# Patient Record
Sex: FEMALE | Race: WHITE | Employment: FULL TIME | URBAN - METROPOLITAN AREA
[De-identification: names, ages, dates, MRNs, and addresses within clinical notes are randomized per-mention and may not be internally consistent; named-entity substitution may affect disease eponyms.]

---

## 2016-12-08 LAB
ANTIBODY SCREEN, EXTERNAL: NORMAL
CHLAMYDIA, EXTERNAL: NORMAL
CYSTIC FIBROSIS, EXTERNAL: NORMAL
HBSAG, EXTERNAL: NORMAL
HCT, EXTERNAL: 38.8
HGB, EXTERNAL: 13.2
HIV, EXTERNAL: NORMAL
N. GONORRHEA, EXTERNAL: NORMAL
PLATELET CNT,   EXTERNAL: 257
RUBELLA, EXTERNAL: NORMAL
T. PALLIDUM, EXTERNAL: NORMAL
TYPE, ABO & RH, EXTERNAL: NORMAL
URINALYSIS, EXTERNAL: NORMAL

## 2017-01-30 LAB — AFP, MATERNAL, EXTERNAL: NORMAL

## 2017-03-02 LAB
NT, EXTERNAL: NORMAL
PAP SMEAR, EXTERNAL: NORMAL

## 2017-03-26 PROBLEM — O09.512 SUPERVISION OF HIGH RISK ELDERLY PRIMIGRAVIDA IN SECOND TRIMESTER: Status: ACTIVE | Noted: 2017-03-26

## 2017-03-30 ENCOUNTER — ROUTINE PRENATAL (OUTPATIENT)
Dept: OBGYN CLINIC | Age: 42
End: 2017-03-30

## 2017-03-30 VITALS — DIASTOLIC BLOOD PRESSURE: 80 MMHG | SYSTOLIC BLOOD PRESSURE: 122 MMHG | WEIGHT: 206 LBS

## 2017-03-30 DIAGNOSIS — Z3A.24 24 WEEKS GESTATION OF PREGNANCY: Primary | ICD-10-CM

## 2017-03-30 RX ORDER — GUAIFENESIN 100 MG/5ML
81 LIQUID (ML) ORAL DAILY
COMMUNITY
End: 2017-07-06

## 2017-03-30 RX ORDER — FOLIC ACID 1 MG/1
TABLET ORAL DAILY
COMMUNITY
End: 2017-07-06

## 2017-04-05 ENCOUNTER — TELEPHONE (OUTPATIENT)
Dept: OBGYN CLINIC | Age: 42
End: 2017-04-05

## 2017-04-05 NOTE — TELEPHONE ENCOUNTER
25w2d called wanting to know if the MD would have the paper work that she left filled out by tomorrow. Please advise.

## 2017-04-18 ENCOUNTER — ROUTINE PRENATAL (OUTPATIENT)
Dept: OBGYN CLINIC | Age: 42
End: 2017-04-18

## 2017-04-18 VITALS — SYSTOLIC BLOOD PRESSURE: 116 MMHG | WEIGHT: 211 LBS | DIASTOLIC BLOOD PRESSURE: 78 MMHG

## 2017-04-18 DIAGNOSIS — Z23 ENCOUNTER FOR IMMUNIZATION: ICD-10-CM

## 2017-04-18 DIAGNOSIS — O30.049 DICHORIONIC DIAMNIOTIC TWIN PREGNANCY, ANTEPARTUM: Primary | ICD-10-CM

## 2017-04-18 NOTE — PATIENT INSTRUCTIONS
Weeks 26 to 30 of Your Pregnancy: Care Instructions  Your Care Instructions    You are now in your last trimester of pregnancy. Your baby is growing rapidly. And you'll probably feel your baby moving around more often. Your doctor may ask you to count your baby's kicks. Your back may ache as your body gets used to your baby's size and length. If you haven't already had the Tdap shot during this pregnancy, talk to your doctor about getting it. It will help protect your  against pertussis infection. During this time, it's important to take care of yourself and pay attention to what your body needs. If you feel sexual, explore ways to be close with your partner that match your comfort and desire. Use the tips provided in this care sheet to find ways to be sexual in your own way. Follow-up care is a key part of your treatment and safety. Be sure to make and go to all appointments, and call your doctor if you are having problems. It's also a good idea to know your test results and keep a list of the medicines you take. How can you care for yourself at home? Take it easy at work  · Take frequent breaks. If possible, stop working when you are tired, and rest during your lunch hour. · Take bathroom breaks every 2 hours. · Change positions often. If you sit for long periods, stand up and walk around. · When you stand for a long time, keep one foot on a low stool with your knee bent. After standing a lot, sit with your feet up. · Avoid fumes, chemicals, and tobacco smoke. Be sexual in your own way  · Having sex during pregnancy is okay, unless your doctor tells you not to. · You may be very interested in sex, or you may have no interest at all. · Your growing belly can make it hard to find a good position during intercourse. [place] and explore. · You may get cramps in your uterus when your partner touches your breasts.   · A back rub may relieve the backache or cramps that sometimes follow orgasm. Learn about  labor  · Watch for signs of  labor. You may be going into labor if:  ¨ You have menstrual-like cramps, with or without nausea. ¨ You have about 4 or more contractions in 20 minutes, or about 8 or more within 1 hour, even after you have had a glass of water and are resting. ¨ You have a low, dull backache that does not go away when you change your position. ¨ You have pain or pressure in your pelvis that comes and goes in a pattern. ¨ You have intestinal cramping or flu-like symptoms, with or without diarrhea. ¨ You notice an increase or change in your vaginal discharge. Discharge may be heavy, mucus-like, watery, or streaked with blood. ¨ Your water breaks. · If you think you have  labor:  ¨ Drink 2 or 3 glasses of water or juice. Not drinking enough fluids can cause contractions. ¨ Stop what you are doing, and empty your bladder. Then lie down on your left side for at least 1 hour. ¨ While lying on your side, find your breast bone. Put your fingers in the soft spot just below it. Move your fingers down toward your belly button to find the top of your uterus. Check to see if it is tight. ¨ Contractions can be weak or strong. Record your contractions for an hour. Time a contraction from the start of one contraction to the start of the next one. ¨ Single or several strong contractions without a pattern are called Patrick-Abrams contractions. They are practice contractions but not the start of labor. They often stop if you change what you are doing. ¨ Call your doctor if you have regular contractions. Where can you learn more? Go to http://laurence-timothy.info/. Enter X945 in the search box to learn more about \"Weeks 26 to 30 of Your Pregnancy: Care Instructions. \"  Current as of: May 30, 2016  Content Version: 11.2  © 1293-2624 [employer].  Care instructions adapted under license by [employer] (which disclaims liability or warranty for this information). If you have questions about a medical condition or this instruction, always ask your healthcare professional. [place] [address] any warranty or liability for your use of this information.

## 2017-04-18 NOTE — PROGRESS NOTES
47 yo G1 at 27w1d with di-di twins (boys) presenting for Sidra Segura 9038 visit. Doing well overall. +FM, no LOF, no VB, no ctx. +GERD, relief with tums. +insomnia. +SI joint pain --> referral to [facility] ([place], [place]) for PT.     Tdap today    28 wk labs next visit    Jesus Varela MD  4/18/2017  8:32 AM

## 2017-04-18 NOTE — MR AVS SNAPSHOT
Visit Information Date & Time Provider Department Dept. Phone Encounter #  
 4/18/2017  8:00 AM Braeden Garcia [phone] [id number] Your Appointments 5/2/2017  3:00 PM  
ESTABLISHED PATIENT with MD Braeden Green  ([facility]) Appt Note: duplicate-fob w/ glucola; pt needed diff date [address] [facility] [address]  
[address]  
  
    
 5/2/2017  3:00 PM  
OB VISIT with MD Braeden Glez  ([facility]) Appt Note: pt needed diff date [address] [place] [facility] [address]  
[address] [place] [address] [address] Upcoming Health Maintenance Date Due  
 PAP AKA CERVICAL CYTOLOGY 5/29/1998 INFLUENZA AGE 9 TO ADULT 8/1/2016 Allergies as of 4/18/2017  Review Complete On: 4/18/2017 By: Elbert Tristan RN Severity Noted Reaction Type Reactions Amoxicillin High 03/26/2017    Anaphylaxis Anesthetic [Benzocaine-aloe Vera] High 03/26/2017    Anaphylaxis ANESTHESIA Current Immunizations  Never Reviewed No immunizations on file. Not reviewed this visit Vitals BP Weight(growth percentile) OB Status Smoking Status 116/78 211 lb (95.7 kg) Pregnant Never Smoker Your Updated Medication List  
  
   
This list is accurate as of: 4/18/17  8:16 AM.  Always use your most recent med list.  
  
  
  
  
 aspirin 81 mg chewable tablet Take 81 mg by mouth daily. folic acid 1 mg tablet Commonly known as:  Google Take  by mouth daily. prenatal multivit-ca-min-fe-fa Tab Take  by mouth. Patient Instructions Weeks 26 to 30 of Your Pregnancy: Care Instructions Your Care Instructions You are now in your last trimester of pregnancy.  Your baby is growing rapidly. And you'll probably feel your baby moving around more often. Your doctor may ask you to count your baby's kicks. Your back may ache as your body gets used to your baby's size and length. If you haven't already had the Tdap shot during this pregnancy, talk to your doctor about getting it. It will help protect your  against pertussis infection. During this time, it's important to take care of yourself and pay attention to what your body needs. If you feel sexual, explore ways to be close with your partner that match your comfort and desire. Use the tips provided in this care sheet to find ways to be sexual in your own way. Follow-up care is a key part of your treatment and safety. Be sure to make and go to all appointments, and call your doctor if you are having problems. It's also a good idea to know your test results and keep a list of the medicines you take. How can you care for yourself at home? Take it easy at work · Take frequent breaks. If possible, stop working when you are tired, and rest during your lunch hour. · Take bathroom breaks every 2 hours. · Change positions often. If you sit for long periods, stand up and walk around. · When you stand for a long time, keep one foot on a low stool with your knee bent. After standing a lot, sit with your feet up. · Avoid fumes, chemicals, and tobacco smoke. Be sexual in your own way · Having sex during pregnancy is okay, unless your doctor tells you not to. · You may be very interested in sex, or you may have no interest at all. · Your growing belly can make it hard to find a good position during intercourse. [place] and explore. · You may get cramps in your uterus when your partner touches your breasts. · A back rub may relieve the backache or cramps that sometimes follow orgasm. Learn about  labor · Watch for signs of  labor. You may be going into labor if: ¨ You have menstrual-like cramps, with or without nausea. ¨ You have about 4 or more contractions in 20 minutes, or about 8 or more within 1 hour, even after you have had a glass of water and are resting. ¨ You have a low, dull backache that does not go away when you change your position. ¨ You have pain or pressure in your pelvis that comes and goes in a pattern. ¨ You have intestinal cramping or flu-like symptoms, with or without diarrhea. ¨ You notice an increase or change in your vaginal discharge. Discharge may be heavy, mucus-like, watery, or streaked with blood. ¨ Your water breaks. · If you think you have  labor: ¨ Drink 2 or 3 glasses of water or juice. Not drinking enough fluids can cause contractions. ¨ Stop what you are doing, and empty your bladder. Then lie down on your left side for at least 1 hour. ¨ While lying on your side, find your breast bone. Put your fingers in the soft spot just below it. Move your fingers down toward your belly button to find the top of your uterus. Check to see if it is tight. ¨ Contractions can be weak or strong. Record your contractions for an hour. Time a contraction from the start of one contraction to the start of the next one. ¨ Single or several strong contractions without a pattern are called Oceana-Abrams contractions. They are practice contractions but not the start of labor. They often stop if you change what you are doing. ¨ Call your doctor if you have regular contractions. Where can you learn more? Go to http://laurence-timothy.info/. Enter R929 in the search box to learn more about \"Weeks 26 to 30 of Your Pregnancy: Care Instructions. \" Current as of: May 30, 2016 Content Version: 11.2 © 4380-9154 [employer]. Care instructions adapted under license by [employer] (which disclaims liability or warranty for this information).  If you have questions about a medical condition or this instruction, always ask your healthcare professional. [place] [address] any warranty or liability for your use of this information. Introducing [facility] & HEALTH SERVICES! [employer] introduces [employer] patient portal. Now you can access parts of your medical record, email your doctor's office, and request medication refills online. 1. In your internet browser, go to [email or web address] 2. Click on the First Time User? Click Here link in the Sign In box. You will see the New Member Sign Up page. 3. Enter your [employer] Access Code exactly as it appears below. You will not need to use this code after youve completed the sign-up process. If you do not sign up before the expiration date, you must request a new code. · [employer] Access Code: [email or web address] Expires: 7/17/2017  8:16 AM 
 
4. Enter the last four digits of your Social Security Number (xxxx) and Date of Birth (mm/dd/yyyy) as indicated and click Submit. You will be taken to the next sign-up page. 5. Create a [employer] ID. This will be your [employer] login ID and cannot be changed, so think of one that is secure and easy to remember. 6. Create a [employer] password. You can change your password at any time. 7. Enter your Password Reset Question and Answer. This can be used at a later time if you forget your password. 8. Enter your e-mail address. You will receive e-mail notification when new information is available in [address] 9. Click Sign Up. You can now view and download portions of your medical record. 10. Click the Download Summary menu link to download a portable copy of your medical information. If you have questions, please visit the Frequently Asked Questions section of the [employer] website. Remember, [employer] is NOT to be used for urgent needs. For medical emergencies, dial 911. Now available from your iPhone and Android! Please provide this summary of care documentation to your next provider. If you have any questions after today's visit, please call [phone].

## 2017-05-02 ENCOUNTER — ROUTINE PRENATAL (OUTPATIENT)
Dept: OBGYN CLINIC | Age: 42
End: 2017-05-02

## 2017-05-02 VITALS — WEIGHT: 213 LBS | SYSTOLIC BLOOD PRESSURE: 122 MMHG | DIASTOLIC BLOOD PRESSURE: 76 MMHG

## 2017-05-02 DIAGNOSIS — O30.043 DICHORIONIC DIAMNIOTIC TWIN PREGNANCY IN THIRD TRIMESTER: Primary | ICD-10-CM

## 2017-05-02 LAB — GTT, 1 HR, GLUCOLA, EXTERNAL: 108

## 2017-05-02 NOTE — PROGRESS NOTES
Glucola today.    Constipation; discussed management; US last Monday- twins doing great; thick cx; next US 5/22; fu here 2wk

## 2017-05-02 NOTE — MR AVS SNAPSHOT
Visit Information Date & Time Provider Department Dept. Phone Encounter #  
 8/2/4070  6:55 PM Jerrica Sheehan  [facility] [address] [facility] [id number] Your Appointments 5/15/2017  8:40 AM  
OB VISIT with Jessica Clemens MD  
[address] ([facility]) Appt Note: duplicate-fob  
 [address] [address]  
[address] 5/15/2017  8:40 AM  
OB VISIT with Jerrica Sheehan MD  
[address] ([facility]) Appt Note: duplicate-fob  
 [address] [address]  
[address] [place] [address]  
  
    
 5/30/2017  7:50 AM  
OB VISIT with Jerrica Sheehan MD  
[address] ([facility]) Appt Note: fob  
 [address]  
[phone] 6/12/2017  7:50 AM  
OB VISIT with Jerrica Sheehan MD  
[address] ([facility]) Appt Note: fob  
 [address]  
[phone] 6/19/2017  8:10 AM  
OB VISIT with Jerrica Sheehan MD  
[address] ([facility]) Appt Note: fob  
 [address]  
[phone] 6/26/2017  7:40 AM  
OB VISIT with Jerrica Sheehan MD  
[address] ([facility]) Appt Note: fob  
 [address]  
[phone] 7/3/2017  7:50 AM  
OB VISIT with Jerrica Sheehan MD  
[address] ([facility]) Appt Note: fob  
 [address]  
[phone] 7/10/2017  8:20 AM  
OB VISIT with Jessica Clemens MD  
[address] ([facility]) Appt Note: alfred/dulce pt  
 [address] [facility] [address] [address] [phone] Upcoming Health Maintenance Date Due  
 PAP AKA CERVICAL CYTOLOGY 5/29/1998 INFLUENZA AGE 9 TO ADULT 8/1/2017 Allergies as of 5/2/2017  Review Complete On: 5/2/2017 By: Gloria Suh Severity Noted Reaction Type Reactions Amoxicillin High 03/26/2017    Anaphylaxis Anesthetic [Benzocaine-aloe Vera] High 03/26/2017    Anaphylaxis ANESTHESIA Current Immunizations  Never Reviewed Name Date Tdap 4/18/2017 Not reviewed this visit You Were Diagnosed With   
  
 Codes Comments Dichorionic diamniotic twin pregnancy in third trimester    -  Primary ICD-10-CM: O30.043 ICD-9-CM: 651.03, V91.03 Vitals BP Weight(growth percentile) OB Status Smoking Status 122/76 213 lb (96.6 kg) Pregnant Never Smoker Your Updated Medication List  
  
   
This list is accurate as of: 5/2/17  1:57 PM.  Always use your most recent med list.  
  
  
  
  
 aspirin 81 mg chewable tablet Take 81 mg by mouth daily. folic acid 1 mg tablet Commonly known as:  Google Take  by mouth daily. prenatal multivit-ca-min-fe-fa Tab Take  by mouth. Patient Instructions Learning About Twin Pregnancy What is different about a twin pregnancy? In many ways, a twin pregnancy is like a single-baby pregnancy. Healthy twins develop like a single baby does until the last trimester, when their growth slows down. Twins are usually born before the usual 40-week due date. For the mother, carrying twins can be more difficult than carrying a single baby. And her risks are higher for pregnancy problems. That's why keeping up with prenatal checks and tests is especially important. How do twins grow? Twins develop from embryos to babies like a single baby does. · By weeks 10 to 14 of your pregnancy, one or two placentas have formed inside your uterus.  It is possible to hear your babies' heartbeats with a special ultrasound device. Your babies' eyes can move. Their arms and legs can bend. · Around weeks 14 to 18, hair is beginning to grow on your babies' heads. · By 18 to 22 weeks, your babies can now suck their thumbs and  firmly with their hands. They can open and close their eyelids. Around this time, you may start to feel your babies move. At first, this might feel like fluttering or \"butterflies. \" 
· Around week 26, you may notice that your babies respond to the sound of your or your partner's voice. You may also notice that they do less turning and twisting and more squirming. · Up to 32 weeks, twins grow rapidly. By this point, they really start to look like babies, with hair and plump skin. · Around 32 to 34 weeks, twins' pace of growth slows down. Their lungs become more ready for breathing. This marks a stage when babies born early are less likely to have breathing problems after birth. What can you expect during a twin pregnancy? With a twin pregnancy, your body makes high levels of pregnancy hormones. So morning sickness may come on earlier and stronger than if you were carrying a single baby. You may also have earlier and more intense symptoms from pregnancy, like swelling, heartburn, leg cramps, bladder discomfort, and sleep problems. Your belly may grow bigger, and you may gain more weight, sooner. Talk to your doctor about how much you might expect to gain. When you're carrying twins, you and your babies may be tested and checked more than you would for a single-baby pregnancy. · At about 10 weeks, an ultrasound can show if the babies are growing in the same amniotic sac. If they each have their own sac and their own placenta, twins have the best chances of both growing well. · Between weeks 18 and 20, an ultrasound may be able to show the sex of your babies. · Later in your pregnancy, you will start to have checkups more often. This will be sooner than for a single-baby pregnancy. Twins tend to be born early, but 37 weeks is a goal when mother and babies are doing well. As you get closer to delivery time, your medical team will help you know what to expect and what to do. As questions come to mind, keep a list so you can remember to ask your doctor. Follow-up care is a key part of your treatment and safety. Be sure to make and go to all appointments, and call your doctor if you are having problems. It's also a good idea to know your test results and keep a list of the medicines you take. Where can you learn more? Go to http://laurence-timothy.info/. Enter E672 in the search box to learn more about \"Learning About Twin Pregnancy. \" Current as of: May 30, 2016 Content Version: 11.2 © 4272-8753 [employer]. Care instructions adapted under license by [employer] (which disclaims liability or warranty for this information). If you have questions about a medical condition or this instruction, always ask your healthcare professional. [place] [address] any warranty or liability for your use of this information. Introducing [facility] & HEALTH SERVICES! [facility] introduces L[employer] patient portal. Now you can access parts of your medical record, email your doctor's office, and request medication refills online. 1. In your internet browser, go to [email or web address] 2. Click on the First Time User? Click Here link in the Sign In box. You will see the New Member Sign Up page. 3. Enter your Litesprite Access Code exactly as it appears below. You will not need to use this code after youve completed the sign-up process. If you do not sign up before the expiration date, you must request a new code. · Lites[employer]ite Access Code: [email or web address] Expires: 7/17/2017  8:16 AM 
 
4. Enter the last four digits of your Social Security Number (xxxx) and Date of Birth (mm/dd/yyyy) as indicated and click Submit.  You will be taken to the next sign-up page. 5. Create a [employer] ID. This will be your [employer] login ID and cannot be changed, so think of one that is secure and easy to remember. 6. Create a [employer] password. You can change your password at any time. 7. Enter your Password Reset Question and Answer. This can be used at a later time if you forget your password. 8. Enter your e-mail address. You will receive e-mail notification when new information is available in [address] 9. Click Sign Up. You can now view and download portions of your medical record. 10. Click the Download Summary menu link to download a portable copy of your medical information. If you have questions, please visit the Frequently Asked Questions section of the [employer] website. Remember, [employer] is NOT to be used for urgent needs. For medical emergencies, dial 911. Now available from your iPhone and Android! Please provide this summary of care documentation to your next provider. If you have any questions after today's visit, please call [phone].

## 2017-05-02 NOTE — PATIENT INSTRUCTIONS
Learning About Twin Pregnancy  What is different about a twin pregnancy? In many ways, a twin pregnancy is like a single-baby pregnancy. Healthy twins develop like a single baby does until the last trimester, when their growth slows down. Twins are usually born before the usual 40-week due date. For the mother, carrying twins can be more difficult than carrying a single baby. And her risks are higher for pregnancy problems. That's why keeping up with prenatal checks and tests is especially important. How do twins grow? Twins develop from embryos to babies like a single baby does. · By weeks 10 to 14 of your pregnancy, one or two placentas have formed inside your uterus. It is possible to hear your babies' heartbeats with a special ultrasound device. Your babies' eyes can move. Their arms and legs can bend. · Around weeks 14 to 18, hair is beginning to grow on your babies' heads. · By 18 to 22 weeks, your babies can now suck their thumbs and  firmly with their hands. They can open and close their eyelids. Around this time, you may start to feel your babies move. At first, this might feel like fluttering or \"butterflies. \"  · Around week 26, you may notice that your babies respond to the sound of your or your partner's voice. You may also notice that they do less turning and twisting and more squirming. · Up to 32 weeks, twins grow rapidly. By this point, they really start to look like babies, with hair and plump skin. · Around 32 to 34 weeks, twins' pace of growth slows down. Their lungs become more ready for breathing. This marks a stage when babies born early are less likely to have breathing problems after birth. What can you expect during a twin pregnancy? With a twin pregnancy, your body makes high levels of pregnancy hormones. So morning sickness may come on earlier and stronger than if you were carrying a single baby.  You may also have earlier and more intense symptoms from pregnancy, like swelling, heartburn, leg cramps, bladder discomfort, and sleep problems. Your belly may grow bigger, and you may gain more weight, sooner. Talk to your doctor about how much you might expect to gain. When you're carrying twins, you and your babies may be tested and checked more than you would for a single-baby pregnancy. · At about 10 weeks, an ultrasound can show if the babies are growing in the same amniotic sac. If they each have their own sac and their own placenta, twins have the best chances of both growing well. · Between weeks 18 and 20, an ultrasound may be able to show the sex of your babies. · Later in your pregnancy, you will start to have checkups more often. This will be sooner than for a single-baby pregnancy. Twins tend to be born early, but 37 weeks is a goal when mother and babies are doing well. As you get closer to delivery time, your medical team will help you know what to expect and what to do. As questions come to mind, keep a list so you can remember to ask your doctor. Follow-up care is a key part of your treatment and safety. Be sure to make and go to all appointments, and call your doctor if you are having problems. It's also a good idea to know your test results and keep a list of the medicines you take. Where can you learn more? Go to http://laurence-timothy.info/. Enter X093 in the search box to learn more about \"Learning About Twin Pregnancy. \"  Current as of: May 30, 2016  Content Version: 11.2  © 1318-2094 [employer]. Care instructions adapted under license by [employer] (which disclaims liability or warranty for this information). If you have questions about a medical condition or this instruction, always ask your healthcare professional. [address] any warranty or liability for your use of this information.

## 2017-05-03 LAB
ERYTHROCYTE [DISTWIDTH] IN BLOOD BY AUTOMATED COUNT: 12.6 % (ref 12.3–15.4)
GLUCOSE 1H P 50 G GLC PO SERPL-MCNC: 108 MG/DL (ref 65–139)
HCT VFR BLD AUTO: 33.8 % (ref 34–46.6)
HGB BLD-MCNC: 10.9 G/DL (ref 11.1–15.9)
MCH RBC QN AUTO: 29.6 PG (ref 26.6–33)
MCHC RBC AUTO-ENTMCNC: 32.2 G/DL (ref 31.5–35.7)
MCV RBC AUTO: 92 FL (ref 79–97)
PLATELET # BLD AUTO: 271 X10E3/UL (ref 150–379)
RBC # BLD AUTO: 3.68 X10E6/UL (ref 3.77–5.28)
WBC # BLD AUTO: 12 X10E3/UL (ref 3.4–10.8)

## 2017-05-10 ENCOUNTER — TELEPHONE (OUTPATIENT)
Dept: OBGYN CLINIC | Age: 42
End: 2017-05-10

## 2017-05-10 NOTE — TELEPHONE ENCOUNTER
30w2d called stated that she is pregnant with twins. Stated Baby B is not moving as much as Baby A. Denied any bleeding or cramping. Spoke to Troy and the call was transferred to her.

## 2017-05-11 ENCOUNTER — ROUTINE PRENATAL (OUTPATIENT)
Dept: OBGYN CLINIC | Age: 42
End: 2017-05-11

## 2017-05-11 VITALS — WEIGHT: 215 LBS | DIASTOLIC BLOOD PRESSURE: 66 MMHG | SYSTOLIC BLOOD PRESSURE: 108 MMHG

## 2017-05-11 DIAGNOSIS — O30.049 DICHORIONIC DIAMNIOTIC TWIN PREGNANCY, ANTEPARTUM: Primary | ICD-10-CM

## 2017-05-11 NOTE — PROGRESS NOTES
US- vtx/breech; nl growth/ MILTON/ BPP x2; overall doing great; had been concerned about decreased FM of twin B but reassured leandro as anterior placenta  Has fu w Dr Suki Bobby next week

## 2017-05-18 ENCOUNTER — TELEPHONE (OUTPATIENT)
Dept: OBGYN CLINIC | Age: 42
End: 2017-05-18

## 2017-05-18 NOTE — TELEPHONE ENCOUNTER
Patient returned call and was transferred to [facility] for assistance per her documentation.

## 2017-05-18 NOTE — TELEPHONE ENCOUNTER
Patient called demanding either Leigh Ann Browne or Alma Cavanaugh to write her a RX for PT and leave it at the  and she will pick it up. Please advise.

## 2017-05-18 NOTE — TELEPHONE ENCOUNTER
Left message with patient to let us know what she needs PT for, not sure if it is back pain, sciatica, etc. I can then leave it for her to

## 2017-05-19 NOTE — TELEPHONE ENCOUNTER
Patient called requesting the order to be faxed so she does not have to get out of bed. The fax# [phone] Attn : Chris Rees Please advise.

## 2017-05-19 NOTE — TELEPHONE ENCOUNTER
Jazmyn Bajwa from [facility] calling stating patient is at the PT facility now and does not have an order for PT and cannot have PT until the order is faxed to [phone].

## 2017-05-26 ENCOUNTER — ROUTINE PRENATAL (OUTPATIENT)
Dept: OBGYN CLINIC | Age: 42
End: 2017-05-26

## 2017-05-26 VITALS — WEIGHT: 217 LBS | SYSTOLIC BLOOD PRESSURE: 122 MMHG | DIASTOLIC BLOOD PRESSURE: 78 MMHG

## 2017-05-26 DIAGNOSIS — O30.043 DICHORIONIC DIAMNIOTIC TWIN PREGNANCY IN THIRD TRIMESTER: Primary | ICD-10-CM

## 2017-05-26 NOTE — PATIENT INSTRUCTIONS
Learning About Twin Pregnancy  What is different about a twin pregnancy? In many ways, a twin pregnancy is like a single-baby pregnancy. Healthy twins develop like a single baby does until the last trimester, when their growth slows down. Twins are usually born before the usual 40-week due date. For the mother, carrying twins can be more difficult than carrying a single baby. And her risks are higher for pregnancy problems. That's why keeping up with prenatal checks and tests is especially important. How do twins grow? Twins develop from embryos to babies like a single baby does. · By weeks 10 to 14 of your pregnancy, one or two placentas have formed inside your uterus. It is possible to hear your babies' heartbeats with a special ultrasound device. Your babies' eyes can move. Their arms and legs can bend. · Around weeks 14 to 18, hair is beginning to grow on your babies' heads. · By 18 to 22 weeks, your babies can now suck their thumbs and  firmly with their hands. They can open and close their eyelids. Around this time, you may start to feel your babies move. At first, this might feel like fluttering or \"butterflies. \"  · Around week 26, you may notice that your babies respond to the sound of your or your partner's voice. You may also notice that they do less turning and twisting and more squirming. · Up to 32 weeks, twins grow rapidly. By this point, they really start to look like babies, with hair and plump skin. · Around 32 to 34 weeks, twins' pace of growth slows down. Their lungs become more ready for breathing. This marks a stage when babies born early are less likely to have breathing problems after birth. What can you expect during a twin pregnancy? With a twin pregnancy, your body makes high levels of pregnancy hormones. So morning sickness may come on earlier and stronger than if you were carrying a single baby.  You may also have earlier and more intense symptoms from pregnancy, like swelling, heartburn, leg cramps, bladder discomfort, and sleep problems. Your belly may grow bigger, and you may gain more weight, sooner. Talk to your doctor about how much you might expect to gain. When you're carrying twins, you and your babies may be tested and checked more than you would for a single-baby pregnancy. · At about 10 weeks, an ultrasound can show if the babies are growing in the same amniotic sac. If they each have their own sac and their own placenta, twins have the best chances of both growing well. · Between weeks 18 and 20, an ultrasound may be able to show the sex of your babies. · Later in your pregnancy, you will start to have checkups more often. This will be sooner than for a single-baby pregnancy. Twins tend to be born early, but 37 weeks is a goal when mother and babies are doing well. As you get closer to delivery time, your medical team will help you know what to expect and what to do. As questions come to mind, keep a list so you can remember to ask your doctor. Follow-up care is a key part of your treatment and safety. Be sure to make and go to all appointments, and call your doctor if you are having problems. It's also a good idea to know your test results and keep a list of the medicines you take. Where can you learn more? Go to http://laurence-timothy.info/. Enter X543 in the search box to learn more about \"Learning About Twin Pregnancy. \"  Current as of: May 30, 2016  Content Version: 11.2  © 0492-9705 [employer]. Care instructions adapted under license by [employer] (which disclaims liability or warranty for this information). If you have questions about a medical condition or this instruction, always ask your healthcare professional. [address] any warranty or liability for your use of this information.

## 2017-05-26 NOTE — PROGRESS NOTES
Patient had US with Lula Carranza Monday Baby A 96% 8/8 BPP; vertex  Baby B 95% 8/8 BPP; transverse  Hanging in there; cx firm/closed  Good friends w the [facility]; just delivered twin girls

## 2017-06-02 ENCOUNTER — ROUTINE PRENATAL (OUTPATIENT)
Dept: OBGYN CLINIC | Age: 42
End: 2017-06-02

## 2017-06-02 VITALS — DIASTOLIC BLOOD PRESSURE: 78 MMHG | WEIGHT: 220 LBS | SYSTOLIC BLOOD PRESSURE: 124 MMHG

## 2017-06-02 DIAGNOSIS — O30.003 TWIN GESTATION IN THIRD TRIMESTER, UNSPECIFIED MULTIPLE GESTATION TYPE: Primary | ICD-10-CM

## 2017-06-02 NOTE — PROGRESS NOTES
Doing well.   US- vtx/breech; reassuring surveillance on both; HAPPY TO HAVE PLTCS; precautions discussed; pt still working and swimming

## 2017-06-02 NOTE — PATIENT INSTRUCTIONS

## 2017-06-12 ENCOUNTER — ROUTINE PRENATAL (OUTPATIENT)
Dept: OBGYN CLINIC | Age: 42
End: 2017-06-12

## 2017-06-12 VITALS — SYSTOLIC BLOOD PRESSURE: 120 MMHG | WEIGHT: 221 LBS | DIASTOLIC BLOOD PRESSURE: 70 MMHG

## 2017-06-12 DIAGNOSIS — Z34.03 ENCOUNTER FOR SUPERVISION OF NORMAL FIRST PREGNANCY IN THIRD TRIMESTER: Primary | ICD-10-CM

## 2017-06-12 NOTE — PROGRESS NOTES
Ultrasound with Dr. Perla Pina last Friday reassuring; still working and doesn't want to stop; getting very uncomfortable

## 2017-06-19 ENCOUNTER — ROUTINE PRENATAL (OUTPATIENT)
Dept: OBGYN CLINIC | Age: 42
End: 2017-06-19

## 2017-06-19 VITALS — DIASTOLIC BLOOD PRESSURE: 82 MMHG | SYSTOLIC BLOOD PRESSURE: 120 MMHG | WEIGHT: 221 LBS

## 2017-06-19 DIAGNOSIS — Z34.03 ENCOUNTER FOR SUPERVISION OF NORMAL FIRST PREGNANCY IN THIRD TRIMESTER: Primary | ICD-10-CM

## 2017-06-19 NOTE — PROGRESS NOTES
ultrasound with Dr. Ramesh Castro last Thursday reassuring; vtx/ transverse; Ramesh Castro said she could have C section scheduled 37-38 week; cx closed  Trying to post PLTCS for 7/3 at noon; GBS

## 2017-06-22 LAB — GRBS, EXTERNAL: NEGATIVE

## 2017-06-23 LAB — B-HEM STREP SPEC QL CULT: NEGATIVE

## 2017-06-27 ENCOUNTER — ROUTINE PRENATAL (OUTPATIENT)
Dept: OBGYN CLINIC | Age: 42
End: 2017-06-27

## 2017-06-27 VITALS — DIASTOLIC BLOOD PRESSURE: 86 MMHG | WEIGHT: 224 LBS | SYSTOLIC BLOOD PRESSURE: 128 MMHG

## 2017-06-27 DIAGNOSIS — Z34.03 ENCOUNTER FOR SUPERVISION OF NORMAL FIRST PREGNANCY IN THIRD TRIMESTER: Primary | ICD-10-CM

## 2017-06-30 ENCOUNTER — HOSPITAL ENCOUNTER (OUTPATIENT)
Dept: OTHER | Age: 42
Discharge: HOME | End: 2017-06-30
Payer: COMMERCIAL

## 2017-06-30 VITALS — HEIGHT: 72 IN

## 2017-06-30 DIAGNOSIS — O09.512 SUPERVISION OF HIGH RISK ELDERLY PRIMIGRAVIDA IN SECOND TRIMESTER: ICD-10-CM

## 2017-06-30 LAB
ERYTHROCYTE [DISTWIDTH] IN BLOOD BY AUTOMATED COUNT: 13.4 % (ref 11.5–14.5)
HCT VFR BLD AUTO: 32.5 % (ref 35–47)
HGB BLD-MCNC: 10.5 G/DL (ref 11.5–16)
MCH RBC QN AUTO: 26.5 PG (ref 26–34)
MCHC RBC AUTO-ENTMCNC: 32.3 G/DL (ref 30–36.5)
MCV RBC AUTO: 82.1 FL (ref 80–99)
PLATELET # BLD AUTO: 198 K/UL (ref 150–400)
RBC # BLD AUTO: 3.96 M/UL (ref 3.8–5.2)
WBC # BLD AUTO: 8.2 K/UL (ref 3.6–11)

## 2017-06-30 PROCEDURE — 85027 COMPLETE CBC AUTOMATED: CPT | Performed by: OBSTETRICS & GYNECOLOGY

## 2017-06-30 PROCEDURE — 86900 BLOOD TYPING SEROLOGIC ABO: CPT | Performed by: OBSTETRICS & GYNECOLOGY

## 2017-06-30 PROCEDURE — 36415 COLL VENOUS BLD VENIPUNCTURE: CPT | Performed by: OBSTETRICS & GYNECOLOGY

## 2017-06-30 NOTE — PROGRESS NOTES
Patient here for Pre-Admission Testing (PAT) for scheduled  section. PAT packet reviewed with the patient. Labs drawn and sent. KONSTANTIN wipes and preventing surgical site infection education given to the patient. Education also provided to be NPO after 0400 and to arrive for scheduled procedure at 1000 on 7/3/17. Patient verbalizes understanding and sent home with PAT packet for further review. Patient instructed to take no medication(s) on the morning of her scheduled procedure.

## 2017-07-02 ENCOUNTER — ANESTHESIA EVENT (OUTPATIENT)
Dept: LABOR AND DELIVERY | Age: 42
End: 2017-07-02
Payer: COMMERCIAL

## 2017-07-02 NOTE — H&P
History & Physical    Name: Antonia Davis MRN: [id number]  SSN: [id number]    YOB: 1975  Age: 38 y.o. Sex: female      Subjective:     Estimated Date of Delivery: 17  OB History    Para Term  AB Living   1        SAB TAB Ectopic Molar Multiple Live Births              # Outcome Date GA Lbr William/2nd Weight Sex Delivery Anes PTL Lv   1 Current                   Ms. Hector Edwards is admitted with pregnancy at 44w3d for PLTCS due to di/di twins and advanced maternal age. Prenatal course was complicated by advanced maternal age and twins. Please see prenatal records for details. Problem list:  Twins di/di--IUI Dr. Nadine Osler insemination on 10/18/16; Shahid Coburn- vtx/transv Dr Judit Elizondo  Flu vaccine received elsewhere  BOYS x 2  SI joint pain   GERD  Insomnia  tdap 17  Physical therapist    Past Medical History:   Diagnosis Date    Anemia     Basal cell carcinoma 3524    Complication of anesthesia     decreased HR    Infertility, female     6 rounds of IUI - not for infertility, but no partner     Past Surgical History:   Procedure Laterality Date    HX KNEE ARTHROSCOPY Right 2000    HX KNEE ARTHROSCOPY Right 2000    HX POLYPECTOMY  2016    uterine    HX SKIN BIOPSY      Basal cell carcinoma removed from face, nose, L and R arm and abdomen    HX WISDOM TEETH EXTRACTION       Social History     Occupational History    Not on file.      Social History Main Topics    Smoking status: Never Smoker    Smokeless tobacco: Never Used    Alcohol use No    Drug use: No    Sexual activity: Yes     Partners: Male     Birth control/ protection: None     Family History   Problem Relation Age of Onset    Cancer Mother      soft tissue sarcoma    Diabetes Mother     Hypertension Mother     Cancer Father      prostate & skin    Bleeding Prob Brother      blood clot after surgery       Allergies   Allergen Reactions    Amoxicillin Other (comments)     Migraines    Anesthetic [Benzocaine-Aloe Vera] Other (comments)     ANESTHESIA results in decreased heart rate (in the 30s)  Has happened twice     Prior to Admission medications    Medication Sig Start Date End Date Taking? Authorizing Provider   folic acid (FOLVITE) 1 mg tablet Take  by mouth daily. Historical Provider   aspirin 81 mg chewable tablet Take 81 mg by mouth daily. Historical Provider   prenatal multivit-ca-min-fe-fa tab Take  by mouth. Historical Provider        Review of Systems: A comprehensive review of systems was negative except for that written in the History of Present Illness. Objective:     Vitals: There were no vitals filed for this visit. Physical Exam:  Patient without distress. Lung: clear to auscultation throughout lung fields, no wheezes, no rales, no rhonchi and normal respiratory effort  Abdomen: soft, nontender  Cervical Exam: 1 cm dilated    50% effaced    -2 station    Lower Extremities:  - Edema 1+  Membranes:  Intact  Fetal Heart Rate: Reactive  Accelerations: yes          Prenatal Labs:   Lab Results   Component Value Date/Time    ABO/Rh(D) O POSITIVE 06/30/2017 09:45 AM    Rubella, External immune 12/08/2016    HBsAg, External neg 12/08/2016    HIV, External neg 12/08/2016    Gonorrhea, External neg 12/08/2016    Chlamydia, External neg 12/08/2016    ABO,Rh O positive 12/08/2016    GrBStrep, External negative 06/22/2017       Impression/Plan:     Active Problems:    * No active hospital problems. *  Twins- Di/di with reassuring fetal surveillance; twin A vertex; B oblique     Plan: Admit for PLTCS. Options for timing and route of delivery reviewed with SEBAS Shore and couple. All are comfortable with plan to proceed with delivery by PLTCS at this time. Group B Strep negative.     Signed By:  Roxanne Hughes MD     July 2, 2017

## 2017-07-03 ENCOUNTER — HOSPITAL ENCOUNTER (INPATIENT)
Age: 42
LOS: 3 days | Discharge: HOME | End: 2017-07-06
Attending: OBSTETRICS & GYNECOLOGY | Admitting: OBSTETRICS & GYNECOLOGY
Payer: COMMERCIAL

## 2017-07-03 ENCOUNTER — ANESTHESIA (OUTPATIENT)
Dept: LABOR AND DELIVERY | Age: 42
End: 2017-07-03
Payer: COMMERCIAL

## 2017-07-03 LAB
ABO + RH BLD: NORMAL
BLOOD GROUP ANTIBODIES SERPL: NORMAL
SPECIMEN EXP DATE BLD: NORMAL

## 2017-07-03 PROCEDURE — 74011250636 HC RX REV CODE- 250/636: Performed by: OBSTETRICS & GYNECOLOGY

## 2017-07-03 PROCEDURE — 77030032490 HC SLV COMPR SCD KNE COVD -B

## 2017-07-03 PROCEDURE — 74011250636 HC RX REV CODE- 250/636

## 2017-07-03 PROCEDURE — 77030002888 HC SUT CHRMC J&J -A

## 2017-07-03 PROCEDURE — 75410000003 HC RECOV DEL/VAG/CSECN EA 0.5 HR: Performed by: OBSTETRICS & GYNECOLOGY

## 2017-07-03 PROCEDURE — 77030007866 HC KT SPN ANES BBMI -B: Performed by: ANESTHESIOLOGY

## 2017-07-03 PROCEDURE — 77030018846 HC SOL IRR STRL H20 ICUM -A

## 2017-07-03 PROCEDURE — 77030014144 HC TY SPN ANES BBMI -B

## 2017-07-03 PROCEDURE — 77030011640 HC PAD GRND REM COVD -A

## 2017-07-03 PROCEDURE — 36415 COLL VENOUS BLD VENIPUNCTURE: CPT | Performed by: OBSTETRICS & GYNECOLOGY

## 2017-07-03 PROCEDURE — 77030018836 HC SOL IRR NACL ICUM -A

## 2017-07-03 PROCEDURE — 77030010507 HC ADH SKN DERMBND J&J -B

## 2017-07-03 PROCEDURE — 65410000002 HC RM PRIVATE OB

## 2017-07-03 PROCEDURE — 76010000397 HC C SECN MULTIPL FIRST 1 HR: Performed by: OBSTETRICS & GYNECOLOGY

## 2017-07-03 PROCEDURE — 74011250636 HC RX REV CODE- 250/636: Performed by: ANESTHESIOLOGY

## 2017-07-03 PROCEDURE — 74011000250 HC RX REV CODE- 250

## 2017-07-03 PROCEDURE — 76060000078 HC EPIDURAL ANESTHESIA: Performed by: OBSTETRICS & GYNECOLOGY

## 2017-07-03 PROCEDURE — 77030002933 HC SUT MCRYL J&J -A

## 2017-07-03 RX ORDER — AMMONIA 15 % (W/V)
1 AMPUL (EA) INHALATION AS NEEDED
Status: DISCONTINUED | OUTPATIENT
Start: 2017-07-03 | End: 2017-07-06 | Stop reason: HOSPADM

## 2017-07-03 RX ORDER — OXYTOCIN/RINGER'S LACTATE 20/1000 ML
PLASTIC BAG, INJECTION (ML) INTRAVENOUS
Status: DISCONTINUED | OUTPATIENT
Start: 2017-07-03 | End: 2017-07-03 | Stop reason: HOSPADM

## 2017-07-03 RX ORDER — HYDROCODONE BITARTRATE AND ACETAMINOPHEN 7.5; 325 MG/1; MG/1
1 TABLET ORAL
Status: DISCONTINUED | OUTPATIENT
Start: 2017-07-03 | End: 2017-07-06 | Stop reason: HOSPADM

## 2017-07-03 RX ORDER — OXYTOCIN/RINGER'S LACTATE 20/1000 ML
125-1000 PLASTIC BAG, INJECTION (ML) INTRAVENOUS
Status: DISCONTINUED | OUTPATIENT
Start: 2017-07-03 | End: 2017-07-03 | Stop reason: HOSPADM

## 2017-07-03 RX ORDER — PHENYLEPHRINE 10 MG/250 ML(40 MCG/ML)IN 0.9 % SOD.CHLORIDE INTRAVENOUS
Status: DISPENSED
Start: 2017-07-03 | End: 2017-07-03

## 2017-07-03 RX ORDER — NALBUPHINE HYDROCHLORIDE 10 MG/ML
2.5 INJECTION, SOLUTION INTRAMUSCULAR; INTRAVENOUS; SUBCUTANEOUS
Status: ACTIVE | OUTPATIENT
Start: 2017-07-03 | End: 2017-07-04

## 2017-07-03 RX ORDER — OXYTOCIN/RINGER'S LACTATE 20/1000 ML
125-1000 PLASTIC BAG, INJECTION (ML) INTRAVENOUS AS NEEDED
Status: DISCONTINUED | OUTPATIENT
Start: 2017-07-03 | End: 2017-07-06 | Stop reason: HOSPADM

## 2017-07-03 RX ORDER — CEFAZOLIN SODIUM IN 0.9 % NACL 2 G/50 ML
INTRAVENOUS SOLUTION, PIGGYBACK (ML) INTRAVENOUS
Status: DISPENSED
Start: 2017-07-03 | End: 2017-07-03

## 2017-07-03 RX ORDER — SODIUM CHLORIDE, SODIUM LACTATE, POTASSIUM CHLORIDE, CALCIUM CHLORIDE 600; 310; 30; 20 MG/100ML; MG/100ML; MG/100ML; MG/100ML
125 INJECTION, SOLUTION INTRAVENOUS CONTINUOUS
Status: DISCONTINUED | OUTPATIENT
Start: 2017-07-03 | End: 2017-07-06 | Stop reason: HOSPADM

## 2017-07-03 RX ORDER — HYDROCODONE BITARTRATE AND ACETAMINOPHEN 5; 325 MG/1; MG/1
1 TABLET ORAL
Status: DISCONTINUED | OUTPATIENT
Start: 2017-07-03 | End: 2017-07-06 | Stop reason: HOSPADM

## 2017-07-03 RX ORDER — SODIUM CHLORIDE, SODIUM LACTATE, POTASSIUM CHLORIDE, CALCIUM CHLORIDE 600; 310; 30; 20 MG/100ML; MG/100ML; MG/100ML; MG/100ML
INJECTION, SOLUTION INTRAVENOUS
Status: DISCONTINUED | OUTPATIENT
Start: 2017-07-03 | End: 2017-07-03 | Stop reason: HOSPADM

## 2017-07-03 RX ORDER — MORPHINE SULFATE 10 MG/ML
10 INJECTION, SOLUTION INTRAMUSCULAR; INTRAVENOUS
Status: ACTIVE | OUTPATIENT
Start: 2017-07-03 | End: 2017-07-04

## 2017-07-03 RX ORDER — SODIUM CHLORIDE 0.9 % (FLUSH) 0.9 %
5-10 SYRINGE (ML) INJECTION EVERY 8 HOURS
Status: DISCONTINUED | OUTPATIENT
Start: 2017-07-03 | End: 2017-07-06 | Stop reason: HOSPADM

## 2017-07-03 RX ORDER — KETOROLAC TROMETHAMINE 30 MG/ML
30 INJECTION, SOLUTION INTRAMUSCULAR; INTRAVENOUS
Status: DISPENSED | OUTPATIENT
Start: 2017-07-03 | End: 2017-07-04

## 2017-07-03 RX ORDER — ONDANSETRON 2 MG/ML
INJECTION INTRAMUSCULAR; INTRAVENOUS AS NEEDED
Status: DISCONTINUED | OUTPATIENT
Start: 2017-07-03 | End: 2017-07-03 | Stop reason: HOSPADM

## 2017-07-03 RX ORDER — BUPIVACAINE HYDROCHLORIDE 7.5 MG/ML
INJECTION, SOLUTION EPIDURAL; RETROBULBAR AS NEEDED
Status: DISCONTINUED | OUTPATIENT
Start: 2017-07-03 | End: 2017-07-03 | Stop reason: HOSPADM

## 2017-07-03 RX ORDER — ACETAMINOPHEN 10 MG/ML
INJECTION, SOLUTION INTRAVENOUS AS NEEDED
Status: DISCONTINUED | OUTPATIENT
Start: 2017-07-03 | End: 2017-07-03 | Stop reason: HOSPADM

## 2017-07-03 RX ORDER — DOCUSATE SODIUM 100 MG/1
100 CAPSULE, LIQUID FILLED ORAL
Status: DISCONTINUED | OUTPATIENT
Start: 2017-07-03 | End: 2017-07-06 | Stop reason: HOSPADM

## 2017-07-03 RX ORDER — CEFAZOLIN SODIUM IN 0.9 % NACL 2 G/50 ML
2 INTRAVENOUS SOLUTION, PIGGYBACK (ML) INTRAVENOUS ONCE
Status: COMPLETED | OUTPATIENT
Start: 2017-07-03 | End: 2017-07-03

## 2017-07-03 RX ORDER — MORPHINE SULFATE 10 MG/ML
6 INJECTION, SOLUTION INTRAMUSCULAR; INTRAVENOUS
Status: ACTIVE | OUTPATIENT
Start: 2017-07-03 | End: 2017-07-04

## 2017-07-03 RX ORDER — HYDROCORTISONE 1 %
CREAM (GRAM) TOPICAL AS NEEDED
Status: DISCONTINUED | OUTPATIENT
Start: 2017-07-03 | End: 2017-07-06 | Stop reason: HOSPADM

## 2017-07-03 RX ORDER — SODIUM CHLORIDE 0.9 % (FLUSH) 0.9 %
5-10 SYRINGE (ML) INJECTION AS NEEDED
Status: DISCONTINUED | OUTPATIENT
Start: 2017-07-03 | End: 2017-07-06 | Stop reason: HOSPADM

## 2017-07-03 RX ORDER — ATROPINE SULFATE 0.4 MG/ML
INJECTION, SOLUTION ENDOTRACHEAL; INTRAMEDULLARY; INTRAMUSCULAR; INTRAVENOUS; SUBCUTANEOUS AS NEEDED
Status: DISCONTINUED | OUTPATIENT
Start: 2017-07-03 | End: 2017-07-03 | Stop reason: HOSPADM

## 2017-07-03 RX ORDER — ONDANSETRON 2 MG/ML
4 INJECTION INTRAMUSCULAR; INTRAVENOUS
Status: ACTIVE | OUTPATIENT
Start: 2017-07-03 | End: 2017-07-04

## 2017-07-03 RX ORDER — ACETAMINOPHEN 325 MG/1
650 TABLET ORAL
Status: DISCONTINUED | OUTPATIENT
Start: 2017-07-03 | End: 2017-07-06 | Stop reason: HOSPADM

## 2017-07-03 RX ORDER — MORPHINE SULFATE 0.5 MG/ML
INJECTION, SOLUTION EPIDURAL; INTRATHECAL; INTRAVENOUS AS NEEDED
Status: DISCONTINUED | OUTPATIENT
Start: 2017-07-03 | End: 2017-07-03 | Stop reason: HOSPADM

## 2017-07-03 RX ORDER — IBUPROFEN 400 MG/1
800 TABLET ORAL EVERY 8 HOURS
Status: DISCONTINUED | OUTPATIENT
Start: 2017-07-03 | End: 2017-07-06 | Stop reason: HOSPADM

## 2017-07-03 RX ORDER — SIMETHICONE 80 MG
80 TABLET,CHEWABLE ORAL
Status: DISCONTINUED | OUTPATIENT
Start: 2017-07-03 | End: 2017-07-06 | Stop reason: HOSPADM

## 2017-07-03 RX ADMIN — Medication 10 ML: at 19:24

## 2017-07-03 RX ADMIN — SODIUM CHLORIDE, SODIUM LACTATE, POTASSIUM CHLORIDE, CALCIUM CHLORIDE: 600; 310; 30; 20 INJECTION, SOLUTION INTRAVENOUS at 11:57

## 2017-07-03 RX ADMIN — SODIUM CHLORIDE, SODIUM LACTATE, POTASSIUM CHLORIDE, AND CALCIUM CHLORIDE 125 ML/HR: 600; 310; 30; 20 INJECTION, SOLUTION INTRAVENOUS at 21:46

## 2017-07-03 RX ADMIN — CEFAZOLIN 2 G: 1 INJECTION, POWDER, FOR SOLUTION INTRAMUSCULAR; INTRAVENOUS; PARENTERAL at 11:50

## 2017-07-03 RX ADMIN — KETOROLAC TROMETHAMINE 30 MG: 30 INJECTION, SOLUTION INTRAMUSCULAR at 19:24

## 2017-07-03 RX ADMIN — MORPHINE SULFATE 250 MCG: 0.5 INJECTION, SOLUTION EPIDURAL; INTRATHECAL; INTRAVENOUS at 12:01

## 2017-07-03 RX ADMIN — ATROPINE SULFATE 0.2 MG: 0.4 INJECTION, SOLUTION ENDOTRACHEAL; INTRAMEDULLARY; INTRAMUSCULAR; INTRAVENOUS; SUBCUTANEOUS at 12:11

## 2017-07-03 RX ADMIN — SODIUM CHLORIDE, SODIUM LACTATE, POTASSIUM CHLORIDE, AND CALCIUM CHLORIDE 1000 ML: 600; 310; 30; 20 INJECTION, SOLUTION INTRAVENOUS at 10:22

## 2017-07-03 RX ADMIN — KETOROLAC TROMETHAMINE 30 MG: 30 INJECTION, SOLUTION INTRAMUSCULAR at 13:32

## 2017-07-03 RX ADMIN — BUPIVACAINE HYDROCHLORIDE 13 MG: 7.5 INJECTION, SOLUTION EPIDURAL; RETROBULBAR at 12:01

## 2017-07-03 RX ADMIN — Medication: at 12:24

## 2017-07-03 RX ADMIN — ONDANSETRON 4 MG: 2 INJECTION INTRAMUSCULAR; INTRAVENOUS at 12:08

## 2017-07-03 RX ADMIN — ACETAMINOPHEN 1000 MG: 10 INJECTION, SOLUTION INTRAVENOUS at 12:33

## 2017-07-03 NOTE — ROUTINE PROCESS
TRANSFER - IN REPORT:    Verbal report received from SHADE Forrester RN(name) on Reena Hobson  being received from L&D(unit) for routine progression of care      Report consisted of patients Situation, Background, Assessment and   Recommendations(SBAR). Information from the following report(s) SBAR was reviewed with the receiving nurse. Opportunity for questions and clarification was provided. Assessment completed upon patients arrival to unit and care assumed. Parents educated on safe sleep environment for . Verbalized understanding. Do parents have a safe sleep environment:YES    Parents request a Baby Box:NO      If Baby Box requested must complete and check all below:       [] Nurse reviewed certifcate from videos. [] Baby Box given to parents. [] Education completed on use of Baby Box. [] Referral Form Completed. [] Release Form Signed.      [] Copy of Release Form put in mother's chart     [] Mom sticker put on clipboard

## 2017-07-03 NOTE — ANESTHESIA POSTPROCEDURE EVALUATION
Post-Anesthesia Evaluation and Assessment    Patient: Bailey Tello MRN: [id number]  SSN: [id number]    YOB: 1975  Age: 38 y.o. Sex: female       Cardiovascular Function/Vital Signs  Visit Vitals    /59    Pulse 90    Temp 36.4 °C (97.6 °F)    Resp 16    Ht 6' (1.829 m)    Wt 101.6 kg (224 lb)    SpO2 96%    Breastfeeding Unknown    BMI 30.38 kg/m2       Patient is status post spinal anesthesia for Procedure(s):   SECTION MULTIPLE. Nausea/Vomiting: None    Postoperative hydration reviewed and adequate. Pain:  Pain Scale 1: Numeric (0 - 10) (17 1400)  Pain Intensity 1: 2 (17 1400)   Managed    Neurological Status:   Neuro (WDL): Within Defined Limits (17 1302)   At baseline    Mental Status and Level of Consciousness: Arousable    Pulmonary Status:   O2 Device: Room air (17 1302)   Adequate oxygenation and airway patent    Complications related to anesthesia: None    Post-anesthesia assessment completed.  No concerns    Signed By: Andrew Milton MD     July 3, 2017

## 2017-07-03 NOTE — IP AVS SNAPSHOT
Current Discharge Medication List  
  
START taking these medications Dose & Instructions Dispensing Information Comments Morning Noon Evening Bedtime  
 ibuprofen 600 mg tablet Commonly known as:  MOTRIN Your last dose was: Your next dose is:    
   
   
 Dose:  600 mg Take 1 Tab by mouth every eight (8) hours as needed for Pain. Quantity:  36 Tab Refills:  2  
     
   
   
   
  
 oxyCODONE-acetaminophen 5-325 mg per tablet Commonly known as:  PERCOCET Your last dose was: Your next dose is:    
   
   
 Dose:  1 Tab Take 1 Tab by mouth every four (4) hours as needed for Pain. Max Daily Amount: 6 Tabs. Quantity:  30 Tab Refills:  0 CONTINUE these medications which have NOT CHANGED Dose & Instructions Dispensing Information Comments Morning Noon Evening Bedtime  
 prenatal multivit-ca-min-fe-fa Tab Your last dose was: Your next dose is: Take  by mouth. Refills:  0 STOP taking these medications   
 aspirin 81 mg chewable tablet  
   
  
 folic acid 1 mg tablet Commonly known as:  Alfa Mariscal your doctor about these medications Dose & Instructions Dispensing Information Comments Morning Noon Evening Bedtime HYDROcodone-acetaminophen 5-325 mg per tablet Commonly known as:  Edie Dubose Your last dose was: Your next dose is:    
   
   
 Dose:  1 Tab Take 1 Tab by mouth every four (4) hours as needed for Pain. Max Daily Amount: 6 Tabs. Quantity:  30 Tab Refills:  0 Where to Get Your Medications Information on where to get these meds will be given to you by the nurse or doctor. ! Ask your nurse or doctor about these medications HYDROcodone-acetaminophen 5-325 mg per tablet  
 ibuprofen 600 mg tablet  
 oxyCODONE-acetaminophen 5-325 mg per tablet

## 2017-07-03 NOTE — PROGRESS NOTES
NUTRITION       Nutrition screening referral was triggered based on results obtained during nursing admission assessment. The patient's chart was reviewed and nutrition assessment is not indicated at this time. Plan to see patient for rescreen as indicated. Thank you.      [address]

## 2017-07-03 NOTE — ANESTHESIA PREPROCEDURE EVALUATION
Anesthetic History   No history of anesthetic complications            Review of Systems / Medical History  Patient summary reviewed, nursing notes reviewed and pertinent labs reviewed    Pulmonary  Within defined limits                 Neuro/Psych   Within defined limits           Cardiovascular  Within defined limits                     GI/Hepatic/Renal  Within defined limits              Endo/Other  Within defined limits           Other Findings              Physical Exam    Airway  Mallampati: I  TM Distance: > 6 cm  Neck ROM: normal range of motion   Mouth opening: Normal     Cardiovascular  Regular rate and rhythm,  S1 and S2 normal,  no murmur, click, rub, or gallop             Dental  No notable dental hx       Pulmonary  Breath sounds clear to auscultation               Abdominal  GI exam deferred       Other Findings            Anesthetic Plan    ASA: 2  Anesthesia type: spinal          Induction: Intravenous  Anesthetic plan and risks discussed with: Patient

## 2017-07-03 NOTE — ANESTHESIA PROCEDURE NOTES
Spinal Block    Start time: 7/3/2017 11:58 AM  End time: 7/3/2017 12:02 PM  Performed by: Scott Huff  Authorized by: Scott Huff     Pre-procedure:   Indications: primary anesthetic  Preanesthetic Checklist: patient identified, risks and benefits discussed, anesthesia consent, site marked, patient being monitored and timeout performed    Timeout Time: 11:58          Spinal Block:   Patient Position:  Seated    Prep: Betadine      Location:  L3-4  Technique:  Single shot        Needle:   Needle Type:  Pencil-tip  Needle Gauge:  25 G  Attempts:  1      Events: CSF confirmed, no blood with aspiration and no paresthesia        Assessment:  Insertion:  Uncomplicated  Patient tolerance:  Patient tolerated the procedure well with no immediate complications

## 2017-07-03 NOTE — PROGRESS NOTES
1000-patient arrived for scheduled c section    1123-Dr Sendy Pearson at the bedside, completed bedside US to verify placement    1524-TRANSFER - OUT REPORT:    Verbal report given to JEFFRY Ellis RN(name) on Go Alcaraz  being transferred to MIU(unit) for routine post - op       Report consisted of patients Situation, Background, Assessment and   Recommendations(SBAR). Information from the following report(s) SBAR, Intake/Output, MAR and Recent Results was reviewed with the receiving nurse. Lines:   Peripheral IV 07/03/17 Left Wrist (Active)   Site Assessment Clean, dry, & intact 7/3/2017 10:31 AM   Phlebitis Assessment 0 7/3/2017 10:31 AM   Infiltration Assessment 0 7/3/2017 10:31 AM   Dressing Status Clean, dry, & intact 7/3/2017 10:31 AM   Dressing Type Tape;Transparent 7/3/2017 10:31 AM   Hub Color/Line Status Pink; Infusing 7/3/2017 10:31 AM   Action Taken Blood drawn 7/3/2017 10:31 AM        Opportunity for questions and clarification was provided.

## 2017-07-03 NOTE — OP NOTES
Primary  Operative Note    Name: [address] [facility],  [address] Record Number: [id number]   YOB: 1975  Today's Date: July 3, 2017      Pre-operative Diagnosis: TWINS di/di at term    Post-operative Diagnosis: same but delivered    Estimated Blood Loss: 700cc    Operation: Low Cervical Transverse Procedure(s):   SECTION MULTIPLE    Surgeon(s):  MD Joann Vance MD    Anesthesia: Spinal    Prophylactic Antibiotics: Ancef  DVT Prophylaxis: Sequential Compression Devices         Fetal Description: multiple gestation 2     Birth Information:   Information for the patient's :  Momo Rolls [[id number]]   Delivery of a 7 lb 3.3 oz (3.27 kg) Male [2] infant on 7/3/2017 at 12:21 PM. Apgars were 8 and 9. Umbilical Cord:     Umbilical Cord Events:     Placenta:  removal with  appearance. Amniotic Fluid Volume: Moderate     Amniotic Fluid Description:  Clear    Information for the patient's :  Momo Rolls [[id number]]   Delivery of a 6 lb 13.4 oz (3.1 kg) Male [2] infant on 7/3/2017 at 12:23 PM. Apgars were 7 and 8. Umbilical Cord:     Umbilical Cord Events:     Placenta:  removal with  appearance. Amniotic Fluid Volume:        Amniotic Fluid Description:                               Complications:  none  INDICATION:  38 y.o.  now at [address] with di/di twins; vtx/ transverse lie  Procedure Detail:      After proper patient identification and consent,  the patient was taken to the operating room, where spinal anesthesia was administered and found to be adequate. Britt catheter had been placed using sterile technique. The patient was prepped and draped in the normal sterile fashion. The abdomen was entered using the Pfannenstiel technique. The peritoneum was entered sharply well superior to the bladder without any apparent injury. The bladder flap was created without difficulty.    A low transverse uterine incision was made with the scalpel and extended with blunt finger dissection. Amniotomy of twin A was performed and the fluid was medium amount clear. The babys head was then delivered without difficulty and atraumatically. The nose and mouth were suctioned. The cord was clamped and cut and the baby was handed off to Nursing staff in attendance. Feet of twin B were able to be grasped and brought down to the incisional opening after which AROM twin B performed with a moderate amount of clear fluid noted. Gentle traction delivered the remainder of the baby with head held in flection. Mouth and nares were suctioned. Delayed cord clamping was performed and infant transferred to nursing staff in attendance. Placenta was then removed from the uterus. The uterus was curettaged with a moist lap pad and cleared of all clots and debris. The uterus was noted to involute well with massage and IV pitocin. The uterine incision was closed with 0 monocryl, double layer  in running locking fashion with good hemostasis assured. The anterior pelvis was irrigated with warm normal saline and excellent  hemostasis was confirmed throughout. The peritoneum  was reapproximated with 2-0 vicryl and after re approximation of peritoneum. The fascia was closed with 0 PDS in a running fashion. Good hemostasis was assured. The skin was closed with a 3-0 vicryl subcuticular closure. Dermabond was applied. The patient tolerated the procedure well. Sponge, lap, and needle counts were correct times three and the patient and baby were taken to recovery/postpartum room in stable condition.     Nelson Fajardo MD  July 3, 2017  4:45 PM

## 2017-07-03 NOTE — L&D DELIVERY NOTE
Delivery Summary    Patient: Shahid Ames MRN: [id number]  SSN: [id number]    YOB: 1975  Age: 38 y.o.   Sex: female        Labor Events:    Labor:    Chawla Mccauley [address] [[id number]]   No     Chawla Mccauley 2 [place] [[id number]]   No     Rupture Date:    FlorindaDelfigo Securitykellen [place] [[id number]]   7/3/2017     [address] [place] [address] [[id number]]         Rupture Time:    Russ [place] [[id number]]   12:20 PM     Chawla Mccauley 2 Essie [[id number]]         Rupture Type:    Chawla Mccauley 1 [place] [[id number]]   AROM     Chawla Mccauley 2 Ess[place] [[id number]]         Amniotic Fluid Volume:      Amniotic Fluid Description:  Amniotic fluid Odor:    Chawla Mccauley 1 Essie [[id number]]   Dewaine Mole, BOY 2 Essie [[id number]]            Chawla Mccauley 1 Essie [[id number]]   None     Chawla Mccauley 2 Essie [[id number]]          Induction:    Chawla Mccauley [address] [facility] [[id number]]   None     Chawla Mccauley 2 [place] [[id number]]             Induction Date:       Russ [place] [[id number]]         [address] [place] [address] [[id number]]          Induction Time:    Chawla Mccauley [address] [facility] [[id number]]         [address] [place] [address] [[id number]]          Indications for Induction:    Chawla Mccauley 1 Ess[place] [[id number]]         [address] [place] [address] [[id number]]          Augmentation:    Chawla Mccauley 1 Essie [[id number]]   None     Chawla Mccauley 2 Essie [[id number]]         Augmentation Date:    Chawla Mccauley 1 Essie [[id number]]         Chawla Mccauley 2 Essie [[id number]]         Augmentation Time:    Chawla Mccauley 1 Essie [[id number]]         [address] [place] [address] [[id number]]         Indications for Augmentation:    Chawla Mccauley [address] [facility] [[id number]]         Russ [place] [[id number]]         Events:       Chawla Mccauley [address] [[id number]]         Chawla Mccauley 2 [place] [[id number]]         Cervical Ripening:    Chawla Mccauley [facility] [[id number]]         Chawla Mccauley 2 Essie [[id number]]            Chawla Mccauley [address] [place] [address] [[id number]]         Chawla Mccauley 2 Essie [[id number]]            [address], BOY 1 Hyacinth Cast [[id number]]         Meldon Rides 2 [place] [[id number]]         Rupture Identifier:    Desiree Forman [[id number]]   Rupture [address] [[id number]]          Labor complications:    Meldon Rides [address] [place] [address] [[id number]]   None     Meldon Rides 2 Hyacinth Cast [[id number]]          Additional complications:    Meldon Rides [address] [place] [address] [[id number]]         Meldon Rides 2 Essie [[id number]]            Delivery Events:  Estimated Blood Loss (ml):    Meldon Rides [address] [place] [address] [[id number]]         Meldon Rides 2 Essie [[id number]]           Information for the patient's :  Desiree Forman [[id number]]     Delivery Summary - Baby    Delivery Date: 7/3/2017  Delivery Time: 12:21 PM  Delivery Type: , Low Transverse    Section Delivery:     Sex:  male     Gestational Age: 37w4d  Delivery Clinician:  Joseph Benites   Living?: Living  Delivery Location: L&D           APGARS  One minute Five minutes Ten minutes   Skin color: 0   1        Heart rate: 2   2        Grimace: 2   2        Muscle tone: 2   2        Breathin   2        Totals: 8   9           Presentation: Vertex    Position:        Resuscitation Method:  Suctioning-bulb; Tactile Stimulation     Meconium Stained: None      Cord Information: 3 Vessels   Complications: None  Cord Blood Sent?:  Yes    Blood Gases Sent?:  No    Placenta:  Date/Time: 7/3 12:25 PM  Removal: Expressed      Appearance: Normal      Measurements:  Birth Weight: 7 lb 3.3 oz (3.27 kg)      Birth Length: 1' 8.5\" (0.521 m)      Head Circumference: 1' 2.17\" (0.36 m)      Chest Circumference:       Abdominal Girth: 1' 0.99\" (0.33 m)    Other Providers:   NADJA LOPEZ;SARAHI MARAVILLA;SARAH PAYNE;;;;;;; Obstetrician;Primary Nurse;Primary [place] Nurse;Nicu Nurse;Neonatologist;Anesthesiologist;Crna;Nurse Practitioner;Midwife;Nursery Nurse       Information for the patient's :  Desiree Forman [[id number]]     Delivery Summary - Baby    Delivery Date: 7/3/2017  Delivery Time: 12:23 PM  Delivery Type: , Low Transverse    Section Delivery:     Sex:  male     Gestational Age: 37w4d  Delivery Clinician:  Cj Villalobos   Living?: Living  Delivery Location: OR           APGARS  One minute Five minutes Ten minutes   Skin color: 0   1        Heart rate: 2   2        Grimace: 2   2        Muscle tone: 1   1        Breathin   2        Totals: 7   8           Presentation: Transverse    Position:        Resuscitation Method:  Suctioning-bulb; Tactile Stimulation     Meconium Stained: None      Cord Information: 3 Vessels   Complications: None  Cord Blood Sent?:  Yes    Blood Gases Sent?:  No    Placenta:  Date/Time:    Removal: Expressed      Appearance: Normal      Measurements:  Birth Weight: 6 lb 13.4 oz (3.1 kg)      Birth Length: 1' 9\" (0.533 m)      Head Circumference: 1' 2.37\" (0.365 m)      Chest Circumference:       Abdominal Girth: 1' 1.19\" (0.335 m)    Other Providers:   NADJA LOPEZ;SARAHI MARAVILLA;REBEKAH VERMA;;;SALVADOR RUSSO;;;; Obstetrician;Primary Nurse;Primary [place] Nurse;Nicu Nurse;Neonatologist;Anesthesiologist;Crna;Nurse Practitioner;Midwife;Nursery Nurse           Group Beta Strep:   Lab Results   Component Value Date/Time    GrBStrep, External negative 2017        Cord Blood Results:  Information for the patient's :  Ancelmo Hardy [[id number]]     Lab Results   Component Value Date/Time    ABORH O POSITIVE 2017 12:31 PM    PCTDIG NEG 2017 12:31 PM    BILI IF DIRECT LEONEL POSITIVE, BILIRUBIN TO FOLLOW 2017 12:31 PM     Information for the patient's :  Ancelmo Hardy [[id number]]     Lab Results   Component Value Date/Time    ABORH A POSITIVE 2017 12:31 PM    PCTDIG NEG 2017 12:31 PM    BILI IF DIRECT LEONEL POSITIVE, BILIRUBIN TO FOLLOW 2017 12:31 PM     Information for the patient's :  Ancelmo Hardy [[id number]]   No results found for: APH, APCO2, APO2, AHCO3, ABEC, ABDC, O2ST, SITE, RSCOM, PHI, Veronica, PO2I, HCO3I, SO2I, IBD  Information for the patient's :  Vee Vega [[id number]]   No results found for: APH, APCO2, APO2, AHCO3, ABEC, ABDC, O2ST, SITE, [place], PHI, Veronica, PO2I, [place], SO2I, IBD    Information for the patient's :  Vee Vega [[id number]]   No results found for: EPHV, PCO2V, PO2V, HCO3V, O2STV, EBDV  Information for the patient's :  Vee Vega [[id number]]   No results found for: EPHV, PCO2V, PO2V, HCO3V, O2STV, EBDV

## 2017-07-03 NOTE — LACTATION NOTE
This note was copied from a baby's chart. Initial Lactation Consultation - Twin boys born by  today at  42 weeks and 6 days. Mom has very small breasts but her nipples are everted. Both babies have been to the breast and they both have latched and nursed well this afternoon. I helped mom with latching twin B this evening. I showed mom how get the baby positioned so that he is laying up close to her. She should support his head and pull him close when he opens wide so that she can get a deep latch. Feeding Plan: Mother will keep baby skin to skin as often as possible, feed on demand, respond to feeding cues, obtain latch, listen for audible swallowing, be aware of signs of oxytocin release/ cramping,thrist,sleepyness while breastfeeding, offer both breasts,and will not limit feedings. If babies begin to tire and not nurse as well mom may begin pumping to increase breast stimulation.

## 2017-07-04 LAB
BASOPHILS # BLD AUTO: 0 K/UL (ref 0–0.1)
BASOPHILS # BLD: 0 % (ref 0–1)
EOSINOPHIL # BLD: 0 K/UL (ref 0–0.4)
EOSINOPHIL NFR BLD: 0 % (ref 0–7)
ERYTHROCYTE [DISTWIDTH] IN BLOOD BY AUTOMATED COUNT: 13.7 % (ref 11.5–14.5)
HCT VFR BLD AUTO: 25.6 % (ref 35–47)
HGB BLD-MCNC: 8.4 G/DL (ref 11.5–16)
LYMPHOCYTES # BLD AUTO: 17 % (ref 12–49)
LYMPHOCYTES # BLD: 2.2 K/UL (ref 0.8–3.5)
MCH RBC QN AUTO: 26.8 PG (ref 26–34)
MCHC RBC AUTO-ENTMCNC: 32.8 G/DL (ref 30–36.5)
MCV RBC AUTO: 81.8 FL (ref 80–99)
MONOCYTES # BLD: 0.8 K/UL (ref 0–1)
MONOCYTES NFR BLD AUTO: 6 % (ref 5–13)
NEUTS SEG # BLD: 9.3 K/UL (ref 1.8–8)
NEUTS SEG NFR BLD AUTO: 77 % (ref 32–75)
PLATELET # BLD AUTO: 192 K/UL (ref 150–400)
RBC # BLD AUTO: 3.13 M/UL (ref 3.8–5.2)
WBC # BLD AUTO: 12.3 K/UL (ref 3.6–11)

## 2017-07-04 PROCEDURE — 85025 COMPLETE CBC W/AUTO DIFF WBC: CPT | Performed by: OBSTETRICS & GYNECOLOGY

## 2017-07-04 PROCEDURE — 65410000002 HC RM PRIVATE OB

## 2017-07-04 PROCEDURE — 36415 COLL VENOUS BLD VENIPUNCTURE: CPT | Performed by: OBSTETRICS & GYNECOLOGY

## 2017-07-04 PROCEDURE — 74011250637 HC RX REV CODE- 250/637: Performed by: OBSTETRICS & GYNECOLOGY

## 2017-07-04 PROCEDURE — 74011250636 HC RX REV CODE- 250/636: Performed by: ANESTHESIOLOGY

## 2017-07-04 RX ADMIN — IBUPROFEN 800 MG: 400 TABLET, FILM COATED ORAL at 22:28

## 2017-07-04 RX ADMIN — Medication 10 ML: at 01:29

## 2017-07-04 RX ADMIN — KETOROLAC TROMETHAMINE 30 MG: 30 INJECTION, SOLUTION INTRAMUSCULAR at 07:37

## 2017-07-04 RX ADMIN — Medication 10 ML: at 07:37

## 2017-07-04 RX ADMIN — KETOROLAC TROMETHAMINE 30 MG: 30 INJECTION, SOLUTION INTRAMUSCULAR at 01:26

## 2017-07-04 RX ADMIN — IBUPROFEN 800 MG: 400 TABLET, FILM COATED ORAL at 14:11

## 2017-07-04 NOTE — PROGRESS NOTES
Anesthesia Post Operative Day 1      The patient is status post C Section with spinal  anesthesia and Duramorph for pain. The patient relates the following scales: pain,none ; itching, none ; nausea, none. All sympyoms were treated with medications per protocol. The patient is up and ambulating and has minimal complaints. Plan: Continue to treat breakthrough symptoms as needed with protocol medictions.

## 2017-07-04 NOTE — ROUTINE PROCESS
Received OB SBAR Report at bedside from Octavia Botello RN    0600 pt due to void complete, tolerated ambulation, fundus firm at U -1

## 2017-07-04 NOTE — ROUTINE PROCESS
Bedside and Verbal shift change report given to Renetta Smith RN (oncoming nurse) by Kimberly Rosa RN (offgoing nurse). Report included the following information SBAR, Kardex, Procedure Summary, MAR and Recent Results.      3193-4990 Hourly rounds complete  8342-3723 Hourly rounds complete

## 2017-07-04 NOTE — PROGRESS NOTES
Post Operative Day #1- C section    Discussed surgery and answered any questions regarding delivery. Patient voiding and adequate pain control. Voiding and passing flatus. VSS/AF    HEENT- normal  Lungs- clear  Cor-RRR  Abd- FF, NT, @ umbilicus  Wound- c/d/i without erythema, induration or drainage  Extrem- negative edema or Blank's    Post op hemoglobin-        Recent Results (from the past 24 hour(s))   CBC WITH AUTOMATED DIFF    Collection Time: 07/04/17  5:57 AM   Result Value Ref Range    WBC 12.3 (H) 3.6 - 11.0 K/uL    RBC 3.13 (L) 3.80 - 5.20 M/uL    HGB 8.4 (L) 11.5 - 16.0 g/dL    HCT 25.6 (L) 35.0 - 47.0 %    MCV 81.8 80.0 - 99.0 FL    MCH 26.8 26.0 - 34.0 PG    MCHC 32.8 30.0 - 36.5 g/dL    RDW 13.7 11.5 - 14.5 %    PLATELET 335 691 - 141 K/uL    NEUTROPHILS 77 (H) 32 - 75 %    LYMPHOCYTES 17 12 - 49 %    MONOCYTES 6 5 - 13 %    EOSINOPHILS 0 0 - 7 %    BASOPHILS 0 0 - 1 %    ABS. NEUTROPHILS 9.3 (H) 1.8 - 8.0 K/UL    ABS. LYMPHOCYTES 2.2 0.8 - 3.5 K/UL    ABS. MONOCYTES 0.8 0.0 - 1.0 K/UL    ABS. EOSINOPHILS 0.0 0.0 - 0.4 K/UL    ABS. BASOPHILS 0.0 0.0 - 0.1 K/UL       Impression- POD #1 S/P C section- doing well    Plan- Routine post op care- advance diet as tolerated, ambulate and aggressive pulmonary toilet. ICS until ambulating for DVT prophyalxis. Blood type and Rh checked.      Earlene Dakin, MD

## 2017-07-05 PROCEDURE — 74011250637 HC RX REV CODE- 250/637: Performed by: OBSTETRICS & GYNECOLOGY

## 2017-07-05 PROCEDURE — 65410000002 HC RM PRIVATE OB

## 2017-07-05 RX ADMIN — IBUPROFEN 800 MG: 400 TABLET, FILM COATED ORAL at 14:57

## 2017-07-05 RX ADMIN — IBUPROFEN 800 MG: 400 TABLET, FILM COATED ORAL at 22:21

## 2017-07-05 RX ADMIN — HYDROCODONE BITARTRATE AND ACETAMINOPHEN 1 TABLET: 7.5; 325 TABLET ORAL at 17:19

## 2017-07-05 RX ADMIN — HYDROCODONE BITARTRATE AND ACETAMINOPHEN 1 TABLET: 7.5; 325 TABLET ORAL at 21:25

## 2017-07-05 RX ADMIN — IBUPROFEN 800 MG: 400 TABLET, FILM COATED ORAL at 06:15

## 2017-07-05 RX ADMIN — HYDROCODONE BITARTRATE AND ACETAMINOPHEN 1 TABLET: 5; 325 TABLET ORAL at 13:05

## 2017-07-05 NOTE — LACTATION NOTE
This note was copied from a baby's chart. Twin infants nursing well in tandem today, mother independently able to latch babies. Infants cluster feeding, pumping regimen not initiated due to frequency of feedings. Infants are satiated after nursing. Colostrum adequate to meet infants needs at this time, mother's feeding goals explored, mother plans to breastfeed but may consider formula feeding supplements if needed and when she returns to work.

## 2017-07-05 NOTE — ROUTINE PROCESS
OB SBAR received from YOVANY Rodarte RN    3440-5767: hourly rounds complete  4881-9398: hourly rounds complete

## 2017-07-05 NOTE — PROGRESS NOTES
Post-Operative  Day 2    Shakeel Srivastava     Assessment: Post-Op day 2, doing well    Plan:   1. Routine post-operative care    Information for the patient's :  Ray Devine [[id number]]   , Low Transverse  Information for the patient's :  Ray Devine [[id number]]   , Low Transverse   Patient doing well without significant complaint. Nausea and vomiting resolved, tolerating liquids, passing flatus, voiding and ambulating without difficulty. Vitals:  Visit Vitals    /68 (BP 1 Location: Left arm, BP Patient Position: At rest)    Pulse 66    Temp 98.5 °F (36.9 °C)    Resp 16    Ht 6' (1.829 m)    Wt 224 lb (101.6 kg)    SpO2 96%    Breastfeeding Unknown    BMI 30.38 kg/m2     Temp (24hrs), Av.3 °F (36.8 °C), Min:98.1 °F (36.7 °C), Max:98.5 °F (36.9 °C)        Exam:        Patient without distress. Abdomen, bowel sounds present, soft, expected tenderness, fundus firm                Wound incision clean, dry and intact               Lower extremities are negative for swelling, cords or tenderness. Labs:   Lab Results   Component Value Date/Time    WBC 12.3 2017 05:57 AM    WBC 8.2 2017 09:45 AM    WBC 12.0 2017 03:38 PM    HGB 8.4 2017 05:57 AM    HGB 10.5 2017 09:45 AM    HGB 10.9 2017 03:38 PM    HCT 25.6 2017 05:57 AM    HCT 32.5 2017 09:45 AM    HCT 33.8 2017 03:38 PM    PLATELET 748 5241 05:57 AM    PLATELET 408  09:45 AM    PLATELET 364  03:38 PM    Hgb, External 13.2 2016    Hct, External 38.8 2016    Platelet cnt., External 257 2016       No results found for this or any previous visit (from the past 24 hour(s)).

## 2017-07-06 VITALS
BODY MASS INDEX: 30.34 KG/M2 | TEMPERATURE: 98.1 F | OXYGEN SATURATION: 96 % | SYSTOLIC BLOOD PRESSURE: 108 MMHG | HEART RATE: 70 BPM | DIASTOLIC BLOOD PRESSURE: 69 MMHG | WEIGHT: 224 LBS | HEIGHT: 72 IN | RESPIRATION RATE: 16 BRPM

## 2017-07-06 PROCEDURE — 74011250637 HC RX REV CODE- 250/637: Performed by: OBSTETRICS & GYNECOLOGY

## 2017-07-06 RX ORDER — HYDROCODONE BITARTRATE AND ACETAMINOPHEN 5; 325 MG/1; MG/1
1 TABLET ORAL
Qty: 30 TAB | Refills: 0 | Status: SHIPPED | OUTPATIENT
Start: 2017-07-06 | End: 2017-08-15

## 2017-07-06 RX ORDER — IBUPROFEN 600 MG/1
600 TABLET ORAL
Qty: 36 TAB | Refills: 2 | Status: SHIPPED | OUTPATIENT
Start: 2017-07-06 | End: 2017-08-15

## 2017-07-06 RX ORDER — OXYCODONE AND ACETAMINOPHEN 5; 325 MG/1; MG/1
1 TABLET ORAL
Qty: 30 TAB | Refills: 0 | Status: SHIPPED | OUTPATIENT
Start: 2017-07-06 | End: 2017-08-15

## 2017-07-06 RX ADMIN — HYDROCODONE BITARTRATE AND ACETAMINOPHEN 1 TABLET: 7.5; 325 TABLET ORAL at 11:04

## 2017-07-06 RX ADMIN — HYDROCODONE BITARTRATE AND ACETAMINOPHEN 1 TABLET: 7.5; 325 TABLET ORAL at 06:18

## 2017-07-06 RX ADMIN — IBUPROFEN 800 MG: 400 TABLET, FILM COATED ORAL at 06:15

## 2017-07-06 NOTE — DISCHARGE SUMMARY
Obstetrical Discharge Summary     Name: Dona Casey MRN: [id number]  SSN: [id number]    YOB: 1975  Age: 38 y.o. Sex: female      Admit Date: 7/3/2017    Discharge Date: 2017     Admitting Physician: Jessica Shaikh MD     Attending Physician:  Jessica Shaikh MD     Admission Diagnoses: TWINS;Twins    Discharge Diagnoses:   Information for the patient's :  Irma Wills [[id number]]   Delivery of a 7 lb 3.3 oz (3.27 kg) male infant via , Low Transverse on 7/3/2017 at 12:21 PM  by . Apgars were 8 and 9. Information for the patient's :  Irma Wills [[id number]]   Delivery of a 7 lb 15 oz (3.6 kg) male infant via , Low Transverse on 7/3/2017 at 12:23 PM  by . Apgars were 7 and 8. Additional Diagnoses:   Hospital Problems  Date Reviewed: 2017          Codes Class Noted POA    Twins ICD-10-CM: Z38.5  ICD-9-CM: V27.9  7/3/2017 Unknown             Lab Results   Component Value Date/Time    Rubella, External immune 2016    GrBStrep, External negative 2017       Hospital Course: Normal hospital course following the delivery. Disposition at Discharge: Home or self care    Discharged Condition: Stable    Patient Instructions:   Current Discharge Medication List      START taking these medications    Details   ibuprofen (MOTRIN) 600 mg tablet Take 1 Tab by mouth every eight (8) hours as needed for Pain. Qty: 36 Tab, Refills: 2      oxyCODONE-acetaminophen (PERCOCET) 5-325 mg per tablet Take 1 Tab by mouth every four (4) hours as needed for Pain. Max Daily Amount: 6 Tabs. Qty: 30 Tab, Refills: 0         CONTINUE these medications which have NOT CHANGED    Details   prenatal multivit-ca-min-fe-fa tab Take  by mouth.          STOP taking these medications       folic acid (FOLVITE) 1 mg tablet Comments:   Reason for Stopping:         aspirin 81 mg chewable tablet Comments:   Reason for Stopping:               Reference my discharge instructions.     Follow-up Appointments   Procedures    FOLLOW UP VISIT Appointment in: 6 Weeks     Standing Status:   Standing     Number of Occurrences:   1     Order Specific Question:   Appointment in     Answer:   6 Weeks        Signed By:  Demian Bragg MD     July 6, 2017

## 2017-07-06 NOTE — ROUTINE PROCESS
0730: OB SBAR report received by Dieter Mukherjee RN. 1215: Discharge instructions reviewed with pt. All questions answered. Prescription given. Pt to go to Dr. Sid Brunner office to  [facility] prescription. Follow up in 6 weeks with Dr. Prince Blake.

## 2017-07-06 NOTE — DISCHARGE INSTRUCTIONS
POSTPARTUM DISCHARGE INSTRUCTIONS       Name:  Michael Asher  YOB: 1975  Admission Diagnosis:  TWINS  Twins     Discharge Diagnosis:    Problem List as of 2017  Date Reviewed: 2017          Codes Class Noted - Resolved    Twins ICD-10-CM: Z38.5  ICD-9-CM: V27.9  7/3/2017 - Present        Supervision of high risk elderly primigravida in second trimester ICD-10-CM: O09.512  ICD-9-CM: V23.81  3/26/2017 - Present    Overview Addendum  60:98 PM by Jonna Pete MD     Twins di/di--IUI Dr. Bryce Ward insemination on 10/18/16; Adam Ferreira- vtx/transv Dr Ganesh Hallman  Flu vaccine received elsewhere  BOYS x 2  SI joint pain   GERD  Insomnia  tdap 17  Physical therapist  Primary C/S 7/3/2017 pt notified                 Attending Physician:  Jonna Pete MD    Delivery Type:   Section: What to Expect at Home    Your Recovery:  A  section, or , is surgery to deliver your baby through a cut, called an incision that the doctor makes in your lower belly and uterus. You may have some pain in your lower belly and need pain medicine for 1 to 2 weeks. You can expect some vaginal bleeding for several weeks. You will probably need about 6 weeks to fully recover. It is important to take it easy while the incision is healing. Avoid heavy lifting, strenuous activities, or exercises that strain the belly muscles while you are recovering. Ask a family member or friend for help with housework, cooking, and shopping. This care sheet gives you a general idea about how long it will take for you to recover. But each person recovers at a different pace. Follow the steps below to get better as quickly as possible. How can you care for yourself at home? Activity  · Rest when you feel tired. Getting enough sleep will help you recover. · Try to walk each day. Start by walking a little more than you did the day before. Bit by bit, increase the amount you walk.  Walking boosts blood flow and helps prevent pneumonia, constipation, and blood clots. · Avoid strenuous activities, such as bicycle riding, jogging, weightlifting, and aerobic exercise, for 6 weeks or until your doctor says it is okay. · Until your doctor says it is okay, do not lift anything heavier than your baby. · Do not do sit-ups or other exercise that strain the belly muscles for 6 weeks or until your doctor says it is okay. · Hold a pillow over your incision when you cough or take deep breaths. This will support your belly and decrease your pain. · You may shower as usual. Pat the incision dry when you are done. · You will have some vaginal bleeding. Wear sanitary pads. Do not douche or use tampons until your doctor says it is okay. · Ask your doctor when you can drive again. · You will probably need to take at least 6 weeks off work. It depends on the type of work you do and how you feel. · Wait until you are healed (about 4 to 6 weeks) before you have sexual intercourse. Your doctor will tell you when it is okay to have sex. · Talk to your doctor about birth control. You can get pregnant even before your period returns. Also, you can get pregnant while you are breast-feeding. Incision care  Your skin is your body's first line of defense against germs, but an incision site leaves an easy way for germs to enter your body. To prevent infection:  · Clean your hands frequently and before and after changing any touching any dressings. · If you have strips of tape on the incision, leave the tape on for a week or until it falls off. · Look at your incision closely every day for any changes. Contact your doctor if you experience any signs of infection, such as fever or increased redness at the surgical site. · Wash the area daily with warm, soapy water, and pat it dry. Don't use hydrogen peroxide or alcohol, which can slow healing. You may cover the area with a gauze bandage if it weeps or rubs against clothing.  Change the bandage every day. · Keep the area clean and dry. Diet  · You can eat your normal diet. If your stomach is upset, try bland, low-fat foods like plain rice, broiled chicken, toast, and yogurt. · Drink plenty of fluids (unless your doctor tells you not to). · You may notice that your bowel movements are not regular right after your surgery. This is common. Try to avoid constipation and straining with bowel movements. You may want to take a fiber supplement every day. If you have not had a bowel movement after a couple of days, ask your doctor about taking a mild laxative. · If you are breast-feeding, do not drink any alcohol. Medicines  · Take pain medicines exactly as directed. · If the doctor gave you a prescription medicine for pain, take it as prescribed. · If you are not taking a prescription pain medicine, ask your doctor if you can take an over-the-counter medicine such as acetaminophen (Tylenol), ibuprofen (Advil, Motrin), or naproxen (Aleve), for cramps. Read and follow all instructions on the label. Do not take aspirin, because it can cause more bleeding. Do not take acetaminophen (Tylenol) and other acetaminophen containing medications (i.e. Percocet) at the same time. · If you think your pain medicine is making you sick to your stomach:  · Take your medicine after meals (unless your doctor has told you not to). · Ask your doctor for a different pain medicine. · If your doctor prescribed antibiotics, take them as directed. Do not stop taking them just because you feel better. You need to take the full course of antibiotics. Mental Health  · Many women get the \"baby blues\" during the first few days after childbirth. You may lose sleep, feel irritable, and cry easily. You may feel happy one minute and sad the next. Hormone changes are one cause of these emotional changes. Also, the demands of a new baby, along with visits from relatives or other family needs, add to a mother's stress. The \"baby blues\" often peak around the fourth day. Then they ease up in less than 2 weeks. · If your moodiness or anxiety lasts for more than 2 weeks, or if you feel like life is not worth living, you may have postpartum depression. This is different for each mother. Some mothers with serious depression may worry intensely about their infant's well-being. Others may feel distant from their child. Some mothers might even feel that they might harm their baby. A mother may have signs of paranoia, wondering if someone is watching her. · With all the changes in your life, you may not know if you are depressed. Pregnancy sometimes causes changes in how you feel that are similar to the symptoms of depression. · Symptoms of depression include:  · Feeling sad or hopeless and losing interest in daily activities. These are the most common symptoms of depression. · Sleeping too much or not enough. · Feeling tired. You may feel as if you have no energy. · Eating too much or too little. · [employer] ([employer]I) offers a Warm line; Chat with the Expert phone sessions; Information and Articles about Pregnancy and Postpartum Mood Disorders; Comprehensive List of Free Support Groups; Knowledgeable local coordinators who will offer support, information, and resources; Guide to Resources on [employer]; Calendar of events in the  mood disorders community; Latest News and Research; and [facility] [address] for [employer]. Remember - You are not alone; You are not to blame; With help, you will be well. 0-403-316-PPD(1700). WWW. POSTPARTUM. NET   · Writing or talking about death, such as writing suicide notes or talking about guns, knives, or pills. Keep the numbers for these national suicide hotlines: 5-221-590-TALK (8-610.951.1104) and 3-941-WULMTOV (4-728.673.1058). If you or someone you know talks about suicide or feeling hopeless, get help right away.     Other instructions  · If you breast-feed your baby, you may be more comfortable while you are healing if you place the baby so that he or she is not resting on your belly. Try tucking your baby under your arm, with his or her body along the side you will be feeding on. Support your baby's upper body with your arm. With that hand you can control your baby's head to bring his or her mouth to your breast. This is sometimes called the football hold. Follow-up care is a key part of your treatment and safety. Be sure to make and go to all appointments, and call your doctor if you are having problems. It's also a good idea to know your test results and keep a list of the medicines you take. When should you call for help? Call 911 anytime you think you may need emergency care. For example, call if:  · You are thinking of hurting yourself, your baby, or anyone else. · You passed out (lost consciousness). · You have symptoms of a blood clot in your lung (called a pulmonary embolism). These may include:  · Sudden chest pain. · Trouble breathing. · Coughing up blood. Call your doctor now or seek immediate medical care if:    · You have severe vaginal bleeding. · You are soaking through a pad each hour for 2 or more hours. · Your vaginal bleeding seems to be getting heavier or is still bright red 4 days after delivery. · You are dizzy or lightheaded, or you feel like you may faint. · You are vomiting or cannot keep fluids down. · You have a fever. · You have new or more belly pain. · You have loose stitches, or your incision comes open. · You have symptoms of infection, such as:  · Increased pain, swelling, warmth, or redness. · Red streaks leading from the incision. · Pus draining from the incision. · A fever  · You pass tissue (not just blood). · Your vaginal discharge smells bad. · Your belly feels tender or full and hard. · Your breasts are continuously painful or red.   · You feel sad, anxious, or hopeless for more than a few days. · You have sudden, severe pain in your belly. · You have symptoms of a blood clot in your leg (called a deep vein thrombosis), such as:  · Pain in your calf, back of the knee, thigh, or groin. · Redness and swelling in your leg or groin. · You have symptoms of preeclampsia, such as:  · Sudden swelling of your face, hands, or feet. · New vision problems (such as dimness or blurring). · A severe headache. · Your blood pressure is higher than it should be or rises suddenly. · You have new nausea or vomiting. Watch closely for changes in your health, and be sure to contact your doctor if you have any problems. Additional Information:  Not applicable    These are general instructions for a healthy lifestyle:    No smoking/ No tobacco products/ Avoid exposure to second hand smoke    Surgeon General's Warning:  Quitting smoking now greatly reduces serious risk to your health. Obesity, smoking, and sedentary lifestyle greatly increases your risk for illness    A healthy diet, regular physical exercise & weight monitoring are important for maintaining a healthy lifestyle    Recognize signs and symptoms of STROKE:    F-face looks uneven    A-arms unable to move or move unevenly    S-speech slurred or non-existent    T-time-call 911 as soon as signs and symptoms begin - DO NOT go       back to bed or wait to see if you get better - TIME IS BRAIN. I have had the opportunity to make my options or choices for discharge. I have received and understand these instructions.

## 2017-07-06 NOTE — PROGRESS NOTES
Post-Operative  Day 3    Bailey Tello       Assessment: Post-Op day 3, doing well    Plan:   1. Discharge home today  2. Follow up in office in 6 weeks with Mari Devine MD  3. Post partum activity/wound care advised, diet as tolerated  4. Discharge Medications: ibuprofen, percocet and medications prior to admission      Information for the patient's :  Turner Holliday [[id number]]   , Low Transverse  Information for the patient's :  Turner Holliday [[id number]]   , Low Transverse   Patient doing well without significant complaint. Tolerating diet, passing flatus, voiding and ambulating without difficulty    Vitals:  Visit Vitals    /64 (BP 1 Location: Left arm, BP Patient Position: At rest)    Pulse 72    Temp 98.1 °F (36.7 °C)    Resp 16    Ht 6' (1.829 m)    Wt 224 lb (101.6 kg)    SpO2 96%    Breastfeeding Unknown    BMI 30.38 kg/m2     Temp (24hrs), Av.2 °F (36.8 °C), Min:98 °F (36.7 °C), Max:98.3 °F (36.8 °C)        Exam:        Patient without distress. Abdomen, bowel sounds present, soft, expected tenderness, fundus firm                Wound incision clean, dry and intact               Lower extremities are negative for swelling, cords or tenderness. Labs:   Lab Results   Component Value Date/Time    WBC 12.3 2017 05:57 AM    WBC 8.2 2017 09:45 AM    WBC 12.0 2017 03:38 PM    HGB 8.4 2017 05:57 AM    HGB 10.5 2017 09:45 AM    HGB 10.9 2017 03:38 PM    HCT 25.6 2017 05:57 AM    HCT 32.5 2017 09:45 AM    HCT 33.8 2017 03:38 PM    PLATELET 269  05:57 AM    PLATELET 516  09:45 AM    PLATELET 070  03:38 PM    Hgb, External 13.2 2016    Hct, External 38.8 2016    Platelet cnt., External 257 2016       No results found for this or any previous visit (from the past 24 hour(s)).

## 2017-07-06 NOTE — LACTATION NOTE
This note was copied from a baby's chart. Twin infants continue to make progress with breastfeeding, but mother does not have enough colostrum at this point to satiate babies at each feeding. Wt loss has been 9 and 10%, prompting formula supplementation order from pediatrician. Babies are much more satisfied and sleep well after. Mother will continue breastfeeding then offering formula to meet babies needs until her milk is in, and adequate to meet their needs. She has declined use of pump and alternative feeding methods, preferring the ease of bottle feeding. Babies have not rejected the breast at this time. Mother is aware that bottle/nipple confusion is a risk, and that pumping may expedite milk transition. She has an appointment with lactation consultant at her pediatrician's office tomorrow. Mother states that she has no further questions for Lactation Consultant before discharge. Mother has [facility] phone number and agrees to call with questions or seek help from her provider if needed.

## 2017-08-15 ENCOUNTER — OFFICE VISIT (OUTPATIENT)
Dept: OBGYN CLINIC | Age: 42
End: 2017-08-15

## 2017-08-15 VITALS — WEIGHT: 192 LBS | DIASTOLIC BLOOD PRESSURE: 76 MMHG | SYSTOLIC BLOOD PRESSURE: 100 MMHG | BODY MASS INDEX: 26.04 KG/M2

## 2017-08-15 NOTE — PROGRESS NOTES
Postpartum evaluation    Chu Fletcher is a 38 y.o. female who presents for a postpartum exam.     She is now six weeks post primary  section. ; term twin boys; thriving    Her baby is doing well. She has had no menses since delivery. She has had the following significant problems since her delivery: none    The patient is bottle feeding without difficulty. The patient would like to use ocp for birth control. She is currently taking: no medications. She is due for her next AE in 6 months.      Visit Vitals    /76    Wt 192 lb (87.1 kg)    BMI 26.04 kg/m2       PHYSICAL EXAMINATION    Constitutional  · Appearance: well-nourished, well developed, alert, in no acute distress    HENT  · Head and Face: appears normal    ·      Gastrointestinal  · Abdominal Examination: abdomen non-tender to palpation, normal bowel sounds, no masses present; well healed LT scar  · Liver and spleen: no hepatomegaly present, spleen not palpable  · Hernias: no hernias identified    Genitourinary  · External Genitalia: normal appearance for age, no discharge present, no tenderness present, no inflammatory lesions present, no masses present, no atrophy present  · Vagina: normal vaginal vault without central or paravaginal defects, no discharge present, no inflammatory lesions present, no masses present  · Bladder: non-tender to palpation  · Urethra: appears normal  · Cervix: normal   · Uterus: normal size, shape and consistency  · Adnexa: no adnexal tenderness present, no adnexal masses present  · Perineum: perineum within normal limits, no evidence of trauma, no rashes or skin lesions present  · Anus: anus within normal limits, no hemorrhoids present  · Inguinal Lymph Nodes: no lymphadenopathy present    Skin  · General Inspection: no rash, no lesions identified    Neurologic/Psychiatric  · Mental Status:  · Orientation: grossly oriented to person, place and time  · Mood and Affect: mood normal, affect appropriate    Assessment:  Normal postpartum check    Plan:  RTO for AE.   Considering ocp for menstrual control

## 2017-08-30 ENCOUNTER — TELEPHONE (OUTPATIENT)
Dept: OBGYN CLINIC | Age: 42
End: 2017-08-30

## 2017-08-30 NOTE — TELEPHONE ENCOUNTER
Patient calling stating that she delivered her twins by  on 7/3/7 and a little bit ago, the patient was using the bathroom and upon getting up from the toilet, she felt a popping/snapping sensation around the left side of her  incision and noticed that the corner has opened up and is slightly bleeding. Patient denies any oozing/drainage of color of than the spotting of blood. Spoke with Dr. Kimi Dyer nurse, gave her patient number and the nurse will contact patient back.

## 2017-08-30 NOTE — TELEPHONE ENCOUNTER
Patient advised per Dr. Fidelia ewing to clean and keep area dry. Cover with bandaid. Patient will call with any changes.

## 2017-11-08 ENCOUNTER — TELEPHONE (OUTPATIENT)
Dept: OBGYN CLINIC | Age: 42
End: 2017-11-08

## 2017-11-08 RX ORDER — FLUCONAZOLE 150 MG/1
150 TABLET ORAL DAILY
Qty: 1 TAB | Refills: 2 | Status: SHIPPED | OUTPATIENT
Start: 2017-11-08 | End: 2017-11-09

## 2017-11-08 NOTE — TELEPHONE ENCOUNTER
Patient called and left  on [facility] Triage line stating that she has a yeast infection and would like a rx for diflucan. Patient asked to speak with [facility].     Please contact patient back at [phone]

## 2018-04-06 ENCOUNTER — TELEPHONE (OUTPATIENT)
Dept: OBGYN CLINIC | Age: 43
End: 2018-04-06

## 2018-04-06 NOTE — TELEPHONE ENCOUNTER
Patient stating that she had her twins via  on 2017 and reports incisional pain that feels like its internal or under the skin of the incision. She states it hurts to sit down, run or walk. Patient has been rubbing scar ointment on it but believes this is not helping.,  Patient wants to come in to be seen by Dr. Liza Schroeder only on Monday. Patient aware that Dr. Liza Schroeder is out of the office today and asked to have a return call about an appointment for Monday. Please advise.

## 2018-04-09 NOTE — TELEPHONE ENCOUNTER
Norm Naqvi LPN        Caller: Unspecified (3 days ago,  2:17 PM)                     Patient can be scheduled for an appointment Thursday morning at 10:30am. Thanks

## 2018-04-09 NOTE — TELEPHONE ENCOUNTER
Patient aware of Dr. Mil Washington recommendation of coming on Thursday at 10:30am - patient accepted this appt.

## 2018-04-12 ENCOUNTER — OFFICE VISIT (OUTPATIENT)
Dept: OBGYN CLINIC | Age: 43
End: 2018-04-12

## 2018-04-12 VITALS — BODY MASS INDEX: 24.41 KG/M2 | WEIGHT: 180 LBS

## 2018-04-12 DIAGNOSIS — L76.82 PAIN AT SURGICAL INCISION: Primary | ICD-10-CM

## 2018-04-12 NOTE — PROGRESS NOTES
Tata Handy is a 38 y.o. female who complains of  Pain at the incision site from her  done 9m ago. Twins doing great; still nursing. Patient states not leakage or drainage from site. Pain now resolved. Extremely busy and back to running; doing [employer]. Normal urinary and bowel habits. Her relevant past medical history:   Past Medical History:   Diagnosis Date    Anemia     Basal cell carcinoma 5228    Complication of anesthesia     decreased HR    Infertility, female     6 rounds of IUI - not for infertility, but no partner        Past Surgical History:   Procedure Laterality Date    HX KNEE ARTHROSCOPY Right 2000    HX KNEE ARTHROSCOPY Right 2000    HX POLYPECTOMY  2016    uterine    HX SKIN BIOPSY      Basal cell carcinoma removed from face, nose, L and R arm and abdomen    HX WISDOM TEETH EXTRACTION       Social History     Occupational History    Not on file. Social History Main Topics    Smoking status: Never Smoker    Smokeless tobacco: Never Used    Alcohol use No    Drug use: No    Sexual activity: Not Currently     Partners: Male     Birth control/ protection: None     Family History   Problem Relation Age of Onset    Cancer Mother      soft tissue sarcoma    Diabetes Mother     Hypertension Mother     Cancer Father      prostate & skin    Bleeding Prob Brother      blood clot after surgery       Allergies   Allergen Reactions    Amoxicillin Other (comments)     Migraines    Anesthetic [Benzocaine-Aloe Vera] Other (comments)     ANESTHESIA results in decreased heart rate (in the 30s)  Has happened twice     Prior to Admission medications    Medication Sig Start Date End Date Taking? Authorizing Provider   prenatal multivit-ca-min-fe-fa tab Take  by mouth.     Historical Provider        Review of Systems - History obtained from the patient  Constitutional: negative for weight loss, fever, night sweats  HEENT: negative for hearing loss, earache, congestion, snoring, sorethroat  CV: negative for chest pain, palpitations, edema  Resp: negative for cough, shortness of breath, wheezing  Breast: negative for breast lumps, nipple discharge, galactorrhea  GI: negative for change in bowel habits, abdominal pain, black or bloody stools  : negative for frequency, dysuria, hematuria  MSK: negative for back pain, joint pain, muscle pain  Skin: negative for itching, rash, hives  Neuro: negative for dizziness, headache, confusion, weakness  Psych: negative for anxiety, depression, change in mood  Heme/lymph: negative for bleeding, bruising, pallor      Objective:  Visit Vitals    Wt 180 lb (81.6 kg)    LMP 03/17/2018 (Approximate)    Breastfeeding Unknown    BMI 24.41 kg/m2          PHYSICAL EXAMINATION    Constitutional  · Appearance: well-nourished, well developed, alert, in no acute distress    HENT  · Head and Face: appears normal    Gastrointestinal  · Abdominal Examination: abdomen non-tender to palpation, normal bowel sounds, no masses present; incision well healed; no evidence of hernia  · Liver and spleen: no hepatomegaly present, spleen not palpable  · Hernias: no hernias identified    Genitourinary  · External Genitalia: normal appearance for age, no discharge present, no tenderness present, no inflammatory lesions present, no masses present, no atrophy present  · Vagina: normal vaginal vault without central or paravaginal defects, no discharge present, no inflammatory lesions present, no masses present  · Bladder: non-tender to palpation  · Urethra: appears normal  · Cervix: normal   · Uterus: normal size, shape and consistency  · Adnexa: no adnexal tenderness present, no adnexal masses present  · Perineum: perineum within normal limits, no evidence of trauma, no rashes or skin lesions present  · Anus: anus within normal limits, no hemorrhoids present  · Inguinal Lymph Nodes: no lymphadenopathy present    Skin  · General Inspection: no rash, no lesions identified    Neurologic/Psychiatric  · Mental Status:  · Orientation: grossly oriented to person, place and time  · Mood and Affect: mood normal, affect appropriate    Assessment:    physiologic discomfort without clear etiology now resolved    Plan:    reassurance offered  Will update office if pain represents

## 2018-05-21 ENCOUNTER — OFFICE VISIT (OUTPATIENT)
Dept: OBGYN CLINIC | Age: 43
End: 2018-05-21

## 2018-05-21 VITALS — BODY MASS INDEX: 24.25 KG/M2 | WEIGHT: 178.8 LBS | DIASTOLIC BLOOD PRESSURE: 88 MMHG | SYSTOLIC BLOOD PRESSURE: 118 MMHG

## 2018-05-21 DIAGNOSIS — Z01.419 WELL WOMAN EXAM WITH ROUTINE GYNECOLOGICAL EXAM: Primary | ICD-10-CM

## 2018-05-21 NOTE — PROGRESS NOTES
Annual exam ages 40-58    Travis Herrera is a ,  38 y.o. female [facility] No LMP recorded. .    She presents for her annual checkup. She is having no significant problems. Twin boys now 10m; cruising; game on         Menstrual status:    Her periods are moderate in flow. She is using three to five pads or tampons per day, patient has been having irregular cycles. .    She denies dysmenorrhea. She reports no premenstrual symptoms. Contraception:    The current method of family planning is none. Sexual history:    She  reports that she does not currently engage in sexual activity but has had male partners.; She reports using the following method of birth control/protection: None. Medical conditions:    Since her last annual GYN exam about one year ago, she has not the following changes in her health history: none. Pap and Mammogram History:    Her most recent Pap smear was normal, obtained 1 year(s) ago. The patient has never had a mammogram.    Breast Cancer History/Substance Abuse: negative    Osteoporosis History:    Family history does not include a first or second degree relative with osteopenia or osteoporosis. Past Medical History:   Diagnosis Date    Anemia     Basal cell carcinoma 9593    Complication of anesthesia     decreased HR    Infertility, female     6 rounds of IUI - not for infertility, but no partner     Past Surgical History:   Procedure Laterality Date    HX KNEE ARTHROSCOPY Right 2000    HX KNEE ARTHROSCOPY Right 2000    HX POLYPECTOMY  2016    uterine    HX SKIN BIOPSY      Basal cell carcinoma removed from face, nose, L and R arm and abdomen    HX WISDOM TEETH EXTRACTION         Current Outpatient Prescriptions   Medication Sig Dispense Refill    prenatal multivit-ca-min-fe-fa tab Take  by mouth. Allergies: Amoxicillin and Anesthetic [benzocaine-aloe vera]     Tobacco History:  reports that she has never smoked.  She has never used smokeless tobacco.  Alcohol Abuse:  reports that she does not drink alcohol. Drug Abuse:  reports that she does not use illicit drugs.     Family Medical/Cancer History:   Family History   Problem Relation Age of Onset    Cancer Mother      soft tissue sarcoma    Diabetes Mother     Hypertension Mother     Cancer Father      prostate & skin    Bleeding Prob Brother      blood clot after surgery        Review of Systems - History obtained from the patient  Constitutional: negative for weight loss, fever, night sweats  HEENT: negative for hearing loss, earache, congestion, snoring, sorethroat  CV: negative for chest pain, palpitations, edema  Resp: negative for cough, shortness of breath, wheezing  GI: negative for change in bowel habits, abdominal pain, black or bloody stools  : negative for frequency, dysuria, hematuria, vaginal discharge  MSK: negative for back pain, joint pain, muscle pain  Breast: negative for breast lumps, nipple discharge, galactorrhea  Skin :negative for itching, rash, hives  Neuro: negative for dizziness, headache, confusion, weakness  Psych: negative for anxiety, depression, change in mood  Heme/lymph: negative for bleeding, bruising, pallor    Physical Exam    Visit Vitals    /88    Wt 178 lb 12.8 oz (81.1 kg)    BMI 24.25 kg/m2       Constitutional  · Appearance: well-nourished, well developed, alert, in no acute distress    HENT  · Head and Face: appears normal    Chest  · Respiratory Effort: breathing unlabored      Breasts  · Inspection of Breasts: breasts symmetrical, no skin changes, no discharge present, nipple appearance normal, no skin retraction present  · Palpation of Breasts and Axillae: no masses present on palpation, no breast tenderness  · Axillary Lymph Nodes: no lymphadenopathy present    Gastrointestinal  · Abdominal Examination: abdomen non-tender to palpation, normal bowel sounds, no masses present  · Liver and spleen: no hepatomegaly present, spleen not palpable  · Hernias: no hernias identified    Skin  · General Inspection: no rash, no lesions identified    Neurologic/Psychiatric  · Mental Status:  · Orientation: grossly oriented to person, place and time  · Mood and Affect: mood normal, affect appropriate    Genitourinary  · External Genitalia: normal appearance for age, no discharge present, no tenderness present, no inflammatory lesions present, no masses present, no atrophy present  · Vagina: normal vaginal vault without central or paravaginal defects, no discharge present, no inflammatory lesions present, no masses present  · Bladder: non-tender to palpation  · Urethra: appears normal  · Cervix: normal   · Uterus: normal size, shape and consistency  · Adnexa: no adnexal tenderness present, no adnexal masses present  · Perineum: perineum within normal limits, no evidence of trauma, no rashes or skin lesions present  · Anus: anus within normal limits, no hemorrhoids present  · Inguinal Lymph Nodes: no lymphadenopathy present    Assessment:  Routine gynecologic examination  Her current medical status is satisfactory with no evidence of significant gynecologic issues.     Plan:  Counseled re: diet, exercise, healthy lifestyle  Return for yearly wellness visits  Rec annual mammogram

## 2018-05-23 LAB
CYTOLOGIST CVX/VAG CYTO: NORMAL
CYTOLOGY CVX/VAG DOC THIN PREP: NORMAL
DX ICD CODE: NORMAL
LABCORP, 190119: NORMAL
Lab: NORMAL
OTHER STN SPEC: NORMAL
PATH REPORT.FINAL DX SPEC: NORMAL
STAT OF ADQ CVX/VAG CYTO-IMP: NORMAL

## 2019-05-24 ENCOUNTER — OFFICE VISIT (OUTPATIENT)
Dept: OBGYN CLINIC | Age: 44
End: 2019-05-24

## 2019-05-24 ENCOUNTER — HOSPITAL ENCOUNTER (OUTPATIENT)
Dept: MAMMOGRAPHY | Age: 44
Discharge: HOME | End: 2019-05-24
Attending: OBSTETRICS & GYNECOLOGY
Payer: COMMERCIAL

## 2019-05-24 VITALS
DIASTOLIC BLOOD PRESSURE: 76 MMHG | SYSTOLIC BLOOD PRESSURE: 112 MMHG | HEIGHT: 72 IN | WEIGHT: 185 LBS | BODY MASS INDEX: 25.06 KG/M2

## 2019-05-24 DIAGNOSIS — Z12.39 BREAST SCREENING, UNSPECIFIED: ICD-10-CM

## 2019-05-24 DIAGNOSIS — Z01.419 WELL WOMAN EXAM WITH ROUTINE GYNECOLOGICAL EXAM: Primary | ICD-10-CM

## 2019-05-24 PROBLEM — O09.512 SUPERVISION OF HIGH RISK ELDERLY PRIMIGRAVIDA IN SECOND TRIMESTER: Status: RESOLVED | Noted: 2017-03-26 | Resolved: 2019-05-24

## 2019-05-24 PROCEDURE — 77063 BREAST TOMOSYNTHESIS BI: CPT

## 2019-05-24 NOTE — PROGRESS NOTES
Annual exam ages 40-58    [facility] is a ,  41 y.o. female [facility] Patient's last menstrual period was 2019. .    She presents for her annual checkup. She is having no significant problems. Twin boys now 2         Menstrual status:    Her periods are moderate in flow. She is using three to five pads or tampons per day, usually regular and last 26-30 days. She denies dysmenorrhea. She reports no premenstrual symptoms. Contraception:    The current method of family planning is none. Sexual history:    She  reports that she does not currently engage in sexual activity but has had partners who are Male. She reports using the following method of birth control/protection: None. Medical conditions:    Since her last annual GYN exam about one year ago, she has not the following changes in her health history: none. Pap and Mammogram History:    Her most recent Pap smear was normal, obtained 1 year(s) ago. The patient has never had a mammogram.    Breast Cancer History/Substance Abuse: negative    Osteoporosis History:    Family history does not include a first or second degree relative with osteopenia or osteoporosis. Past Medical History:   Diagnosis Date    Anemia     Basal cell carcinoma 9148    Complication of anesthesia     decreased HR    Infertility, female     6 rounds of IUI - not for infertility, but no partner     Past Surgical History:   Procedure Laterality Date    HX KNEE ARTHROSCOPY Right 2000    HX KNEE ARTHROSCOPY Right 2000    HX POLYPECTOMY  2016    uterine    HX SKIN BIOPSY      Basal cell carcinoma removed from face, nose, L and R arm and abdomen    HX WISDOM TEETH EXTRACTION         Current Outpatient Medications   Medication Sig Dispense Refill    prenatal multivit-ca-min-fe-fa tab Take  by mouth. Allergies: Amoxicillin and Anesthetic [benzocaine-aloe vera]     Tobacco History:  reports that she has never smoked.  She has never used smokeless tobacco.  Alcohol Abuse:  reports that she does not drink alcohol. Drug Abuse:  reports that she does not use drugs.     Family Medical/Cancer History:   Family History   Problem Relation Age of Onset    Cancer Mother         soft tissue sarcoma    Diabetes Mother     Hypertension Mother     Cancer Father         prostate & skin    Bleeding Prob Brother         blood clot after surgery        Review of Systems - History obtained from the patient  Constitutional: negative for weight loss, fever, night sweats  HEENT: negative for hearing loss, earache, congestion, snoring, sorethroat  CV: negative for chest pain, palpitations, edema  Resp: negative for cough, shortness of breath, wheezing  GI: negative for change in bowel habits, abdominal pain, black or bloody stools  : negative for frequency, dysuria, hematuria, vaginal discharge  MSK: negative for back pain, joint pain, muscle pain  Breast: negative for breast lumps, nipple discharge, galactorrhea  Skin :negative for itching, rash, hives  Neuro: negative for dizziness, headache, confusion, weakness  Psych: negative for anxiety, depression, change in mood  Heme/lymph: negative for bleeding, bruising, pallor    Physical Exam    Visit Vitals  /76 (BP 1 Location: Left arm, BP Patient Position: Sitting)   Ht 6' (1.829 m)   Wt 185 lb (83.9 kg)   LMP 05/19/2019   BMI 25.09 kg/m²       Constitutional  · Appearance: well-nourished, well developed, alert, in no acute distress    HENT  · Head and Face: appears normal    Chest  · Respiratory Effort: breathing unlabored      Breasts  · Inspection of Breasts: breasts symmetrical, no skin changes, no discharge present, nipple appearance normal, no skin retraction present  · Palpation of Breasts and Axillae: no masses present on palpation, no breast tenderness  · Axillary Lymph Nodes: no lymphadenopathy present    Gastrointestinal  · Abdominal Examination: abdomen non-tender to palpation, normal bowel sounds, no masses present  · Liver and spleen: no hepatomegaly present, spleen not palpable  · Hernias: no hernias identified    Skin  · General Inspection: no rash, no lesions identified    Neurologic/Psychiatric  · Mental Status:  · Orientation: grossly oriented to person, place and time  · Mood and Affect: mood normal, affect appropriate    Genitourinary  · External Genitalia: normal appearance for age, no discharge present, no tenderness present, no inflammatory lesions present, no masses present, no atrophy present  · Vagina: normal vaginal vault without central or paravaginal defects, no discharge present, no inflammatory lesions present, no masses present  · Bladder: non-tender to palpation  · Urethra: appears normal  · Cervix: normal   · Uterus: normal size, shape and consistency  · Adnexa: no adnexal tenderness present, no adnexal masses present  · Perineum: perineum within normal limits, no evidence of trauma, no rashes or skin lesions present  · Anus: anus within normal limits, no hemorrhoids present  · Inguinal Lymph Nodes: no lymphadenopathy present    Assessment:  Routine gynecologic examination  Her current medical status is satisfactory with no evidence of significant gynecologic issues.     Plan:  Pap done today  Counseled re: diet, exercise, healthy lifestyle  Return for yearly wellness visits  Rec annual mammogram

## 2019-05-24 NOTE — PATIENT INSTRUCTIONS

## 2019-05-30 LAB
CYTOLOGIST CVX/VAG CYTO: NORMAL
CYTOLOGY CVX/VAG DOC CYTO: NORMAL
CYTOLOGY CVX/VAG DOC THIN PREP: NORMAL
DX ICD CODE: NORMAL
LABCORP, 190119: NORMAL
Lab: NORMAL
OTHER STN SPEC: NORMAL
STAT OF ADQ CVX/VAG CYTO-IMP: NORMAL

## 2019-11-01 ENCOUNTER — TELEPHONE (OUTPATIENT)
Dept: OBGYN CLINIC | Age: 44
End: 2019-11-01

## 2019-11-01 RX ORDER — FLUCONAZOLE 150 MG/1
TABLET ORAL
Qty: 2 TAB | Refills: 0 | Status: SHIPPED | OUTPATIENT
Start: 2019-11-01 | End: 2020-11-30

## 2019-11-01 NOTE — TELEPHONE ENCOUNTER
This nurse attempted to reach the patient and left a detailed message for the patient regarding MD recommendations and prescription sent as per verbal MD order to patient confirmed pharmacy. Patient advised to call back with any further questions.

## 2019-11-01 NOTE — TELEPHONE ENCOUNTER
Call received at 144PM    45year old patient last seen in the office on 5/24/19. Patient calling to say that she completed a course of antibiotics for a sinus infection. Patient states for the past two days having vaginal itching, irritation and small amount of discharge and is wondering about a prescription for diflucan.     Pharmacy confirmed  Please advise

## 2020-03-03 ENCOUNTER — NURSE TRIAGE (OUTPATIENT)
Dept: OTHER | Facility: CLINIC | Age: 45
End: 2020-03-03

## 2020-03-03 ENCOUNTER — APPOINTMENT (OUTPATIENT)
Dept: PHYSICAL THERAPY | Age: 45
End: 2020-03-03

## 2020-03-03 ENCOUNTER — HOSPITAL ENCOUNTER (OUTPATIENT)
Dept: PHYSICAL THERAPY | Age: 45
Discharge: HOME | End: 2020-03-03
Payer: COMMERCIAL

## 2020-03-03 PROCEDURE — 97161 PT EVAL LOW COMPLEX 20 MIN: CPT | Performed by: PHYSICAL THERAPIST

## 2020-03-03 PROCEDURE — 97110 THERAPEUTIC EXERCISES: CPT | Performed by: PHYSICAL THERAPIST

## 2020-03-03 NOTE — TELEPHONE ENCOUNTER
Reason for Disposition   Information only question and nurse able to answer    Protocols used: INFORMATION ONLY CALL - NO TRIAGE-ADULT-OH    Patient called in via employee benefit line to request number for [facility] [place] [facility]. Phone number provided and transferred. Please do not respond to the triage nurse through this encounter. Any subsequent communication should be directly with the patient.

## 2020-03-03 NOTE — PROGRESS NOTES
[facility] Physical Therapy  [address], [address]  Phone: [phone]  Fax: [phone]    Plan of Care/Statement of Necessity for Physical Therapy Services  2-15    Patient name: Catalina Luu  : 1975  Provider#: [id number]  Referral source: Ephraim Henry (Jody),*      Medical/Treatment Diagnosis: Other specified dorsopathies, cervical region [M53.82]     Prior Hospitalization: see medical history     Comorbidities: : OA R knee, lateral meniscectomy R knee, skin CA   Prior Level of Function: History of intermittent back pain over the past 20 years which lasts about 4 days and resolves, Neck pain for the past 2.5 years   Medications: Verified on Patient Summary List    Start of Care: 3/3/20      Onset Date: 2.5 years ago        The Plan of Care and following information is based on the information from the initial evaluation. Assessment/ key information: Patient presents with low back and neck pain with postural deficits and limited ROM limiting ability to perform functional activities such as push ups and running and lifting her sons. She would benefit from skilled PT to improve posture ROM and strength to decrease pain so she can return to prior level of function. Problem List: pain affecting function, decrease ROM, decrease strength and decrease activity tolerance   Treatment Plan may include any combination of the following: Therapeutic exercise, Therapeutic activities, Manual therapy, Patient education and Other: dry needling  Patient / Family readiness to learn indicated by: asking questions, trying to perform skills and interest  Persons(s) to be included in education: patient (P)  Barriers to Learning/Limitations: None  Patient Goal (s): I'd like to be able to go back to my workout routine. I'd like to be able to do push ups without pain.   Patient Self Reported Health Status: good  Rehabilitation Potential: good    Short Term Goals:  To be accomplished in 2 weeks: 1. Patient will be I in HEP to promote self management of symptoms. 2. Patient will report pain level at worst as less than or equal to 8/10 so they can be performed without pain. Long Term Goals: To be accomplished in 4-6 weeks:    1. Patient will report pain level at worst as less than or equal to 3/10 so they can be performed without pain. 2. Patient will be able to perform 10 push ups without pain. 3. Patient will be able to run > or = 5 miles without pain. 4. Patient will be able to lift her sons without neck or back pain. Frequency / Duration: Patient to be seen 2 times per week for 4 weeks. Patient/ Caregiver education and instruction: exercises    [x]  Plan of care has been reviewed with PAULINA Ellsworth, PT 3/3/2020 5:30 PM    ________________________________________________________________________    I certify that the above Therapy Services are being furnished while the patient is under my care. I agree with the treatment plan and certify that this therapy is necessary.     [de-identified] Signature:____________________  Date:____________Time: _________

## 2020-03-03 NOTE — PROGRESS NOTES
PT INITIAL EVALUATION NOTE - [facility] 2-15    Patient Name: Reema Sanches  Date:3/3/2020  : 1975  [x]  Patient  Verified  Payor: [address] / Plan: [facility] [employer] [address] / Product Type: Commerical /    In time:355PM  Out time:515PM  Total Treatment Time (min): 80  Total Timed Codes (min): 30   Visit #: 1     Treatment Area: Other specified dorsopathies, cervical region [M53.82]    SUBJECTIVE  Pain Level (0-10 scale): Neck: current 0 worst 8 best 0  Low back: current 2 worst 10 best 0    Any medication changes, allergies to medications, adverse drug reactions, diagnosis change, or new procedure performed?: [] No    [x] Yes (see summary sheet for update)  Subjective:    Patient referred to PT with a chief complaint of neck and R periscapular pain which began about 2.5 years ago when she had her twins. She was lifting them both up and felt a pull in her R UT. She has had pain ever since which has been worsening since 2019. The periscapular pain can wake her from sleeping at night. Able to adjust with pillow. She has seen Sam Leal for her neck in the past and was referred to PT for dry needling last year. She fell running in December and dislocated L shoulder  and L knee which prevented her from going to PT for her neck. She reports that the  before last she hurt her back which is improving which occurred with sit to stand transfers.   Pain Location: R side of neck base of skull into R periscapular region, R side of low back near PSIS   Pain Description: pulling in neck, periscapular region achy   Paresthesias: Inconsistent:  Numb to elbow worse with trigger point in periscapular region improved with massage   Aggravating Factors: push ups, lifting twins, running and sit to stand tranfers aggravate LBP   Relieving Factors: heating pad, hot shower, massage, Advil   Current Functional Limitations: Pain with push ups, pain with lifting 35 year old twins   PLOF: History of intermittent back pain over the past 20 years which lasts about 4 days and resolves, Neck pain for the past 2.5 years   PMHx: OA R knee, lateral meniscectomy R knee, skin CA   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 I'd like to be able to do push ups without pain.    Barriers: none   Motivation: good     OBJECTIVE/EXAMINATION  Observation: Squat test: increased lumbar flexion     Lumbar AROM: flexion fingertips 2 inches from floor, extension decreased 80% painful, R SB painful, L SB WFL     ROM:   Cervical AROM:   Flexion: 50 degrees  Extension: 50 degrees periscapular region  R side bendin degrees painful R persicapular region  L side bendin degrees painful R side of neck in area of UT  R rotation:  55 degrees  L rotation:  60 degrees       Shoulder AROM: WNL    Hip PROM: R hip IR 15 degrees all other B hip PROM WFL    Strength:   R Shoulder flexion: 4/5  R Elbow flexion: 5/5  R Elbow extension: 5/5  R shoulder ER 4+/5     L Shoulder flexion: 4/5  L Elbow flexion: 5/5  L Elbow extension: 5/5  R shoulder ER 4+/5     Palpation: R PSIS tender, myofascial trigger points and tenderness R UT, R infraspinatus     Joint mobility: Hypomobile PA glides L1-5          10 min Modality:[]  Ice     [x]  Heat R shoulder  []  Ice massage   Rationale: decrease pain and increase ROM to improve the patients ability to do push ups     [x] Skin assessment post-treatment:  [x]intact []redness- no adverse reaction    []redness - adverse reaction:     30 min Therapeutic Exercise:  [x] See flow sheet : Taught patient supine chin tuck, bridges, single leg bridges, quadruped alt LE, wall slide with pillow case ER   Rationale: increase ROM and increase strength to improve the patients ability to do push ups            With   [x] TE   [] TA   [] neuro   [] other: Patient Education: [x] Review HEP    [] Progressed/Changed HEP based on:   [] positioning   [] body mechanics   [] transfers   [] heat/ice application    [x] other: work place ergonomics for laptop documentation, chin tuck during push ups, bridges and single leg bridges prior to run for neuro reeducation    Other Objective/Functional Measures:NT    Pain Level (0-10 scale) post treatment: 0    ASSESSMENT/Changes in Function:     [x]  See Plan of [address] Mendes, PT 3/3/2020  3:53 PM

## 2020-03-10 ENCOUNTER — HOSPITAL ENCOUNTER (OUTPATIENT)
Dept: PHYSICAL THERAPY | Age: 45
Discharge: HOME | End: 2020-03-10
Payer: COMMERCIAL

## 2020-03-10 PROCEDURE — 97110 THERAPEUTIC EXERCISES: CPT | Performed by: PHYSICAL THERAPIST

## 2020-03-10 PROCEDURE — 97140 MANUAL THERAPY 1/> REGIONS: CPT | Performed by: PHYSICAL THERAPIST

## 2020-03-10 NOTE — PROGRESS NOTES
[address] [facility] [address] [place] [address]  Phone: [phone]  Fax: [phone]    Request for use of Dry Needling/Intramuscular Manual Therapy  Patient Name: Lord Lacey : 1975   Treatment/Medical Diagnosis: Other specified dorsopathies, cervical region [M53.82]   Referral Source: Ephraim Barrera (Jody) P,*     Date of Initial Visit: 3/3/20 Attended Visits: 2 Missed Visits: 0     Based on findings from the physical therapy examination and evaluation, the evaluating therapist believes the patient, Lord Lacey would benefit from including Dry Needling as part of the plan of care. Dry needling is an effective treatment technique utilized in conjunction with other physical therapy interventions to inactivate myofascial trigger points and the pain and dysfunction they cause. It involves the use of a very fine solid filament sterile needle which is inserted into the skin and directly into a myofascial trigger point. Repeated strokes or movements of the needle without completely withdrawing it help to inactive the trigger point, all of which may take approximately 30 - 60 seconds at each site. Benefits include inactivation of trigger points, decreased pain, increased muscle length, improved movement patterns, improved range of motion, and restoration of function. Potential risks include the following: post-needling soreness, infection, bruising/bleeding, penetration of a nerve, and pneumothorax. All treating Physical Therapists have been thoroughly educated in ways to avoid the adverse reactions. Dry Needling is an advanced procedure that requires additional training including greater than 54 hours of intensive course work.     Most treatment programs will consist of one session of dry needling each week and a possible second treatment to consist of muscle re-education, flexibility, strengthening and other manual techniques to facilitate the benefits from the dry needling therapy. If you agree with this recommendation, please sign the attached prescription form and fax it to us at [phone]. If you have questions or concerns regarding dry needling or any other treatment we may be providing, please contact us at [phone] or email at [email or web address]. Thank you for allowing us to assist in the care of your patient. Therapist Signature: Antoinette Portillo PT , DPT, CMPT Date: 3/10/2020     Time: 6:35 PM   NOTE TO PHYSICIAN:  PLEASE COMPLETE  BELOW AND FAX TO   [facility] Physical Therapy Tristian Ibarra: [phone]  ? I have read the above request and AGREE to the recommendation of including dry needling as part of the plan of care. ? I have read the above request and DO NOT AGREE to including dry needling as part of the plan of care.    ? I have read the above report and request that my patient continue therapy with the following changes/special instructions: _________________________________________________     Physician Signature:        Date:  Time:

## 2020-03-10 NOTE — PROGRESS NOTES
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 Every morning she wakes up and feels like she can't turn her head. It is improving since she stopped doing her chin tucks. OBJECTIVE    15 min Therapeutic Exercise:  [x] See flow sheet :   Rationale: increase ROM to improve the patients ability to perform ADLs without pain       25 min Manual Therapy: PA glides T1-7, IASTM B UT, levator scapulae, rhomboids    Rationale: decrease pain to improve the patients ability to perform ADLs without pain             With   [] TE   [] TA   [] neuro   [] other: Patient Education: [x] Review HEP    [] Progressed/Changed HEP based on:   [] positioning   [] body mechanics   [] transfers   [] heat/ice application    [] other:      Other Objective/Functional Measures: NT     Pain Level (0-10 scale) post treatment: 1-2    ASSESSMENT/Changes in Function:   Patient tolerated treatment today.    Patient will continue to benefit from skilled PT services to modify and progress therapeutic interventions, address functional mobility deficits, address ROM deficits, address strength deficits, analyze and address soft tissue restrictions, analyze and cue movement patterns and analyze and modify body mechanics/ergonomics to attain remaining goals. Progress towards goals / Updated goals:  Short Term Goals: To be accomplished in 2 weeks:               1. Patient will be I in HEP to promote self management of symptoms. NOT MET              2. Patient will report pain level at worst as less than or equal to 8/10 so they can be performed without pain. NOT MET  Long Term Goals: To be accomplished in 4-6 weeks:               1.  Patient will report pain level at worst as less than or equal to 3/10 so they can be performed without pain. 2. Patient will be able to perform 10 push ups without pain. 3. Patient will be able to run > or = 5 miles without pain. 4. Patient will be able to lift her sons without neck or back pain.     PLAN  [x]  Upgrade activities as tolerated     [x]  Continue plan of care  []  Update interventions per flow sheet       []  Discharge due to:_  []  Other:_      Tina Garsia, PT 3/10/2020

## 2020-03-19 ENCOUNTER — APPOINTMENT (OUTPATIENT)
Dept: PHYSICAL THERAPY | Age: 45
End: 2020-03-19
Payer: COMMERCIAL

## 2020-03-26 ENCOUNTER — APPOINTMENT (OUTPATIENT)
Dept: PHYSICAL THERAPY | Age: 45
End: 2020-03-26
Payer: COMMERCIAL

## 2020-03-31 ENCOUNTER — APPOINTMENT (OUTPATIENT)
Dept: PHYSICAL THERAPY | Age: 45
End: 2020-03-31
Payer: COMMERCIAL

## 2020-06-05 ENCOUNTER — OFFICE VISIT (OUTPATIENT)
Dept: OBGYN CLINIC | Age: 45
End: 2020-06-05

## 2020-06-05 VITALS
SYSTOLIC BLOOD PRESSURE: 122 MMHG | DIASTOLIC BLOOD PRESSURE: 78 MMHG | HEIGHT: 72 IN | WEIGHT: 194.5 LBS | BODY MASS INDEX: 26.34 KG/M2

## 2020-06-05 DIAGNOSIS — Z01.419 WELL WOMAN EXAM WITH ROUTINE GYNECOLOGICAL EXAM: Primary | ICD-10-CM

## 2020-06-05 NOTE — PATIENT INSTRUCTIONS
Pelvic Exam: Care Instructions Your Care Instructions When your doctor examines all of your pelvic organs, it's called a pelvic exam. Two good reasons to have this kind of exam are to check for sexually transmitted infections (STIs) and to get a Pap test. A Pap test is also called a Pap smear. It checks for early changes that can lead to cancer of the cervix. Sometimes a pelvic exam is part of a regular checkup. Your doctor may ask you to avoid vaginal sex, tampons, vaginal medicines, vaginal sprays or powders, and douching for 1 to 2 days before the test. 
Other times, women have this kind of exam at any time of the month. This is because they have pelvic pain, bleeding, or discharge. Or they may have another pelvic problem. Before your exam, it's important to share some information with your doctor. For example, if you are a survivor of rape or sexual abuse, you can talk about any concerns you may have. Your doctor will also want to know if you are pregnant or use birth control. And he or she will want to hear about any problems, surgeries, or procedures you have had in your pelvic area. You will also need to tell your doctor when your last period was. Follow-up care is a key part of your treatment and safety. Be sure to make and go to all appointments, and call your doctor if you are having problems. It's also a good idea to know your test results and keep a list of the medicines you take. How is a pelvic exam done? · During a pelvic exam, you will: ? Take off your clothes below the waist. You will get a paper or cloth cover to put over the lower half of your body. If this is regular checkup, you may undress completely and put on a gown. ? Lie on your back on an exam table. Your feet will be raised above you. Stirrups will support your feet. · The doctor will: ? Ask you to relax your knees. Your knees need to lean out, toward the walls. ? Check the opening of your vagina for sores or swelling. ? Gently put a tool called a speculum into your vagina. It opens the vagina a little bit. You will feel some pressure. But if you are relaxed, it will not hurt. It lets your doctor see inside the vagina. ? Use a small brush, spatula, or swab to get a sample of cells, if you are having a Pap test or culture. The doctor then removes the speculum. ? Put on gloves and put one or two fingers of one hand into your vagina. The other hand goes on your lower belly. This lets your doctor feel your pelvic organs. You will probably feel some pressure. Try to stay relaxed. ? Put one gloved finger into your rectum and one into your vagina, if needed. This can also help check your pelvic organs. This exam takes about 10 minutes. At the end, you will get a washcloth or tissue to clean your vaginal area. You can then get dressed. Why is a pelvic exam done? A pelvic exam may be done: · As part of a woman's regular physical checkup. The exam may include a Pap test. 
· To check for vaginal infection. · To check for sexually transmitted infections, such as chlamydia or herpes. · To help find the cause of abnormal uterine bleeding. · To look for problems like uterine fibroids, ovarian cysts, or uterine prolapse. · To find the cause of pelvic or belly pain. · Before inserting an intrauterine device (IUD) for birth control. · To collect evidence if you've been sexually assaulted. What are the risks of a pelvic exam? 
There is a small chance that the doctor will find something on a pelvic exam that would not have caused a problem. This is called overdiagnosis. It could lead to tests or treatment you don't need. When should you call for help? Watch closely for changes in your health, and be sure to contact your doctor if you have any problems. Where can you learn more? Go to http://laurence-timothy.info/ Enter T808 in the search box to learn more about \"Pelvic Exam: Care Instructions. \" Current as of: November 8, 2019               Content Version: 12.5 © 0170-3644 [employer]. Care instructions adapted under license by [employer] (which disclaims liability or warranty for this information). If you have questions about a medical condition or this instruction, always ask your healthcare professional. [place] [address] any warranty or liability for your use of this information.

## 2020-06-05 NOTE — PROGRESS NOTES
Annual exam ages 40-58    Susy Huggins is a ,  39 y.o. female [facility] Patient's last menstrual period was 2020. Bonidaliadeja Ronna She presents for her annual checkup. She is having no significant problems. Kids are great; Landrum Tubac and Gar[place] twins now 3       Menstrual status:    Her periods are heavy in flow. She is using five to ten pads or tampons per day, usually regular and last 26-30 days. She denies dysmenorrhea. She reports no premenstrual symptoms. Contraception:    The current method of family planning is abstinence. Currently not sexually active    Sexual history:    She  reports previously being sexually active and has had partner(s) who are Male. She reports using the following method of birth control/protection: None. Medical conditions:    Since her last annual GYN exam about one year ago, she has not the following changes in her health history: none. Pap and Mammogram History:    Her most recent Pap smear was normal, obtained 1 year(s) ago. The patient had a recent mammogram 2019 which was negative for malignancy. Breast Cancer History/Substance Abuse: negative    Osteoporosis History:    Family history does not include a first or second degree relative with osteopenia or osteoporosis.       Past Medical History:   Diagnosis Date    Anemia     Basal cell carcinoma 1243    Complication of anesthesia     decreased HR    Infertility, female     6 rounds of IUI - not for infertility, but no partner     Past Surgical History:   Procedure Laterality Date    HX KNEE ARTHROSCOPY Right 2000    HX KNEE ARTHROSCOPY Right 2000    HX MALIGNANT SKIN LESION EXCISION  2019    HX MALIGNANT SKIN LESION EXCISION  10/2019    HX POLYPECTOMY  2016    uterine    HX SKIN BIOPSY      Basal cell carcinoma removed from face, nose, L and R arm and abdomen    HX WISDOM TEETH EXTRACTION         Current Outpatient Medications   Medication Sig Dispense Refill    fluconazole (DIFLUCAN) 150 mg tablet Take one tab po and then 2nd tab if symptoms not improved. FDA advises cautious prescribing of oral fluconazole in pregnancy. 2 Tab 0     Allergies: Amoxicillin and Anesthetic [benzocaine-aloe vera]     Tobacco History:  reports that she has never smoked. She has never used smokeless tobacco.  Alcohol Abuse:  reports no history of alcohol use. Drug Abuse:  reports no history of drug use.     Family Medical/Cancer History:   Family History   Problem Relation Age of Onset    Cancer Mother         soft tissue sarcoma    Diabetes Mother     Hypertension Mother     Cancer Father         prostate & skin    Bleeding Prob Brother         blood clot after surgery    Breast Cancer Paternal Grandmother         postmen    Breast Cancer Maternal Aunt         postmen        Review of Systems - History obtained from the patient  Constitutional: negative for weight loss, fever, night sweats  HEENT: negative for hearing loss, earache, congestion, snoring, sorethroat  CV: negative for chest pain, palpitations, edema  Resp: negative for cough, shortness of breath, wheezing  GI: negative for change in bowel habits, abdominal pain, black or bloody stools  : negative for frequency, dysuria, hematuria, vaginal discharge  MSK: negative for back pain, joint pain, muscle pain  Breast: negative for breast lumps, nipple discharge, galactorrhea  Skin :negative for itching, rash, hives  Neuro: negative for dizziness, headache, confusion, weakness  Psych: negative for anxiety, depression, change in mood  Heme/lymph: negative for bleeding, bruising, pallor    Physical Exam    Visit Vitals  /78 (BP 1 Location: Left arm, BP Patient Position: Sitting)   Ht 6' (1.829 m)   Wt 194 lb 8 oz (88.2 kg)   LMP 05/29/2020   BMI 26.38 kg/m²       Constitutional  · Appearance: well-nourished, well developed, alert, in no acute distress    HENT  · Head and Face: appears normal    Chest  · Respiratory Effort: breathing unlabored      Breasts  · Inspection of Breasts: breasts symmetrical, no skin changes, no discharge present, nipple appearance normal, no skin retraction present  · Palpation of Breasts and Axillae: no masses present on palpation, no breast tenderness  · Axillary Lymph Nodes: no lymphadenopathy present    Gastrointestinal  · Abdominal Examination: abdomen non-tender to palpation, normal bowel sounds, no masses present  · Liver and spleen: no hepatomegaly present, spleen not palpable  · Hernias: no hernias identified    Skin  · General Inspection: no rash, no lesions identified    Neurologic/Psychiatric  · Mental Status:  · Orientation: grossly oriented to person, place and time  · Mood and Affect: mood normal, affect appropriate    Genitourinary  · External Genitalia: normal appearance for age, no discharge present, no tenderness present, no inflammatory lesions present, no masses present, no atrophy present  · Vagina: normal vaginal vault without central or paravaginal defects, no discharge present, no inflammatory lesions present, no masses present  · Bladder: non-tender to palpation  · Urethra: appears normal  · Cervix: normal   · Uterus: normal size, shape and consistency  · Adnexa: no adnexal tenderness present, no adnexal masses present  · Perineum: perineum within normal limits, no evidence of trauma, no rashes or skin lesions present  · Anus: anus within normal limits, no hemorrhoids present  · Inguinal Lymph Nodes: no lymphadenopathy present    Assessment:  Routine gynecologic examination  Her current medical status is satisfactory with no evidence of significant gynecologic issues. Plan:  Pap  today  Patient declines presence of chaperone during today's visit.    Counseled re: diet, exercise, healthy lifestyle  Return for yearly wellness visits  Rec annual mammogram

## 2020-06-24 LAB
CYTOLOGIST CVX/VAG CYTO: ABNORMAL
CYTOLOGY CVX/VAG DOC CYTO: ABNORMAL
CYTOLOGY CVX/VAG DOC THIN PREP: ABNORMAL
DX ICD CODE: ABNORMAL
DX ICD CODE: ABNORMAL
HPV I/H RISK 1 DNA CVX QL PROBE+SIG AMP: NEGATIVE
LABCORP, 190119: ABNORMAL
Lab: ABNORMAL
OTHER STN SPEC: ABNORMAL
PATHOLOGIST CVX/VAG CYTO: ABNORMAL
STAT OF ADQ CVX/VAG CYTO-IMP: ABNORMAL

## 2020-06-25 NOTE — PROGRESS NOTES
I've reviewed your results and all looks fine. Additional testing was done on your pap smear showing you have NO evidence of the HPV virus which can cause cervical precancer so that pap can be read as normal and routine repeat testing done next year.

## 2020-06-26 ENCOUNTER — TELEPHONE (OUTPATIENT)
Dept: OBGYN CLINIC | Age: 45
End: 2020-06-26

## 2020-06-26 NOTE — TELEPHONE ENCOUNTER
Message left at 9:55am from PCP office regarding records for the patient for her recent pap smear and mammogram    This nurse called back and advised that they have the records already.

## 2020-08-14 NOTE — ANCILLARY DISCHARGE INSTRUCTIONS
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  
Medications: Verified on Patient Summary List 
 
Start of Care: 3/3/20      Onset Date:2.5 years ago Visits from Start of Care: 2     Missed Visits: 0 Reporting Period : 3/3/20 to 3/10/20 Short Term Goals: To be accomplished in 2 weeks:  
            7. Patient will be I in HEP to promote self management of symptoms. NOT MET 
            1. Patient will report pain level at worst as less than or equal to 8/10 so they can be performed without pain. NOT MET Long Term Goals: To be accomplished in 4-6 weeks:  
            4.  Patient will report pain level at worst as less than or equal to 3/10 so they can be performed without pain.  
            2. Patient will be able to perform 10 push ups without pain.  
            3. Patient will be able to run > or = 5 miles without pain.  
            4. Patient will be able to lift her sons without neck or back pain. 
  
 
 
ASSESSMENT/SUMMARY OF CARE: Patient was seen x 2 visits. Treatment discontinued secondary to COVID 19 pandemic restrictions. RECOMMENDATIONS: 
[x]Discontinue therapy: []Patient has reached or is progressing toward set goals [x] COVID 19  
    []Due to lack of appreciable progress towards set goals Karen Dahl, PT 2020

## 2020-10-01 ENCOUNTER — TRANSCRIBE ORDER (OUTPATIENT)
Dept: MAMMOGRAPHY | Age: 45
End: 2020-10-01

## 2020-10-01 ENCOUNTER — HOSPITAL ENCOUNTER (OUTPATIENT)
Dept: MAMMOGRAPHY | Age: 45
Discharge: HOME | End: 2020-10-01
Attending: OBSTETRICS & GYNECOLOGY
Payer: COMMERCIAL

## 2020-10-01 DIAGNOSIS — Z12.31 VISIT FOR SCREENING MAMMOGRAM: ICD-10-CM

## 2020-10-01 DIAGNOSIS — Z12.31 VISIT FOR SCREENING MAMMOGRAM: Primary | ICD-10-CM

## 2020-10-01 PROCEDURE — 77063 BREAST TOMOSYNTHESIS BI: CPT

## 2020-11-04 ENCOUNTER — TRANSCRIBE ORDER (OUTPATIENT)
Dept: SCHEDULING | Age: 45
End: 2020-11-04

## 2020-11-04 DIAGNOSIS — I51.4 MYOCARDITIS (HCC): Primary | ICD-10-CM

## 2020-11-30 ENCOUNTER — OFFICE VISIT (OUTPATIENT)
Dept: NEUROLOGY | Age: 45
End: 2020-11-30
Payer: COMMERCIAL

## 2020-11-30 VITALS
HEART RATE: 68 BPM | RESPIRATION RATE: 18 BRPM | SYSTOLIC BLOOD PRESSURE: 100 MMHG | OXYGEN SATURATION: 98 % | DIASTOLIC BLOOD PRESSURE: 74 MMHG | WEIGHT: 195 LBS | BODY MASS INDEX: 26.45 KG/M2

## 2020-11-30 DIAGNOSIS — G44.219 EPISODIC TENSION-TYPE HEADACHE, NOT INTRACTABLE: Primary | ICD-10-CM

## 2020-11-30 DIAGNOSIS — Z86.16 HISTORY OF 2019 NOVEL CORONAVIRUS DISEASE (COVID-19): ICD-10-CM

## 2020-11-30 DIAGNOSIS — G44.89 OTHER HEADACHE SYNDROME: ICD-10-CM

## 2020-11-30 PROCEDURE — 99244 OFF/OP CNSLTJ NEW/EST MOD 40: CPT | Performed by: PSYCHIATRY & NEUROLOGY

## 2020-11-30 RX ORDER — OMEPRAZOLE 40 MG/1
40 CAPSULE, DELAYED RELEASE ORAL DAILY
COMMUNITY
End: 2021-01-14

## 2020-11-30 RX ORDER — LANOLIN ALCOHOL/MO/W.PET/CERES
400 CREAM (GRAM) TOPICAL DAILY
Qty: 90 TAB | Refills: 0 | Status: SHIPPED | OUTPATIENT
Start: 2020-11-30 | End: 2021-01-14

## 2020-11-30 RX ORDER — ASCORBIC ACID 500 MG
1000 TABLET ORAL
COMMUNITY
End: 2021-08-04

## 2020-11-30 NOTE — PATIENT INSTRUCTIONS
PRESCRIPTION REFILL POLICY [facility] Statement to Patients April 1, 2014 In an effort to ensure the large volume of patient prescription refills is processed in the most efficient and expeditious manner, we are asking our patients to assist us by calling your Pharmacy for all prescription refills, this will include also your  Mail Order Pharmacy. The pharmacy will contact our office electronically to continue the refill process. Please do not wait until the last minute to call your pharmacy. We need at least 48 hours (2days) to fill prescriptions. We also encourage you to call your pharmacy before going to  your prescription to make sure it is ready. With regard to controlled substance prescription refill requests (narcotic refills) that need to be picked up at our office, we ask your cooperation by providing us with at least 72 hours (3days) notice that you will need a refill. We will not refill narcotic prescription refill requests after 4:00pm on any weekday, Monday through Thursday, or after 2:00pm on Fridays, or on the weekends. We encourage everyone to explore another way of getting your prescription refill request processed using Tellme, our patient web portal through our electronic medical record system. Tellme is an efficient and effective way to communicate your medication request directly to the office and  downloadable as an rossi on your smart phone . Tellme also features a review functionality that allows you to view your medication list as well as leave messages for your physician. Are you ready to get connected? If so please review the attatched instructions or speak to any of our staff to get you set up right away! Thank you so much for your cooperation. Should you have any questions please contact our Practice Administrator. The Physicians and Staff,  [facility]

## 2020-11-30 NOTE — LETTER
11/30/20 Patient: Kaylynn Orantes YOB: 1975 Date of Visit: 11/30/2020 Conor Henao MD 
[address] [place] [address] 7 [id number] VIA Facsimile: [phone] Dear Conor Henao MD, Thank you for referring Ms. Kaylynn Orantes to [address] [place] [address] for evaluation. My notes for this consultation are attached. If you have questions, please do not hesitate to call me. I look forward to following your patient along with you.  
 
 
Sincerely, 
 
Barbie Valdes, DO

## 2020-11-30 NOTE — PROGRESS NOTES
NEUROLOGY  NEW PATIENT EVALUATION/CONSULTATION       PATIENT NAME: Melissa Perez    MRN: [id number]    REASON FOR CONSULTATION: Headaches    11/30/20      Previous records (physician notes, laboratory reports, and radiology reports) and imaging studies were reviewed and summarized. My recommendations will be communicated back to the patient's physician(s) via electronic medical record and/or by [address] [facility] [facility] mail. HISTORY OF PRESENT ILLNESS:  Melissa Perez is a 39 y.o.  female presenting for evaluation of headaches. Patient reports onset of headache 2-3/2020. Headaches were associated with nausea lasting up to 24h occurring every 2 weeks on average. She was diagnosed with COVID July 2020 with worsening of headaches. Location: Bi-temporal, R alton-orbital  Character: Pulsation  Intensity: On average 3-7/10  Frequency: 1-2x weekly  # HA free days per month: 20  Duration: 24h  Aura: None  Associated Sx with HA: no nausea/vomiting, no phonophotophobia. Neurological ROS: Denies focal weakness, numbness or vision loss associated with headaches  Systemic ROS:   No h/o snoring  Caffeine use: None  H/O Head trauma: None  Depressive or anxiety Sx: None    Any change in pattern of HA? See above    Triggers: None. No worsening reported with cough/sneeze, bending over  Alleviating factors: N/A  FHx HA/migraine: None    Treatment so far:  Advil-effective     Investigations so far: None      PAST MEDICAL HISTORY:  Past Medical History:   Diagnosis Date    Anemia     Basal cell carcinoma 8813    Complication of anesthesia     decreased HR    Infertility, female     6 rounds of IUI - not for infertility, but no partner       PAST SURGICAL HISTORY:  Past Surgical History:   Procedure Laterality Date    HX KNEE ARTHROSCOPY Right 07/2000    HX KNEE ARTHROSCOPY Right 07/2000    HX MALIGNANT SKIN LESION EXCISION  12/2019    HX MALIGNANT SKIN LESION EXCISION  10/2019    HX POLYPECTOMY  01/2016    uterine    HX SKIN BIOPSY  2012    Basal cell carcinoma removed from face, nose, L and R arm and abdomen    HX WISDOM TEETH EXTRACTION         FAMILY HISTORY:   Family History   Problem Relation Age of Onset    Cancer Mother         soft tissue sarcoma    Diabetes Mother     Hypertension Mother     Cancer Father         prostate & skin    Bleeding Prob Brother         blood clot after surgery    Breast Cancer Paternal Grandmother         postmen    Breast Cancer Maternal Aunt         postmen         SOCIAL HISTORY:  Social History     Socioeconomic History    Marital status: SINGLE     Spouse name: Not on file    Number of children: Not on file    Years of education: Not on file    Highest education level: Not on file   Tobacco Use    Smoking status: Never Smoker    Smokeless tobacco: Never Used   Substance and Sexual Activity    Alcohol use: No    Drug use: No    Sexual activity: Not Currently     Partners: Male     Birth control/protection: None         MEDICATIONS:   Current Outpatient Medications   Medication Sig Dispense Refill    omeprazole (PRILOSEC) 40 mg capsule Take 40 mg by mouth daily.  ascorbic acid, vitamin C, (Vitamin C) 500 mg tablet Take 1,000 mg by mouth.  zinc 50 mg tab tablet Take  by mouth daily. ALLERGIES:  Allergies   Allergen Reactions    Amoxicillin Other (comments)     Migraines    Anesthetic [Benzocaine-Aloe Vera] Other (comments)     ANESTHESIA results in decreased heart rate (in the 30s)  Has happened twice         REVIEW OF SYSTEMS:  10 point ROS reviewed with patient. Please see scanned document under media. PHYSICAL EXAM:  Vital Signs:   Visit Vitals  /74   Pulse 68   Resp 18   Wt 195 lb (88.5 kg)   SpO2 98%   BMI 26.45 kg/m²        General Medical Exam:  General:  Well appearing, comfortable, in no apparent distress. Eyes/ENT: see cranial nerve examination. Neck: No masses appreciated. Full range of motion without tenderness.   Respiratory:  Clear to auscultation, good air entry bilaterally. Cardiac:  Regular rate and rhythm, no murmur. GI:  Soft, non-tender, non-distended abdomen. Bowel sounds normal. No masses, organomegaly. Extremities:  No deformities, edema, or skin discoloration. Skin:  No rashes or lesions. Neurological:  · Mental Status:  Alert and oriented to person, place, and time with fluent speech. · Cranial Nerves:   CNII/III/IV/VI: Optic disc w/clear margins OD, slightly obscured margins OS, visual fields full to confrontation, EOMI, PERRL, no ptosis or nystagmus. CN V: Facial sensation intact bilaterally, masseter 5/5   CN VII: Facial muscles symmetric and strong   CN VIII: Hears finger rub well bilaterally, intact vestibular function   CN IX/X: Normal palatal movement   CN XI: Full strength shoulder shrug bilaterally   CN XII: Tongue protrusion full and midline without fasciculation or atrophy  · Motor: Normal tone and muscle bulk with no pronator drift. No atrophy or fasciculations present on examination. Individual muscle group testing:  Shoulder abduction:   Left:5/5   Right : 5/5    Shoulder adduction:   Left:5/5   Right : 5/5    Elbow flexion:      Left:5/5   Right : 5/5  Elbow extension:    Left:5/5   Right : 5/5   Wrist flexion:    Left:5/5   Right : 5/5  Wrist extension:    Left:5/5   Right : 5/5  Arm pronation:   Left:5/5   Right : 5/5  Arm supination:   Left:5/5   Right : 5/5    Finger flexion:    Left:5/5   Right : 5/5    Finger extension:   Left:5/5   Right : 5/5   Finger abduction:  Left:5/5   Right : 5/5   Finger adduction:   Left:5/5   Right : 5/5  Hip flexion:     Left:5/5   Right : 5/5         Hip extension:   Left:5/5   Right : 5/5    Knee flexion:    Left:5/5   Right : 5/5    Knee extension:   Left:5/5   Right : 5/5    Dorsiflexion:     Left:5/5   Right : 5/5  Plantar flexion:    Left:5/5   Right : 5/5      · MSRs: No crossed adductors or clonus.          RIGHT  LEFT   Brachioradialis 2+ 2+   Biceps 2+ 2+ Triceps 2+ 2+   Knee 2+ 2+   Achilles 2+ 2+        Plantar response Downward Downward          · Sensation: Normal and symmetric perception of pinprick, temperature, light touch, proprioception, and vibration; (-) Romberg. · Coordination: No dysmetria. Normal rapid alternating movements; finger-to-nose and heel-to- shin testing are within normal limits. · Gait: Normal native gait    PERTINENT DATA:  OUTSIDE RECORDS:  The patient provided outside medical records which were reviewed during the course of the visit. The relevant detail are summarized above. ASSESSMENT:      ICD-10-CM ICD-9-CM    1. Episodic tension-type headache, not intractable  G44.219 339.11 MRI BRAIN W WO CONT      MRI BRAIN MRV WO CONT   2. Other headache syndrome  G44.89 339.89 MRI BRAIN W WO CONT      MRI BRAIN MRV WO CONT   3. History of 2019 novel coronavirus disease (COVID-19)  Z86.19 V12.09 MRI BRAIN W WO CONT      MRI BRAIN MRV WO CONT   39year old pleasant female presenting with episodic headaches since 2/2020 with interval worsening frequency post COVID 7/2020. Neurological examination is non-focal today. Given change in headache character and potential for hypercoagulability associated with COVID, will proceed with neuroimaging as outlined to exclude any contributing structural pathology or venous sinus thrombosis. Will initiate magnesium supplementation for headache prophylaxis. Headache education  Discussed pathophysiology of headache. Discussed use of headache diary. Discussed treatment options, both abortive and preventive medications. Instructed patient about medications and potential side effects. Discussed natural supplements including Mg    PLAN:  · MRI Brain/MRV  · Start Mg oxide 400mg daily for headache prophylaxis       Follow-up and Dispositions    · Return in about 6 weeks (around 1/11/2021). Gogo Braga DO  Staff Neurologist  [employer], [address] [place] [address] Board of Psychiatry & Neurology CC Referring provider:    Aleta Sagastume MD

## 2020-12-09 ENCOUNTER — HOSPITAL ENCOUNTER (OUTPATIENT)
Dept: MRI IMAGING | Age: 45
Discharge: HOME | End: 2020-12-09
Attending: PSYCHIATRY & NEUROLOGY
Payer: COMMERCIAL

## 2020-12-09 DIAGNOSIS — G44.219 EPISODIC TENSION-TYPE HEADACHE, NOT INTRACTABLE: ICD-10-CM

## 2020-12-09 DIAGNOSIS — G44.89 OTHER HEADACHE SYNDROME: ICD-10-CM

## 2020-12-09 DIAGNOSIS — Z86.16 HISTORY OF 2019 NOVEL CORONAVIRUS DISEASE (COVID-19): ICD-10-CM

## 2020-12-09 PROCEDURE — 70544 MR ANGIOGRAPHY HEAD W/O DYE: CPT

## 2020-12-09 PROCEDURE — 74011250636 HC RX REV CODE- 250/636: Performed by: PSYCHIATRY & NEUROLOGY

## 2020-12-09 PROCEDURE — A9575 INJ GADOTERATE MEGLUMI 0.1ML: HCPCS | Performed by: PSYCHIATRY & NEUROLOGY

## 2020-12-09 PROCEDURE — 70551 MRI BRAIN STEM W/O DYE: CPT

## 2020-12-09 PROCEDURE — 70553 MRI BRAIN STEM W/O & W/DYE: CPT

## 2020-12-09 RX ORDER — GADOTERATE MEGLUMINE 376.9 MG/ML
15 INJECTION INTRAVENOUS
Status: COMPLETED | OUTPATIENT
Start: 2020-12-09 | End: 2020-12-09

## 2020-12-09 RX ADMIN — GADOTERATE MEGLUMINE 15 ML: 376.9 INJECTION INTRAVENOUS at 17:41

## 2020-12-16 ENCOUNTER — HOSPITAL ENCOUNTER (OUTPATIENT)
Dept: MRI IMAGING | Age: 45
Discharge: HOME | End: 2020-12-16
Attending: INTERNAL MEDICINE
Payer: COMMERCIAL

## 2020-12-16 VITALS — WEIGHT: 185 LBS | BODY MASS INDEX: 25.09 KG/M2

## 2020-12-16 DIAGNOSIS — I51.4 MYOCARDITIS (HCC): ICD-10-CM

## 2020-12-16 PROCEDURE — 77030021566

## 2020-12-16 PROCEDURE — 74011636320 HC RX REV CODE- 636/320: Performed by: INTERNAL MEDICINE

## 2020-12-16 PROCEDURE — A9576 INJ PROHANCE MULTIPACK: HCPCS | Performed by: INTERNAL MEDICINE

## 2020-12-16 PROCEDURE — 75561 CARDIAC MRI FOR MORPH W/DYE: CPT

## 2020-12-16 RX ADMIN — GADOTERIDOL 25 ML: 279.3 INJECTION, SOLUTION INTRAVENOUS at 14:30

## 2020-12-18 PROCEDURE — 77030021566

## 2021-01-14 ENCOUNTER — OFFICE VISIT (OUTPATIENT)
Dept: NEUROLOGY | Age: 46
End: 2021-01-14
Payer: COMMERCIAL

## 2021-01-14 VITALS
SYSTOLIC BLOOD PRESSURE: 112 MMHG | DIASTOLIC BLOOD PRESSURE: 78 MMHG | OXYGEN SATURATION: 99 % | RESPIRATION RATE: 18 BRPM | HEART RATE: 71 BPM

## 2021-01-14 DIAGNOSIS — G44.219 EPISODIC TENSION-TYPE HEADACHE, NOT INTRACTABLE: Primary | ICD-10-CM

## 2021-01-14 DIAGNOSIS — G44.89 OTHER HEADACHE SYNDROME: ICD-10-CM

## 2021-01-14 DIAGNOSIS — Z86.16 HISTORY OF 2019 NOVEL CORONAVIRUS DISEASE (COVID-19): ICD-10-CM

## 2021-01-14 PROCEDURE — 99213 OFFICE O/P EST LOW 20 MIN: CPT | Performed by: PSYCHIATRY & NEUROLOGY

## 2021-01-14 NOTE — LETTER
1/14/2021 Patient: Hui Diaz YOB: 1975 Date of Visit: 1/14/2021 Ambika Rebollar MD 
[address] [place] [phone] Via Fax: [phone] Dear Ambika Rebollar MD, Thank you for referring Ms. Hui Diaz to [address] [place] [address] for evaluation. My notes for this consultation are attached. If you have questions, please do not hesitate to call me. I look forward to following your patient along with you.  
 
 
Sincerely, 
 
Meg Waters, DO

## 2021-01-14 NOTE — PROGRESS NOTES
Neurology Clinic Follow up Note    Patient ID:  Sendy Lal  [id number]  50 y.o.  1975      Ms. Neelam Holguin is here for follow up today of  Chief Complaint   Patient presents with    Headache          Last Appointment With Me:  11/30/2020     \"45 y.o.  female presenting for evaluation of headaches. Patient reports onset of headache 2-3/2020. Headaches were associated with nausea lasting up to 24h occurring every 2 weeks on average. She was diagnosed with COVID July 2020 with worsening of headaches. Location: Bi-temporal, R alton-orbital  Character: Pulsation  Intensity: On average 3-7/10  Frequency: 1-2x weekly  # HA free days per month: 20  Duration: 24h  Aura: None  Associated Sx with HA: no nausea/vomiting, no phonophotophobia. Neurological ROS: Denies focal weakness, numbness or vision loss associated with headaches  Systemic ROS:   No h/o snoring  Caffeine use: None  H/O Head trauma: None  Depressive or anxiety Sx: None    Any change in pattern of HA? See above    Triggers: None. No worsening reported with cough/sneeze, bending over  Alleviating factors: N/A  FHx HA/migraine: None\"    Interval History:   Headaches were doing very well over the past month until this past week. She noted a headache over the R hemisphere with throbbing, no associated nausea/vomiting or photophobia lasting 72h. She also received her second COVID vaccine during this time period. She denies current headache. She discontinued Mg supplementation after 2 weeks due to side effects/diarrhea. Of note, she reports a recent panic attack last week. PCP is starting a preventative for anxiety to address this. PMHx/ PSHx/ FHx/ SHx:  Reviewed and unchanged previous visit.    Past Medical History:   Diagnosis Date    Anemia     Basal cell carcinoma 4181    Complication of anesthesia     decreased HR    Infertility, female     6 rounds of IUI - not for infertility, but no partner         ROS:  Comprehensive review of systems negative except for as noted above. Objective:       Meds:  Current Outpatient Medications   Medication Sig Dispense Refill    ascorbic acid, vitamin C, (Vitamin C) 500 mg tablet Take 1,000 mg by mouth.  zinc 50 mg tab tablet Take  by mouth daily.  ibuprofen (AdviL) 100 mg tablet Take 100 mg by mouth every six (6) hours as needed for Pain. Exam:  Visit Vitals  /78   Pulse 71   Resp 18   SpO2 99%     NEUROLOGICAL EXAM:  General: Awake, alert, speech fluent  CN: PERRL, EOMI without nystagmus, VFF to confrontation, facial sensation and strength are normal and symmetric, hearing is intact to finger rub bilaterally, palate and tongue movements are intact and symmetric. Motor: Normal tone, bulk and strength bilaterally. Reflexes: 2/4 and symmetric, plantar stimulation is flexor. Coordination: FNF, MANDIE, HTS intact. Sensation: LT intact throughout. Gait: Normal-based and steady. LABS  Results for orders placed or performed in visit on 06/05/20   PAP, IG, RFX HPV ASCUS ([id number])   Result Value Ref Range    Diagnosis Comment (A)     Specimen adequacy Comment     Clinician provided ICD10 Comment     Performed by: Comment     Electronically signed by: Comment     . Joshua Argueta Pathologist provided ICD10 Comment     Note: Comment     Test methodology Comment     . Comment    HPV HIGH RISK, THIN PREP   Result Value Ref Range    HPV DNA Probe, High Risk Negative Negative       IMAGING:  MRI Results (maximum last 3): Results from [place] encounter on 12/09/20   MRI BRAIN W WO CONT    Narrative EXAM:  MRI BRAIN W WO CONT, MRI BRAIN MRV WO CONT    INDICATION:    worsening headaches, change in headache character post COVID    COMPARISON:  None. CONTRAST: 15 ml Dotarem. TECHNIQUE:    Multiplanar multisequence acquisition without and with contrast of the brain. Time-of-flight noncontrast MRV of the brain was performed.  Multiplanar and MIP  reconstructions were obtained. FINDINGS:  MRI brain:  The ventricles are normal in size and position. Minimal (less than 5) tiny  nonspecific T2/FLAIR white matter hyperintensities, of doubtful clinical  significance. There is no acute infarct, hemorrhage, extra-axial fluid  collection, or mass effect. There is no cerebellar tonsillar herniation. Expected arterial flow-voids are present. No evidence of abnormal enhancement. The paranasal sinuses are clear. Trace bilateral mastoid effusions. The orbital  contents are within normal limits. No significant osseous or scalp lesions are  identified. MRV head: The dural venous sinuses and deep cerebral veins are patent without evidence of  thrombosis. Right dominant transverse sinus, with no significant stenosis of the  transverse sinuses. Impression IMPRESSION:   MRI brain:  1. No acute or significant intracranial abnormality. MRV head:  1. No evidence of venous sinus thrombosis. MRV BRAIN WO CONT    Narrative EXAM:  MRI BRAIN W WO CONT, MRI BRAIN MRV WO CONT    INDICATION:    worsening headaches, change in headache character post COVID    COMPARISON:  None. CONTRAST: 15 ml Dotarem. TECHNIQUE:    Multiplanar multisequence acquisition without and with contrast of the brain. Time-of-flight noncontrast MRV of the brain was performed. Multiplanar and MIP  reconstructions were obtained. FINDINGS:  MRI brain:  The ventricles are normal in size and position. Minimal (less than 5) tiny  nonspecific T2/FLAIR white matter hyperintensities, of doubtful clinical  significance. There is no acute infarct, hemorrhage, extra-axial fluid  collection, or mass effect. There is no cerebellar tonsillar herniation. Expected arterial flow-voids are present. No evidence of abnormal enhancement. The paranasal sinuses are clear. Trace bilateral mastoid effusions. The orbital  contents are within normal limits. No significant osseous or scalp lesions are  identified.     MRV head:  The dural venous sinuses and deep cerebral veins are patent without evidence of  thrombosis. Right dominant transverse sinus, with no significant stenosis of the  transverse sinuses. Impression IMPRESSION:   MRI brain:  1. No acute or significant intracranial abnormality. MRV head:  1. No evidence of venous sinus thrombosis. Assessment/Plan:     Encounter Diagnoses     ICD-10-CM ICD-9-CM   1. Episodic tension-type headache, not intractable  G44.219 339.11   2. Other headache syndrome  G44.89 339.89   3. History of 2019 novel coronavirus disease (COVID-19)  Z86.16      39year old pleasant female here for f/u of episodic non-migrainous headaches since 2/2020 with interval worsening frequency post COVID 7/2020. Neurological examination is non-focal.  Headache frequency has overall improved since her initial visit. MRI Brain/MRV were independently reviewed today showing no evidence of acute structural pathology, mass or venous sinus thrombosis. Minimal T2 hyperintensities noted are non-specific and possible related to headache given lack of vascular risk factors. She appears to be doing well currently apart from some recent anxiety/panic disorder being managed by her PCP. Advise PRN follow up for any new/worsening neurologic symptoms. Follow-up and Dispositions    · Return if symptoms worsen or fail to improve. I have discussed the diagnosis with the patient today and the intended plan as seen in the above orders with both the patient as well as referring provider and/or PCP via electronic correspondence. The patient has received an after-visit summary and questions were answered concerning future plans.     Signed:  Erica Koch DO  1/14/2021  9:50 AM

## 2021-01-14 NOTE — PATIENT INSTRUCTIONS
PRESCRIPTION REFILL POLICY [address] [place] [address] Statement to Patients April 1, 2014 In an effort to ensure the large volume of patient prescription refills is processed in the most efficient and expeditious manner, we are asking our patients to assist us by calling your Pharmacy for all prescription refills, this will include also your  Mail Order Pharmacy. The pharmacy will contact our office electronically to continue the refill process. Please do not wait until the last minute to call your pharmacy. We need at least 48 hours (2days) to fill prescriptions. We also encourage you to call your pharmacy before going to  your prescription to make sure it is ready. With regard to controlled substance prescription refill requests (narcotic refills) that need to be picked up at our office, we ask your cooperation by providing us with at least 72 hours (3days) notice that you will need a refill. We will not refill narcotic prescription refill requests after 4:00pm on any weekday, Monday through Thursday, or after 2:00pm on Fridays, or on the weekends. We encourage everyone to explore another way of getting your prescription refill request processed using [employer], our patient web portal through our electronic medical record system. [employer] is an efficient and effective way to communicate your medication request directly to the office and  downloadable as an rossi on your smart phone . [employer] also features a review functionality that allows you to view your medication list as well as leave messages for your physician. Are you ready to get connected? If so please review the attatched instructions or speak to any of our staff to get you set up right away! Thank you so much for your cooperation. Should you have any questions please contact our Practice Administrator. The Physicians and Staff,  [address] [place] [address]

## 2021-03-12 ENCOUNTER — TELEPHONE (OUTPATIENT)
Dept: SURGERY | Age: 46
End: 2021-03-12

## 2021-03-29 ENCOUNTER — OFFICE VISIT (OUTPATIENT)
Dept: SURGERY | Age: 46
End: 2021-03-29
Payer: COMMERCIAL

## 2021-03-29 VITALS
HEIGHT: 72 IN | SYSTOLIC BLOOD PRESSURE: 117 MMHG | HEART RATE: 78 BPM | WEIGHT: 191.8 LBS | DIASTOLIC BLOOD PRESSURE: 76 MMHG | OXYGEN SATURATION: 98 % | TEMPERATURE: 99.2 F | BODY MASS INDEX: 25.98 KG/M2 | RESPIRATION RATE: 18 BRPM

## 2021-03-29 DIAGNOSIS — K43.9 VENTRAL HERNIA WITHOUT OBSTRUCTION OR GANGRENE: Primary | ICD-10-CM

## 2021-03-29 PROCEDURE — 99202 OFFICE O/P NEW SF 15 MIN: CPT | Performed by: SURGERY

## 2021-03-29 RX ORDER — SERTRALINE HYDROCHLORIDE 50 MG/1
TABLET, FILM COATED ORAL DAILY
COMMUNITY

## 2021-03-29 NOTE — LETTER
3/30/2021 Patient: Kenzie Pino YOB: 1975 Date of Visit: 3/29/2021 Justo Kimball MD 
[address] [facility] [address] [place] [address] Via Fax: [phone] Kerline Patterson MD 
[address] [facility] [address] [facility] [address] [address] Via Fax: [phone] Dear MD Zoltan Vela MD, Thank you for referring Ms. Kenzie Pino to [facility] for evaluation. My notes for this consultation are attached. If you have questions, please do not hesitate to call me. I look forward to following your patient along with you. Sincerely, Emma Ellsworth MD

## 2021-03-29 NOTE — PROGRESS NOTES
1. Have you been to the ER, urgent care clinic since your last visit? Hospitalized since your last visit? No    2. Have you seen or consulted any other health care providers outside of the [address] [place] [address] since your last visit? Include any pap smears or colon screening.  No

## 2021-03-30 NOTE — PROGRESS NOTES
[facility] at [facility] Surgery History and Physical    History of Present Illness:      Kiersten Rod is a 39 y.o. female who has a new ventral hernia. The hernia has been present a year or so but has been getting bigger. SHe does have some mild pain from hernia. The pain is a 1-2 of 10. She does not have any other GI related symptoms. Past Medical History:   Diagnosis Date    Anemia     Arthritis     Rt  Knee    Basal cell carcinoma 6515    Complication of anesthesia     decreased HR    Headache     had covid July 2020    Infertility, female     6 rounds of IUI - not for infertility, but no partner       Past Surgical History:   Procedure Laterality Date    HX KNEE ARTHROSCOPY Right 07/2000    HX KNEE ARTHROSCOPY Right 07/2000    HX MALIGNANT SKIN LESION EXCISION  12/2019    HX MALIGNANT SKIN LESION EXCISION  10/2019    HX POLYPECTOMY  01/2016    uterine    HX SKIN BIOPSY  2012    Basal cell carcinoma removed from face, nose, L and R arm and abdomen    HX WISDOM TEETH EXTRACTION           Current Outpatient Medications:     sertraline (ZOLOFT) 50 mg tablet, Take  by mouth daily. , Disp: , Rfl:     ascorbic acid, vitamin C, (Vitamin C) 500 mg tablet, Take 1,000 mg by mouth., Disp: , Rfl:     zinc 50 mg tab tablet, Take  by mouth daily. , Disp: , Rfl:     ibuprofen (AdviL) 100 mg tablet, Take 100 mg by mouth every six (6) hours as needed for Pain., Disp: , Rfl:     Allergies   Allergen Reactions    Amoxicillin Other (comments)     Migraines    Anesthetic [Benzocaine-Aloe Vera] Other (comments)     ANESTHESIA results in decreased heart rate (in the 30s)  Has happened twice       Social History     Socioeconomic History    Marital status: SINGLE     Spouse name: Not on file    Number of children: Not on file    Years of education: Not on file    Highest education level: Not on file   Occupational History    Not on file   Social Needs    Financial resource strain: Not on file    Food insecurity     Worry: Not on file     Inability: Not on file    Transportation needs     Medical: Not on file     Non-medical: Not on file   Tobacco Use    Smoking status: Never Smoker    Smokeless tobacco: Never Used   Substance and Sexual Activity    Alcohol use: No    Drug use: No    Sexual activity: Not Currently     Partners: Male     Birth control/protection: None   Lifestyle    Physical activity     Days per week: Not on file     Minutes per session: Not on file    Stress: Not on file   Relationships    Social connections     Talks on phone: Not on file     Gets together: Not on file     Attends Church service: Not on file     Active member of club or organization: Not on file     Attends meetings of clubs or organizations: Not on file     Relationship status: Not on file    Intimate partner violence     Fear of current or ex partner: Not on file     Emotionally abused: Not on file     Physically abused: Not on file     Forced sexual activity: Not on file   Other Topics Concern    Not on file   Social History Narrative    Not on file       Family History   Problem Relation Age of Onset    Cancer Mother         soft tissue sarcoma    Diabetes Mother     Hypertension Mother     Cancer Father         prostate & skin    Bleeding Prob Brother         blood clot after surgery    Breast Cancer Paternal Grandmother         postmen    Breast Cancer Maternal Aunt         postmen       ROS   Constitutional: negative  Ears, Nose, Mouth, Throat, and Face: negative  Respiratory: negative  Cardiovascular: negative  Gastrointestinal: positive for abdominal pain and ventral hernia  Genitourinary:negative  Integument/Breast: negative  Hematologic/Lymphatic: negative  Behavioral/Psychiatric: negative  Allergic/Immunologic: negative      Physical Exam:     Visit Vitals  /76 (BP 1 Location: Left arm, BP Patient Position: Sitting, BP Cuff Size: Small adult)   Pulse 78   Temp 99.2 °F (37.3 °C) (Oral)   Resp 18   Ht 6' (1.829 m)   Wt 191 lb 12.8 oz (87 kg)   SpO2 98%   BMI 26.01 kg/m²       General - alert and oriented, no apparent distress  HEENT - no jaundice, no hearing imparement  Pulm - CTAB, no C/W/R  CV - RRR, no M/R/G  Abd - soft, ND, BS Present, small ventral just above the umbilicus present, soft and partially reducible, mild TTP, cannot feel the defect but likely about 1cm  Ext - pulses intact in UE and LE bilaterally, no edema  Skin - supple, no rashes  Psychiatric - normal affect, good mood    Labs  No results found for: NA, K, CL, CO2, AGAP, GLU, BUN, CREA, BUCR, GFRAA, GFRNA, CA, TBIL, TBILI, AP, TP, ALB, GLOB, AGRAT, ALT, AST  Lab Results   Component Value Date/Time    WBC 12.3 (H) 07/04/2017 05:57 AM    HGB 8.4 (L) 07/04/2017 05:57 AM    HCT 25.6 (L) 07/04/2017 05:57 AM    PLATELET 515 93/68/5659 05:57 AM    MCV 81.8 07/04/2017 05:57 AM    Hgb, External 13.2 12/08/2016    Hct, External 38.8 12/08/2016    Platelet cnt., External 257 12/08/2016         Imaging  none  I have reviewed and agree with all of the pertinent images    Assessment:     He Garvey is a 39 y.o. female with ventral hernia     Recommendations:     1. She does have a small ventral hernia about 5cm above the umbilicus but distinctly separate. She will need open ventral hernia repair in the OR. The defect is likely about 1cm in size and unlikely to need mesh. I have discussed the above procedure with the patient in detail. We reviewed the benefits and possible complications of the surgery which include bleeding, infection, damage to adjacent organs, venous thromboembolism, need for repeat surgery, death and other unforseen complications. The patient agreed to proceed with the surgery. Melissa Cutler MD    Greater than half of the time: 20 minutes was used in counciling the patient about diagnosis and treatment plan    Ms. Alana Bradshaw has a reminder for a \"due or due soon\" health maintenance.  I have asked that she contact her primary care provider for follow-up on this health maintenance.

## 2021-04-28 ENCOUNTER — TRANSCRIBE ORDER (OUTPATIENT)
Dept: SCHEDULING | Age: 46
End: 2021-04-28

## 2021-04-28 DIAGNOSIS — R11.0 NAUSEA: Primary | ICD-10-CM

## 2021-04-28 DIAGNOSIS — R10.9 ABDOMINAL PAIN: ICD-10-CM

## 2021-05-02 ENCOUNTER — TRANSCRIBE ORDER (OUTPATIENT)
Dept: REGISTRATION | Age: 46
End: 2021-05-02

## 2021-05-02 ENCOUNTER — HOSPITAL ENCOUNTER (OUTPATIENT)
Dept: PREADMISSION TESTING | Age: 46
Discharge: HOME | End: 2021-05-02
Payer: COMMERCIAL

## 2021-05-02 DIAGNOSIS — U07.1 COVID-19: ICD-10-CM

## 2021-05-02 DIAGNOSIS — U07.1 COVID-19: Primary | ICD-10-CM

## 2021-05-02 PROCEDURE — U0003 INFECTIOUS AGENT DETECTION BY NUCLEIC ACID (DNA OR RNA); SEVERE ACUTE RESPIRATORY SYNDROME CORONAVIRUS 2 (SARS-COV-2) (CORONAVIRUS DISEASE [COVID-19]), AMPLIFIED PROBE TECHNIQUE, MAKING USE OF HIGH THROUGHPUT TECHNOLOGIES AS DESCRIBED BY CMS-2020-01-R: HCPCS

## 2021-05-03 LAB — SARS-COV-2, COV2NT: NOT DETECTED

## 2021-05-04 ENCOUNTER — NURSE TRIAGE (OUTPATIENT)
Dept: OTHER | Facility: CLINIC | Age: 46
End: 2021-05-04

## 2021-05-04 NOTE — TELEPHONE ENCOUNTER
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 ONSET: \"When did the pain start? \"       Today 13:35    2. LOCATION: \"Where is the pain located? \"       Left ankle     3. PAIN: \"How bad is the pain? \"    (Scale 1-10; or mild, moderate, severe)   - MILD (1-3): doesn't interfere with normal activities    - MODERATE (4-7): interferes with normal activities (e.g., work or school) or awakens from sleep, limping    - SEVERE (8-10): excruciating pain, unable to do any normal activities, unable to walk       4/10    4. WORK OR EXERCISE: \"Has there been any recent work or exercise that involved this part of the body? \"       Denies     5. CAUSE: \"What do you think is causing the ankle pain? \"      Was at a patients home, the transition from the ramp to the house was not in the right position. Employee stepped on edge and rolled her ankle     6. OTHER SYMPTOMS: \"Do you have any other symptoms? \" (e.g., calf pain, rash, fever, swelling)      Pain under the heel of left foot     7. PREGNANCY: \"Is there any chance you are pregnant? \" \"When was your last menstrual period? \"      Denies    Protocols used: ANKLE PAIN-ADULT-[place]

## 2021-05-06 ENCOUNTER — ANESTHESIA (OUTPATIENT)
Dept: ENDOSCOPY | Age: 46
End: 2021-05-06
Payer: COMMERCIAL

## 2021-05-06 ENCOUNTER — ANESTHESIA EVENT (OUTPATIENT)
Dept: ENDOSCOPY | Age: 46
End: 2021-05-06
Payer: COMMERCIAL

## 2021-05-06 ENCOUNTER — HOSPITAL ENCOUNTER (OUTPATIENT)
Age: 46
Setting detail: OUTPATIENT SURGERY
Discharge: HOME | End: 2021-05-06
Attending: INTERNAL MEDICINE | Admitting: INTERNAL MEDICINE
Payer: COMMERCIAL

## 2021-05-06 VITALS
TEMPERATURE: 98.1 F | HEIGHT: 72 IN | HEART RATE: 56 BPM | BODY MASS INDEX: 25.98 KG/M2 | SYSTOLIC BLOOD PRESSURE: 108 MMHG | OXYGEN SATURATION: 98 % | DIASTOLIC BLOOD PRESSURE: 72 MMHG | RESPIRATION RATE: 16 BRPM | WEIGHT: 191.8 LBS

## 2021-05-06 LAB — HCG UR QL: NEGATIVE

## 2021-05-06 PROCEDURE — 74011250636 HC RX REV CODE- 250/636: Performed by: NURSE ANESTHETIST, CERTIFIED REGISTERED

## 2021-05-06 PROCEDURE — 81025 URINE PREGNANCY TEST: CPT

## 2021-05-06 PROCEDURE — 74011000250 HC RX REV CODE- 250: Performed by: NURSE ANESTHETIST, CERTIFIED REGISTERED

## 2021-05-06 PROCEDURE — 76040000019: Performed by: INTERNAL MEDICINE

## 2021-05-06 PROCEDURE — 88305 TISSUE EXAM BY PATHOLOGIST: CPT

## 2021-05-06 PROCEDURE — 76060000031 HC ANESTHESIA FIRST 0.5 HR: Performed by: INTERNAL MEDICINE

## 2021-05-06 PROCEDURE — 77030021593 HC FCPS BIOP ENDOSC BSC -A: Performed by: INTERNAL MEDICINE

## 2021-05-06 PROCEDURE — 2709999900 HC NON-CHARGEABLE SUPPLY: Performed by: INTERNAL MEDICINE

## 2021-05-06 PROCEDURE — 88342 IMHCHEM/IMCYTCHM 1ST ANTB: CPT

## 2021-05-06 RX ORDER — MIDAZOLAM HYDROCHLORIDE 1 MG/ML
.25-5 INJECTION, SOLUTION INTRAMUSCULAR; INTRAVENOUS
Status: DISCONTINUED | OUTPATIENT
Start: 2021-05-06 | End: 2021-05-06 | Stop reason: HOSPADM

## 2021-05-06 RX ORDER — EPINEPHRINE 0.1 MG/ML
1 INJECTION INTRACARDIAC; INTRAVENOUS
Status: DISCONTINUED | OUTPATIENT
Start: 2021-05-06 | End: 2021-05-06 | Stop reason: HOSPADM

## 2021-05-06 RX ORDER — NALOXONE HYDROCHLORIDE 0.4 MG/ML
0.4 INJECTION, SOLUTION INTRAMUSCULAR; INTRAVENOUS; SUBCUTANEOUS
Status: DISCONTINUED | OUTPATIENT
Start: 2021-05-06 | End: 2021-05-06 | Stop reason: HOSPADM

## 2021-05-06 RX ORDER — DICYCLOMINE HYDROCHLORIDE 10 MG/1
10 CAPSULE ORAL
Qty: 30 CAP | Refills: 3 | Status: SHIPPED | OUTPATIENT
Start: 2021-05-06 | End: 2021-06-05

## 2021-05-06 RX ORDER — SODIUM CHLORIDE 0.9 % (FLUSH) 0.9 %
5-40 SYRINGE (ML) INJECTION AS NEEDED
Status: DISCONTINUED | OUTPATIENT
Start: 2021-05-06 | End: 2021-05-06 | Stop reason: HOSPADM

## 2021-05-06 RX ORDER — ATROPINE SULFATE 0.1 MG/ML
0.5 INJECTION INTRAVENOUS
Status: DISCONTINUED | OUTPATIENT
Start: 2021-05-06 | End: 2021-05-06 | Stop reason: HOSPADM

## 2021-05-06 RX ORDER — SODIUM CHLORIDE 9 MG/ML
INJECTION, SOLUTION INTRAVENOUS
Status: DISCONTINUED | OUTPATIENT
Start: 2021-05-06 | End: 2021-05-06 | Stop reason: HOSPADM

## 2021-05-06 RX ORDER — PANTOPRAZOLE SODIUM 40 MG/1
40 TABLET, DELAYED RELEASE ORAL
Qty: 90 TAB | Refills: 3 | Status: SHIPPED | OUTPATIENT
Start: 2021-05-06 | End: 2021-08-04

## 2021-05-06 RX ORDER — FLUMAZENIL 0.1 MG/ML
0.2 INJECTION INTRAVENOUS
Status: DISCONTINUED | OUTPATIENT
Start: 2021-05-06 | End: 2021-05-06 | Stop reason: HOSPADM

## 2021-05-06 RX ORDER — LIDOCAINE HYDROCHLORIDE 20 MG/ML
INJECTION, SOLUTION EPIDURAL; INFILTRATION; INTRACAUDAL; PERINEURAL AS NEEDED
Status: DISCONTINUED | OUTPATIENT
Start: 2021-05-06 | End: 2021-05-06 | Stop reason: HOSPADM

## 2021-05-06 RX ORDER — SODIUM CHLORIDE 0.9 % (FLUSH) 0.9 %
5-40 SYRINGE (ML) INJECTION EVERY 8 HOURS
Status: DISCONTINUED | OUTPATIENT
Start: 2021-05-06 | End: 2021-05-06 | Stop reason: HOSPADM

## 2021-05-06 RX ORDER — DEXTROMETHORPHAN/PSEUDOEPHED 2.5-7.5/.8
1.2 DROPS ORAL
Status: DISCONTINUED | OUTPATIENT
Start: 2021-05-06 | End: 2021-05-06 | Stop reason: HOSPADM

## 2021-05-06 RX ORDER — SODIUM CHLORIDE 9 MG/ML
50 INJECTION, SOLUTION INTRAVENOUS CONTINUOUS
Status: DISCONTINUED | OUTPATIENT
Start: 2021-05-06 | End: 2021-05-06 | Stop reason: HOSPADM

## 2021-05-06 RX ORDER — FENTANYL CITRATE 50 UG/ML
25-200 INJECTION, SOLUTION INTRAMUSCULAR; INTRAVENOUS
Status: DISCONTINUED | OUTPATIENT
Start: 2021-05-06 | End: 2021-05-06 | Stop reason: HOSPADM

## 2021-05-06 RX ORDER — PROPOFOL 10 MG/ML
INJECTION, EMULSION INTRAVENOUS AS NEEDED
Status: DISCONTINUED | OUTPATIENT
Start: 2021-05-06 | End: 2021-05-06 | Stop reason: HOSPADM

## 2021-05-06 RX ADMIN — PROPOFOL 100 MG: 10 INJECTION, EMULSION INTRAVENOUS at 16:17

## 2021-05-06 RX ADMIN — PROPOFOL 40 MG: 10 INJECTION, EMULSION INTRAVENOUS at 16:19

## 2021-05-06 RX ADMIN — PROPOFOL 40 MG: 10 INJECTION, EMULSION INTRAVENOUS at 16:18

## 2021-05-06 RX ADMIN — PROPOFOL 40 MG: 10 INJECTION, EMULSION INTRAVENOUS at 16:20

## 2021-05-06 RX ADMIN — LIDOCAINE HYDROCHLORIDE 100 MG: 20 INJECTION, SOLUTION EPIDURAL; INFILTRATION; INTRACAUDAL; PERINEURAL at 16:17

## 2021-05-06 RX ADMIN — SODIUM CHLORIDE: 900 INJECTION, SOLUTION INTRAVENOUS at 16:07

## 2021-05-06 NOTE — ANESTHESIA PREPROCEDURE EVALUATION
Relevant Problems   No relevant active problems       Anesthetic History   No history of anesthetic complications            Review of Systems / Medical History  Patient summary reviewed, nursing notes reviewed and pertinent labs reviewed    Pulmonary  Within defined limits                 Neuro/Psych   Within defined limits           Cardiovascular  Within defined limits                Exercise tolerance: >4 METS     GI/Hepatic/Renal  Within defined limits             Comments: nausea Endo/Other  Within defined limits      Arthritis     Other Findings              Physical Exam    Airway  Mallampati: II  TM Distance: > 6 cm  Neck ROM: normal range of motion   Mouth opening: Normal     Cardiovascular  Regular rate and rhythm,  S1 and S2 normal,  no murmur, click, rub, or gallop             Dental  No notable dental hx       Pulmonary  Breath sounds clear to auscultation               Abdominal  GI exam deferred       Other Findings            Anesthetic Plan    ASA: 2  Anesthesia type: MAC          Induction: Intravenous  Anesthetic plan and risks discussed with: Patient

## 2021-05-06 NOTE — ANESTHESIA POSTPROCEDURE EVALUATION
Post-Anesthesia Evaluation and Assessment    Patient: Bill Mayo MRN: [id number]  SSN: [id number]    YOB: 1975  Age: 39 y.o. Sex: female      I have evaluated the patient and they are stable and ready for discharge from the PACU. Cardiovascular Function/Vital Signs  Visit Vitals  /72   Pulse (!) 56   Temp 36.7 °C (98.1 °F)   Resp 16   Ht 6' (1.829 m)   Wt 87 kg (191 lb 12.8 oz)   SpO2 98%   Breastfeeding No   BMI 26.01 kg/m²       Patient is status post MAC anesthesia for Procedure(s):  ESOPHAGOGASTRODUODENOSCOPY (EGD)  ESOPHAGOGASTRODUODENAL (EGD) BIOPSY. Nausea/Vomiting: None    Postoperative hydration reviewed and adequate. Pain:  Pain Scale 1: Numeric (0 - 10) (05/06/21 1633)  Pain Intensity 1: 0 (05/06/21 1633)   Managed    Neurological Status: At baseline    Mental Status, Level of Consciousness: Alert and  oriented to person, place, and time    Pulmonary Status:   O2 Device: None (Room air) (05/06/21 1633)   Adequate oxygenation and airway patent    Complications related to anesthesia: None    Post-anesthesia assessment completed. No concerns    Signed By: Steve Salazar MD     May 6, 2021              Procedure(s):  ESOPHAGOGASTRODUODENOSCOPY (EGD)  ESOPHAGOGASTRODUODENAL (EGD) BIOPSY. MAC    <BSHSIANPOST>    INITIAL Post-op Vital signs:   Vitals Value Taken Time   /72 05/06/21 1641   Temp     Pulse 55 05/06/21 1644   Resp 13 05/06/21 1644   SpO2 100 % 05/06/21 1644   Vitals shown include unvalidated device data.

## 2021-05-06 NOTE — H&P
[address]  [facility], [address] [place] [address]      History and Physical       NAME:  Mary Calderon   :   1975   MRN:   [id number]             History of Present Illness:  Patient is a 39 y.o. who is seen for epigastric pain. PMH:  Past Medical History:   Diagnosis Date    Anemia     Arthritis     Rt  Knee    Basal cell carcinoma 6944    Complication of anesthesia     decreased HR    Headache     had covid 2020    Infertility, female     6 rounds of IUI - not for infertility, but no partner    Myocarditis ([facility] [address]        PSH:  Past Surgical History:   Procedure Laterality Date    HX KNEE ARTHROSCOPY Right 2000    HX KNEE ARTHROSCOPY Right 2000    HX MALIGNANT SKIN LESION EXCISION  2019    HX MALIGNANT SKIN LESION EXCISION  10/2019    HX POLYPECTOMY  2016    uterine    HX SKIN BIOPSY      Basal cell carcinoma removed from face, nose, L and R arm and abdomen    HX WISDOM TEETH EXTRACTION         Allergies: Allergies   Allergen Reactions    Amoxicillin Other (comments)     Migraines    Anesthetic [Benzocaine-Aloe Vera] Other (comments)     ANESTHESIA results in decreased heart rate (in the 30s)  Has happened twice       Home Medications:  Prior to Admission Medications   Prescriptions Last Dose Informant Patient Reported? Taking?   ascorbic acid, vitamin C, (Vitamin C) 500 mg tablet Not Taking at Unknown time  Yes No   Sig: Take 1,000 mg by mouth. ibuprofen (AdviL) 100 mg tablet Not Taking at Unknown time  Yes No   Sig: Take 100 mg by mouth every six (6) hours as needed for Pain. sertraline (ZOLOFT) 50 mg tablet Not Taking at Unknown time  Yes No   Sig: Take  by mouth daily. zinc 50 mg tab tablet Not Taking at Unknown time  Yes No   Sig: Take  by mouth daily.       Facility-Administered Medications: None       Hospital Medications:  Current Facility-Administered Medications   Medication Dose Route Frequency    0.9% sodium chloride infusion 50 mL/hr IntraVENous CONTINUOUS    sodium chloride (NS) flush 5-40 mL  5-40 mL IntraVENous Q8H    sodium chloride (NS) flush 5-40 mL  5-40 mL IntraVENous PRN    midazolam (VERSED) injection 0.25-5 mg  0.25-5 mg IntraVENous Multiple    fentaNYL citrate (PF) injection  mcg   mcg IntraVENous Multiple    naloxone (NARCAN) injection 0.4 mg  0.4 mg IntraVENous Multiple    flumazeniL (ROMAZICON) 0.1 mg/mL injection 0.2 mg  0.2 mg IntraVENous Multiple    simethicone (MYLICON) 57KV/8.1DP oral drops 80 mg  1.2 mL Oral Multiple    atropine injection 0.5 mg  0.5 mg IntraVENous ONCE PRN    EPINEPHrine (ADRENALIN) 0.1 mg/mL syringe 1 mg  1 mg Endoscopically ONCE PRN       Social History:  Social History     Tobacco Use    Smoking status: Never Smoker    Smokeless tobacco: Never Used   Substance Use Topics    Alcohol use: No       Family History:  Family History   Problem Relation Age of Onset    Cancer Mother         soft tissue sarcoma    Diabetes Mother     Hypertension Mother     Cancer Father         prostate & skin    Bleeding Prob Brother         blood clot after surgery    Breast Cancer Paternal Grandmother         postmen    Breast Cancer Maternal Aunt         postmen         The patient was counseled at length about the risks of jennifer Covid-19 in the alton-operative and post-operative states including the recovery window of their procedure. The patient was made aware that jennifer Covid-19 after a surgical procedure may worsen their prognosis for recovering from the virus and lend to a higher morbidity and or mortality risk. The patient was given the options of postponing their procedure. All of the risks, benefits, and alternatives were discussed. The patient does  wish to proceed with the procedure.     Review of Systems:      Constitutional: negative fever, negative chills, negative weight loss  Eyes:   negative visual changes  ENT:   negative sore throat, tongue or lip swelling  Respiratory:  negative cough, negative dyspnea  Cards:  negative for chest pain, palpitations, lower extremity edema  GI:   See HPI  :  negative for frequency, dysuria  Integument:  negative for rash and pruritus  Heme:  negative for easy bruising and gum/nose bleeding  Musculoskel: negative for myalgias,  back pain and muscle weakness  Neuro: negative for headaches, dizziness, vertigo  Psych:  negative for feelings of anxiety, depression       Objective:   No data found. No intake/output data recorded. No intake/output data recorded. EXAM:     NEURO-a&o   HEENT-wnl   LUNGS-clear    COR-regular rate and rhythym     ABD-soft , no tenderness, no rebound, good bs     EXT-no edema     Data Review     No results for input(s): WBC, HGB, HCT, PLT, HGBEXT, HCTEXT, PLTEXT in the last 72 hours. No results for input(s): NA, K, CL, CO2, BUN, CREA, GLU, PHOS, CA in the last 72 hours. No results for input(s): AP, TBIL, TP, ALB, GLOB, GGT, AML, LPSE in the last 72 hours. No lab exists for component: SGOT, GPT, AMYP, HLPSE  No results for input(s): INR, PTP, APTT, INREXT in the last 72 hours.        Assessment:     · Epigastric pain     Patient Active Problem List   Diagnosis Code    Twins [id number]       Plan:   ·   · Endoscopic procedure with sedation     Signed By: Cayla Suarez MD     5/6/2021  3:58 PM

## 2021-05-06 NOTE — DISCHARGE INSTRUCTIONS
Patient Education   Patient Education   Patient Education        Gastritis: Care Instructions  Your Care Instructions     Gastritis is a sore and upset stomach. It happens when something irritates the stomach lining. Many things can cause it. These include an infection such as the flu or something you ate or drank. Medicines or a sore on the lining of the stomach (ulcer) also can cause it. Your belly may bloat and ache. You may belch, vomit, and feel sick to your stomach. You should be able to relieve the problem by taking medicine. And it may help to change your diet. If gastritis lasts, your doctor may prescribe medicine. Follow-up care is a key part of your treatment and safety. Be sure to make and go to all appointments, and call your doctor if you are having problems. It's also a good idea to know your test results and keep a list of the medicines you take. How can you care for yourself at home? · If your doctor prescribed antibiotics, take them as directed. Do not stop taking them just because you feel better. You need to take the full course of antibiotics. · Be safe with medicines. If your doctor prescribed medicine to decrease stomach acid, take it as directed. Call your doctor if you think you are having a problem with your medicine. · Do not take any other medicine, including over-the-counter pain relievers, without talking to your doctor first.  · If your doctor recommends over-the-counter medicine to reduce stomach acid, such as Pepcid AC (famotidine), Prilosec (omeprazole), or Tagamet HB (cimetidine) follow the directions on the label. · Drink plenty of fluids to prevent dehydration. Choose water and other caffeine-free clear liquids. If you have kidney, heart, or liver disease and have to limit fluids, talk with your doctor before you increase the amount of fluids you drink. · Limit how much alcohol you drink. · Avoid coffee, tea, cola drinks, chocolate, and other foods with caffeine.  They increase stomach acid. When should you call for help? Call 911 anytime you think you may need emergency care. For example, call if:    · You vomit blood or what looks like coffee grounds.     · You pass maroon or very bloody stools. Call your doctor now or seek immediate medical care if:    · You start breathing fast and have not produced urine in the last 8 hours.     · You cannot keep fluids down. Watch closely for changes in your health, and be sure to contact your doctor if:    · You do not get better as expected. Where can you learn more? Go to http://www.walton.com/  Enter Z536 in the search box to learn more about \"Gastritis: Care Instructions. \"  Current as of: April 15, 2020               Content Version: 12.8  © 2006-2021 [email or web address]. Care instructions adapted under license by [employer] (which disclaims liability or warranty for this information). If you have questions about a medical condition or this instruction, always ask your healthcare professional. [place] [address] any warranty or liability for your use of this information. Esophagitis: Care Instructions  Your Care Instructions     Esophagitis (say \"ih-sof-uh-JY-tus\") is irritation of the esophagus, the tube that carries food from your throat to your stomach. Acid reflux is the most common cause of this condition. When you have reflux, stomach acid and juices flow upward. This can cause pain or a burning feeling in your chest. You may have a sore throat. It may be hard to swallow. Other causes of this condition include some medicines and supplements. Allergies or an infection can also cause it. Your doctor will ask about your symptoms and past health. He or she might do tests to find the cause of your symptoms. Treatment depends on what is causing the problem. Treatment might include changing your diet or taking medicine to relieve your symptoms.  It might also include changing a medicine that is causing your symptoms. If you have reflux, medicine that reduces the stomach acid helps your body heal. It might take 1 to 3 weeks to heal.  Follow-up care is a key part of your treatment and safety. Be sure to make and go to all appointments, and call your doctor if you are having problems. It's also a good idea to know your test results and keep a list of the medicines you take. How can you care for yourself at home? · If you have acid reflux, your doctor may recommend that you:  ? Eat several small meals instead of two or three large meals. After you eat, wait 2 to 3 hours before you lie down. ? Avoid chocolate, mint, alcohol, and spicy foods. ? Don't smoke or use smokeless tobacco. Smoking can make this condition worse. If you need help quitting, talk to your doctor about stop-smoking programs and medicines. These can increase your chances of quitting for good. ? Raise the head of your bed 6 to 8 inches if you have symptoms at night. ? Lose weight if you are overweight. ? Take an over-the-counter antacid, such as Maalox, Mylanta, or Tums. Be careful when you take over-the-counter antacid medicines. Many of these medicines have aspirin in them. Read the label to make sure that you are not taking more than the recommended dose. Too much aspirin can be harmful. ? Take stronger acid reducers. Examples are famotidine (such as Pepcid) and omeprazole (such as Prilosec). · If your condition is caused by infection, allergy, or other problems, use the medicine or treatments that your doctor recommends. · Be safe with medicines. Take your medicines exactly as prescribed. Call your doctor if you think you are having a problem with your medicine. When should you call for help? Call your doctor now or seek immediate medical care if:    · You have new or worse belly pain.     · You are vomiting.    Watch closely for changes in your health, and be sure to contact your doctor if:    · You have new or worse symptoms of reflux.     · You have trouble or pain swallowing.     · You are losing weight.     · You do not get better as expected. Where can you learn more? Go to http://www.gray.com/  Enter N977 in the search box to learn more about \"Esophagitis: Care Instructions. \"  Current as of: April 15, 2020               Content Version: 12.8  © 2006-2021 [employer]. Care instructions adapted under license by [employer] (which disclaims liability or warranty for this information). If you have questions about a medical condition or this instruction, always ask your healthcare professional. [place] [address] any warranty or liability for your use of this information. Hiatal Hernia: Care Instructions  Your Care Instructions  A hiatal hernia occurs when part of the stomach bulges into the chest cavity. A hiatal hernia may allow stomach acid and juices to back up into the esophagus (acid reflux). This can cause a feeling of burning, warmth, heat, or pain behind the breastbone. This feeling may often occur after you eat, soon after you lie down, or when you bend forward, and it may come and go. You also may have a sour taste in your mouth. These symptoms are commonly known as heartburn or reflux. But not all hiatal hernias cause symptoms. Follow-up care is a key part of your treatment and safety. Be sure to make and go to all appointments, and call your doctor if you are having problems. It's also a good idea to know your test results and keep a list of the medicines you take. How can you care for yourself at home? · Take your medicines exactly as prescribed. Call your doctor if you think you are having a problem with your medicine. · Do not take aspirin or other nonsteroidal anti-inflammatory drugs (NSAIDs), such as ibuprofen (Advil, Motrin) or naproxen (Aleve), unless your doctor says it is okay.  Ask your doctor what you can take for pain. · Your doctor may recommend over-the-counter medicine. For mild or occasional indigestion, antacids such as Tums, Gaviscon, Maalox, or Mylanta may help. Your doctor also may recommend over-the-counter acid reducers, such as famotidine (Pepcid AC), cimetidine (Tagamet HB), or omeprazole (Prilosec). Read and follow all instructions on the label. If you use these medicines often, talk with your doctor. · Change your eating habits. ? It's best to eat several small meals instead of two or three large meals. ? After you eat, wait 2 to 3 hours before you lie down. Late-night snacks aren't a good idea. ? Chocolate, mint, and alcohol can make heartburn worse. They relax the valve between the esophagus and the stomach. ? Spicy foods, foods that have a lot of acid (like tomatoes and oranges), and coffee can make heartburn symptoms worse in some people. If your symptoms are worse after you eat a certain food, you may want to stop eating that food to see if your symptoms get better. · Do not smoke or chew tobacco.  · If you get heartburn at night, raise the head of your bed 6 to 8 inches by putting the frame on blocks or placing a foam wedge under the head of your mattress. (Adding extra pillows does not work.)  · Do not wear tight clothing around your middle. · Lose weight if you need to. Losing just 5 to 10 pounds can help. When should you call for help? Call your doctor now or seek immediate medical care if:    · You have new or worse belly pain.     · You are vomiting. Watch closely for changes in your health, and be sure to contact your doctor if:    · You have new or worse symptoms of indigestion.     · You have trouble or pain swallowing.     · You are losing weight.     · You do not get better as expected. Where can you learn more?   Go to http://www.gray.com/  Enter T074 in the search box to learn more about \"Hiatal Hernia: Care Instructions. \"  Current as of: April 15, 2020               Content Version: 12.8  © 6097-8547 [employer]. Care instructions adapted under license by [employer] (which disclaims liability or warranty for this information). If you have questions about a medical condition or this instruction, always ask your healthcare professional. [place] [address] any warranty or liability for your use of this information. [address]  [address] [facility], [address] [place] [address]    EGD DISCHARGE INSTRUCTIONS    Rosalee Matos  [id number]  1975    Discomfort:  Sore throat- throat lozenges or warm salt water gargle  redness at IV site- apply warm compress to area; if redness or soreness persist- contact your physician  Gaseous discomfort- walking, belching will help relieve any discomfort  You may not operate a vehicle for 12 hours  You may not engage in an occupation involving machinery or appliances for rest of today  You may not drink alcoholic beverages for at least 12 hours  Avoid making any critical decisions for at least 24 hour  DIET  You may resume your regular diet - however -  remember your colon is empty and a heavy meal will produce gas. Avoid these foods:  vegetables, fried / greasy foods, carbonated drinks    ACTIVITY  You may resume your normal daily activities   Spend the remainder of the day resting -  avoid any strenuous activity. CALL M.D.   ANY SIGN OF   Increasing pain, nausea, vomiting  Abdominal distension (swelling)  New increased bleeding (oral or rectal)  Fever (chills)  Pain in chest area  Bloody discharge from nose or mouth  Shortness of breath    Follow-up Instructions:   Call Dr. Charles Jack for any questions or problems and follow up with him in 6 weeks   Telephone # [phone]  Avoid NSAIDS  Take protonix for 6 months  Take bentyl as needed for abdominal pain    ENDOSCOPY FINDINGS:   Your endoscopy showed small non bleeding ulcers in your stomach, small hiatal hernia and esophagitis ( inflammation from acid reflux). Signed By: Shiva Apodaca MD     5/6/2021  4:40 PM         Learning About Coronavirus (COVID-19)  Coronavirus (COVID-19): Overview  What is coronavirus (DOWFQ-30)? The coronavirus disease (COVID-19) is caused by a virus. It is an illness that was first found in [place], [place], in December 2019. It has since spread worldwide. The virus can cause fever, cough, and trouble breathing. In severe cases, it can cause pneumonia and make it hard to breathe without help. It can cause death. Coronaviruses are a large group of viruses. They cause the common cold. They also cause more serious illnesses like Middle East respiratory syndrome (MERS) and severe acute respiratory syndrome (SARS). COVID-19 is caused by a novel coronavirus. That means it's a new type that has not been seen in people before. This virus spreads person-to-person through droplets from coughing and sneezing. It can also spread when you are close to someone who is infected. And it can spread when you touch something that has the virus on it, such as a doorknob or a tabletop. What can you do to protect yourself from coronavirus (COVID-19)? The best way to protect yourself from getting sick is to:  · Avoid areas where there is an outbreak. · Avoid contact with people who may be infected. · Wash your hands often with soap or alcohol-based hand sanitizers. · Avoid crowds and try to stay at least 6 feet away from other people. · Wash your hands often, especially after you cough or sneeze. Use soap and water, and scrub for at least 20 seconds. If soap and water aren't available, use an alcohol-based hand . · Avoid touching your mouth, nose, and eyes. What can you do to avoid spreading the virus to others? To help avoid spreading the virus to others:  · Cover your mouth with a tissue when you cough or sneeze.  Then throw the tissue in the trash.  · Use a disinfectant to clean things that you touch often. · Stay home if you are sick or have been exposed to the virus. Don't go to school, work, or public areas. And don't use public transportation. · If you are sick:  ? Leave your home only if you need to get medical care. But call the doctor's office first so they know you're coming. And wear a face mask, if you have one.  ? If you have a face mask, wear it whenever you're around other people. It can help stop the spread of the virus when you cough or sneeze. ? Clean and disinfect your home every day. Use household  and disinfectant wipes or sprays. Take special care to clean things that you grab with your hands. These include doorknobs, remote controls, phones, and handles on your refrigerator and microwave. And don't forget countertops, tabletops, bathrooms, and computer keyboards. When to call for help  Call 911 anytime you think you may need emergency care. For example, call if:  · You have severe trouble breathing. (You can't talk at all.)  · You have constant chest pain or pressure. · You are severely dizzy or lightheaded. · You are confused or can't think clearly. · Your face and lips have a blue color. · You pass out (lose consciousness) or are very hard to wake up. Call your doctor now if you develop symptoms such as:  · Shortness of breath. · Fever. · Cough. If you need to get care, call ahead to the doctor's office for instructions before you go. Make sure you wear a face mask, if you have one, to prevent exposing other people to the virus. Where can you get the latest information? The following health organizations are tracking and studying this virus. Their websites contain the most up-to-date information. Otf Fernandez also learn what to do if you think you may have been exposed to the virus. · U.S. Centers for Disease Control and Prevention (CDC): The CDC provides updated news about the disease and travel advice.  The website also tells you how to prevent the spread of infection. www.cdc.gov  · World Health Organization [facility]): WHO offers information about the virus outbreaks. WHO also has travel advice. www.who.int  Current as of: April 1, 2020               Content Version: 12.4  © 7331-7767 [employer]. Care instructions adapted under license by your healthcare professional. If you have questions about a medical condition or this instruction, always ask your healthcare professional. [address] any warranty or liability for your use of this information.

## 2021-05-06 NOTE — PROGRESS NOTES

## 2021-05-06 NOTE — PROCEDURES
[address]  174 [facility], [address] [facility] (EGD) Procedure Note    Kulwinder Zhou  1975  [id number]      Procedure: Endoscopic Gastroduodenoscopy with biopsy    Indication:  Abdominal pain, epigastric, nausea     Pre-operative Diagnosis: see indication above    Post-operative Diagnosis: see findings below    : Cayla Suarez MD    Surgical Assistant: Endoscopy Technician-1: Sabina Chaudhari  Endoscopy RN-1: Randi Escamilla RN    Implants:  None    Referring Provider:  Jazmin Murcia MD      Anesthesia/Sedation:  MAC anesthesia Propofol        Procedure Details     After infomed consent was obtained for the procedure, with all risks and benefits of procedure explained the patient was taken to the endoscopy suite and placed in the left lateral decubitus position. Following sequential administration of sedation as per above, the endoscope was inserted into the mouth and advanced under direct vision to third portion of the duodenum. A careful inspection was made as the gastroscope was withdrawn, including a retroflexed view of the proximal stomach; findings and interventions are described below. Findings:   Esophagus:hiatal hernia 3 cm in size   Grade 1 erosive esophagitis  Stomach: gastritis with small non bleeding ulcers in antrum, biopsies taken   Duodenum: normal, random biopsies taken       Therapies:  none    Specimens: as above         EBL: None      Complications:   None; patient tolerated the procedure well. Impression:    -See post-procedure diagnoses.     Recommendations:  -Follow up with me in 6 weeks -No NSAIDS, -start her on protonix 40 mg/d for 6 months  -bentyl prn for pain  -to f/u with Dr Christelle Leavitt for umbilical hernia surgery    Signed By: Cayla Suarez MD     5/6/2021  4:33 PM

## 2021-05-11 ENCOUNTER — HOSPITAL ENCOUNTER (OUTPATIENT)
Dept: ULTRASOUND IMAGING | Age: 46
Discharge: HOME | End: 2021-05-11
Attending: INTERNAL MEDICINE
Payer: COMMERCIAL

## 2021-05-11 DIAGNOSIS — R10.9 ABDOMINAL PAIN: ICD-10-CM

## 2021-05-11 DIAGNOSIS — R11.0 NAUSEA: ICD-10-CM

## 2021-05-11 PROCEDURE — 76700 US EXAM ABDOM COMPLETE: CPT

## 2021-05-31 ENCOUNTER — NURSE TRIAGE (OUTPATIENT)
Dept: OTHER | Facility: CLINIC | Age: 46
End: 2021-05-31

## 2021-05-31 NOTE — TELEPHONE ENCOUNTER
Brief description of triage: stomach pain, nausea, diarrhea, started 2 days ago, when she coughs it hurts right lower back     Triage indicates for patient to [facility], office is closed today     Care advice provided, patient verbalizes understanding; denies any other questions or concerns; instructed to call back for any new or worsening symptoms. Attention Provider: Thank you for allowing me to participate in the care of your patient. The patient was connected to triage in response to symptoms provided. Please do not respond through this encounter as the response is not directed to a shared pool. Reason for Disposition   Nausea is a chronic symptom (recurrent or ongoing AND present > 4 weeks)    Answer Assessment - Initial Assessment Questions  1. NAUSEA SEVERITY: \"How bad is the nausea? \" (e.g., mild, moderate, severe; dehydration, weight loss)    - MILD: loss of appetite without change in eating habits    - MODERATE: decreased oral intake without significant weight loss, dehydration, or malnutrition    - SEVERE: inadequate caloric or fluid intake, significant weight loss, symptoms of dehydration      Severe     2. ONSET: \"When did the nausea begin? \"      2 days ago     3. VOMITING: \"Any vomiting? \" If so, ask: \"How many times today? \"      No vomiting     4. RECURRENT SYMPTOM: \"Have you had nausea before? \" If so, ask: \"When was the last time? \" \"What happened that time? \"      Yes but not to this extent    5. CAUSE: \"What do you think is causing the nausea? \"      Does not know     6. PREGNANCY: \"Is there any chance you are pregnant? \" (e.g., unprotected intercourse, missed birth control pill, broken condom)      Denies    Protocols used: NAUSEA-ADULT-

## 2021-06-03 ENCOUNTER — TRANSCRIBE ORDER (OUTPATIENT)
Dept: SCHEDULING | Age: 46
End: 2021-06-03

## 2021-06-03 DIAGNOSIS — K31.84 GASTROPARESIS: Primary | ICD-10-CM

## 2021-06-08 ENCOUNTER — OFFICE VISIT (OUTPATIENT)
Dept: OBGYN CLINIC | Age: 46
End: 2021-06-08
Payer: COMMERCIAL

## 2021-06-08 VITALS
BODY MASS INDEX: 25.73 KG/M2 | DIASTOLIC BLOOD PRESSURE: 80 MMHG | WEIGHT: 190 LBS | HEIGHT: 72 IN | SYSTOLIC BLOOD PRESSURE: 120 MMHG

## 2021-06-08 DIAGNOSIS — Z01.419 ENCOUNTER FOR GYNECOLOGICAL EXAMINATION WITHOUT ABNORMAL FINDING: Primary | ICD-10-CM

## 2021-06-08 PROCEDURE — 99396 PREV VISIT EST AGE 40-64: CPT | Performed by: OBSTETRICS & GYNECOLOGY

## 2021-06-08 RX ORDER — DESOGESTREL AND ETHINYL ESTRADIOL 21-5 (28)
1 KIT ORAL DAILY
Qty: 3 PACKAGE | Refills: 4 | Status: SHIPPED | OUTPATIENT
Start: 2021-06-08 | End: 2021-12-07 | Stop reason: ALTCHOICE

## 2021-06-08 NOTE — PATIENT INSTRUCTIONS
Well Visit, Ages 25 to 48: Care Instructions Overview Well visits can help you stay healthy. Your doctor has checked your overall health and may have suggested ways to take good care of yourself. Your doctor also may have recommended tests. At home, you can help prevent illness with healthy eating, regular exercise, and other steps. Follow-up care is a key part of your treatment and safety. Be sure to make and go to all appointments, and call your doctor if you are having problems. It's also a good idea to know your test results and keep a list of the medicines you take. How can you care for yourself at home? · Get screening tests that you and your doctor decide on. Screening helps find diseases before any symptoms appear. · Eat healthy foods. Choose fruits, vegetables, whole grains, protein, and low-fat dairy foods. Limit fat, especially saturated fat. Reduce salt in your diet. · Limit alcohol. If you are a man, have no more than 2 drinks a day or 14 drinks a week. If you are a woman, have no more than 1 drink a day or 7 drinks a week. · Get at least 30 minutes of physical activity on most days of the week. Walking is a good choice. You also may want to do other activities, such as running, swimming, cycling, or playing tennis or team sports. Discuss any changes in your exercise program with your doctor. · Reach and stay at a healthy weight. This will lower your risk for many problems, such as obesity, diabetes, heart disease, and high blood pressure. · Do not smoke or allow others to smoke around you. If you need help quitting, talk to your doctor about stop-smoking programs and medicines. These can increase your chances of quitting for good. · Care for your mental health. It is easy to get weighed down by worry and stress. Learn strategies to manage stress, like deep breathing and mindfulness, and stay connected with your family and community.  If you find you often feel sad or hopeless, talk with your doctor. Treatment can help. · Talk to your doctor about whether you have any risk factors for sexually transmitted infections (STIs). You can help prevent STIs if you wait to have sex with a new partner (or partners) until you've each been tested for STIs. It also helps if you use condoms (male or female condoms) and if you limit your sex partners to one person who only has sex with you. Vaccines are available for some STIs, such as HPV. · Use birth control if it's important to you to prevent pregnancy. Talk with your doctor about the choices available and what might be best for you. · If you think you may have a problem with alcohol or drug use, talk to your doctor. This includes prescription medicines (such as amphetamines and opioids) and illegal drugs (such as cocaine and methamphetamine). Your doctor can help you figure out what type of treatment is best for you. · Protect your skin from too much sun. When you're outdoors from 10 a.m. to 4 p.m., stay in the shade or cover up with clothing and a hat with a wide brim. Wear sunglasses that block UV rays. Even when it's cloudy, put broad-spectrum sunscreen (SPF 30 or higher) on any exposed skin. · See a dentist one or two times a year for checkups and to have your teeth cleaned. · Wear a seat belt in the car. When should you call for help? Watch closely for changes in your health, and be sure to contact your doctor if you have any problems or symptoms that concern you. Where can you learn more? Go to http://www.[employer]ered[employer].com/ Enter P072 in the search box to learn more about \"Well Visit, Ages 25 to 48: Care Instructions. \" Current as of: May 27, 2020               Content Version: 12.8 © 1738-2432 [employer]. Care instructions adapted under license by [employer] (which disclaims liability or warranty for this information).  If you have questions about a medical condition or this instruction, always ask your healthcare professional. [place] [address] any warranty or liability for your use of this information.

## 2021-06-08 NOTE — PROGRESS NOTES
Annual exam ages 40-58    Aliza Munoz is a ,  42 y.o. female WHITE No LMP recorded. .    She presents for her annual checkup. She is having no significant problems. Menorrhagia and PMS  Post Covid issues w myocarditis, headaches, stomach ulcers         Menstrual status:    Her periods are light, heavy in flow. She is using five to ten pads or tampons per day, usually regular and last 26-30 days. She denies dysmenorrhea. She reports no premenstrual symptoms. Contraception:    The current method of family planning is none. Sexual history:    She  reports previously being sexually active and has had partner(s) who are Male. She reports using the following method of birth control/protection: None. Medical conditions:    Since her last annual GYN exam about one year ago, she has not the following changes in her health history: none. Pap and Mammogram History:    Her most recent Pap smear was normal, obtained 1 year(s) ago. The patient had a recent mammogram 10/01/20 which was negative for malignancy. Breast Cancer History/Substance Abuse: negative    Osteoporosis History:    Family history does not include a first or second degree relative with osteopenia or osteoporosis.       Past Medical History:   Diagnosis Date    Anemia     Arthritis     Rt  Knee    Basal cell carcinoma 24    Complication of anesthesia     decreased HR    Headache     had covid 2020    Infertility, female     6 rounds of IUI - not for infertility, but no partner    Myocarditis [facility])      Past Surgical History:   Procedure Laterality Date    HX KNEE ARTHROSCOPY Right 2000    HX KNEE ARTHROSCOPY Right 2000    HX MALIGNANT SKIN LESION EXCISION  2019    HX MALIGNANT SKIN LESION EXCISION  10/2019    HX POLYPECTOMY  2016    uterine    HX SKIN BIOPSY      Basal cell carcinoma removed from face, nose, L and R arm and abdomen    HX WISDOM TEETH EXTRACTION         Current Outpatient Medications   Medication Sig Dispense Refill    pantoprazole (PROTONIX) 40 mg tablet Take 1 Tab by mouth Daily (before breakfast) for 90 days. 90 Tab 3    sertraline (ZOLOFT) 50 mg tablet Take  by mouth daily.  ascorbic acid, vitamin C, (Vitamin C) 500 mg tablet Take 1,000 mg by mouth.  zinc 50 mg tab tablet Take  by mouth daily. Allergies: Amoxicillin and Anesthetic [benzocaine-aloe vera]     Tobacco History:  reports that she has never smoked. She has never used smokeless tobacco.  Alcohol Abuse:  reports no history of alcohol use. Drug Abuse:  reports no history of drug use. Family Medical/Cancer History:   Family History   Problem Relation Age of Onset    Cancer Mother         soft tissue sarcoma    Diabetes Mother     Hypertension Mother     Cancer Father         prostate & skin    Bleeding Prob Brother         blood clot after surgery    Breast Cancer Paternal Grandmother         postmen    Breast Cancer Maternal Aunt         postmen        Review of Systems - History obtained from the patient  Constitutional: negative for weight loss, fever, night sweats  HEENT: negative for hearing loss, earache, congestion, snoring, sorethroat  CV: negative for chest pain, palpitations, edema  Resp: negative for cough, shortness of breath, wheezing  GI: negative for change in bowel habits, abdominal pain, black or bloody stools  : negative for frequency, dysuria, hematuria, vaginal discharge  MSK: negative for back pain, joint pain, muscle pain  Breast: negative for breast lumps, nipple discharge, galactorrhea  Skin :negative for itching, rash, hives  Neuro: negative for dizziness, headache, confusion, weakness  Psych: negative for anxiety, depression, change in mood  Heme/lymph: negative for bleeding, bruising, pallor    Physical Exam    There were no vitals taken for this visit.     Constitutional  · Appearance: well-nourished, well developed, alert, in no acute distress    HENT  · Head and Face: appears normal    Chest  · Respiratory Effort: breathing unlabored      Breasts  · Inspection of Breasts: breasts symmetrical, no skin changes, no discharge present, nipple appearance normal, no skin retraction present  · Palpation of Breasts and Axillae: no masses present on palpation, no breast tenderness  · Axillary Lymph Nodes: no lymphadenopathy present    Gastrointestinal  · Abdominal Examination: abdomen non-tender to palpation, normal bowel sounds, no masses present  · Liver and spleen: no hepatomegaly present, spleen not palpable  · Hernias: no hernias identified    Skin  · General Inspection: no rash, no lesions identified    Neurologic/Psychiatric  · Mental Status:  · Orientation: grossly oriented to person, place and time  · Mood and Affect: mood normal, affect appropriate    Genitourinary  · External Genitalia: normal appearance for age, no discharge present, no tenderness present, no inflammatory lesions present, no masses present, no atrophy present  · Vagina: normal vaginal vault without central or paravaginal defects, no discharge present, no inflammatory lesions present, no masses present  · Bladder: non-tender to palpation  · Urethra: appears normal  · Cervix: normal   · Uterus: normal size, shape and consistency  · Adnexa: no adnexal tenderness present, no adnexal masses present  · Perineum: perineum within normal limits, no evidence of trauma, no rashes or skin lesions present  · Anus: anus within normal limits, no hemorrhoids present  · Inguinal Lymph Nodes: no lymphadenopathy present    Assessment:  Routine gynecologic examination  Her current medical status is satisfactory with no evidence of significant gynecologic issues. Menorrhagia and PMS  Plan:  Script mircette  Pap done today  Patient offered and completed [facility] genetic screening questionnaire and  no changes in personal and family pertinent medical history. Patient declines presence of chaperone during today's visit. Counseled re: diet, exercise, healthy lifestyle  Return for yearly wellness visits  Rec annual mammogram

## 2021-06-11 ENCOUNTER — HOSPITAL ENCOUNTER (OUTPATIENT)
Dept: NUCLEAR MEDICINE | Age: 46
Discharge: HOME | End: 2021-06-11
Attending: INTERNAL MEDICINE
Payer: COMMERCIAL

## 2021-06-11 DIAGNOSIS — K31.84 GASTROPARESIS: ICD-10-CM

## 2021-06-11 PROCEDURE — 74011250636 HC RX REV CODE- 250/636: Performed by: INTERNAL MEDICINE

## 2021-06-11 PROCEDURE — 78264 GASTRIC EMPTYING IMG STUDY: CPT

## 2021-06-11 PROCEDURE — A9541 TC99M SULFUR COLLOID: HCPCS | Performed by: INTERNAL MEDICINE

## 2021-06-11 RX ORDER — TECHNETIUM TC 99M SULFUR COLLOID 2 MG
0.32 KIT MISCELLANEOUS
Status: COMPLETED | OUTPATIENT
Start: 2021-06-11 | End: 2021-06-11

## 2021-06-11 RX ADMIN — TECHNETIUM TC 99M SULFUR COLLOID 0.32 MILLICURIE: KIT at 10:48

## 2021-06-25 ENCOUNTER — APPOINTMENT (OUTPATIENT)
Dept: CT IMAGING | Age: 46
End: 2021-06-25
Attending: NURSE PRACTITIONER
Payer: COMMERCIAL

## 2021-06-25 ENCOUNTER — NURSE TRIAGE (OUTPATIENT)
Dept: OTHER | Facility: CLINIC | Age: 46
End: 2021-06-25

## 2021-06-25 ENCOUNTER — HOSPITAL ENCOUNTER (EMERGENCY)
Age: 46
Discharge: HOME | End: 2021-06-25
Attending: EMERGENCY MEDICINE
Payer: COMMERCIAL

## 2021-06-25 VITALS
HEART RATE: 61 BPM | TEMPERATURE: 98.2 F | OXYGEN SATURATION: 100 % | DIASTOLIC BLOOD PRESSURE: 87 MMHG | RESPIRATION RATE: 16 BRPM | SYSTOLIC BLOOD PRESSURE: 133 MMHG

## 2021-06-25 DIAGNOSIS — S02.2XXA CLOSED FRACTURE OF NASAL BONE, INITIAL ENCOUNTER: Primary | ICD-10-CM

## 2021-06-25 PROCEDURE — 70486 CT MAXILLOFACIAL W/O DYE: CPT

## 2021-06-25 PROCEDURE — 99282 EMERGENCY DEPT VISIT SF MDM: CPT

## 2021-06-25 RX ORDER — IBUPROFEN 400 MG/1
800 TABLET ORAL
Status: DISCONTINUED | OUTPATIENT
Start: 2021-06-25 | End: 2021-06-25 | Stop reason: HOSPADM

## 2021-06-25 RX ORDER — CEFDINIR 300 MG/1
300 CAPSULE ORAL 2 TIMES DAILY
Qty: 14 CAPSULE | Refills: 0 | Status: SHIPPED | OUTPATIENT
Start: 2021-06-25 | End: 2021-07-02
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
## 2021-06-26 NOTE — ED NOTES
Discharge instructions given to patient by provider and nurse. Patient has verbalized understanding on medication use and discharge instuctions. Pt ambulated off of unit, denied wheelchair, and is in no apparent sign of distress.

## 2021-06-28 ENCOUNTER — PATIENT OUTREACH (OUTPATIENT)
Dept: OTHER | Age: 46
End: 2021-06-28

## 2021-06-28 NOTE — PROGRESS NOTES
.  [facility] progress note    Patient eligible for Isidrodeja Alcala 994 care management  Discussed the care management program.  Patient agrees to care management services at this time. [employer] employee    Pt was seen at [facility] ER 6/25/21. Diagnosis Comment   Closed fracture of nasal bone, initial encounter      Pt denies nasal bleeding or excessive swelling, but does report difficulty wearing a mask at work. 3/10 on pain scale. Pt denies H/A at this time, but does report recurrent issues with H/A's and nausea related to, \"COVID [place]. \" Pt reported having COVID approx 1 year ago. Pt has reached out to ENT, waiting on return call. PMH:   Past Medical History:   Diagnosis Date    Anemia     Arthritis     Rt  Knee    Basal cell carcinoma 1891    Complication of anesthesia     decreased HR    Headache     had covid July 2020    Infertility, female     6 rounds of IUI - not for infertility, but no partner    Myocarditis ([facility] [address]        Social History:   Social History     Socioeconomic History    Marital status: SINGLE     Spouse name: Not on file    Number of children: Not on file    Years of education: Not on file    Highest education level: Not on file   Occupational History    Not on file   Tobacco Use    Smoking status: Never Smoker    Smokeless tobacco: Never Used   Substance and Sexual Activity    Alcohol use: No    Drug use: No    Sexual activity: Not Currently     Partners: Male     Birth control/protection: None   Other Topics Concern    Not on file   Social History Narrative    Not on file     Social Determinants of Health     Financial Resource Strain:     Difficulty of Paying Living Expenses:    Food Insecurity:     Worried About Running Out of Food in the Last Year:     [address] in the Last Year:    Transportation Needs:     Lack of Transportation (Medical):      Lack of Transportation (Non-Medical):    Physical Activity:     Days of Exercise per Week:     Minutes of Exercise per Session:    Stress:     Feeling of Stress :    Social Connections:     Frequency of Communication with Friends and Family:     Frequency of Social Gatherings with Friends and Family:     Attends Catholic Services:     Active Member of Clubs or Organizations:     Attends Club or Organization Meetings:     Marital Status:    Intimate Partner Violence:     Fear of Current or Ex-Partner:     Emotionally Abused:     Physically Abused:     Sexually Abused:          Patient's primary care provider relationship reviewed with patient and modified, as applicable. Care management assessment completed:    Condition Focused Assessment: Orthopedic Condition Focused Assessment    Skin- any open wounds or incisions? no  Description and location of wound- n/a  Have you recently had any of the following? Any recent changes in activity no  Does patient have incentive spirometer? no  If yes, how frequently is patient using incentive spirometer? n/a  Mobility or assistive device in place n/a  DME needs addressed n/a  PT/OT/ST ordered n/a  Pain rated as 3/10 to nose  Numbness or tingling no  Weakness no  Trouble with coordinating your movement, or change in gait no  New or worsening pain to calf, back of knee or groin no   Redness, swelling to leg or groin n/a  Fever no  Nausea yes  Vomiting no  Chills or clammy skin no  Change in urine output no  Recent change in urinary or bowel continence no  Change in speech no  Difficulty swallowing no  Dizziness/lightheadedness no  Sleep disturbance n/a     Visual changes no  Medication changes? yes        Medications:  New Medications at Discharge: cefdinir (OMNICEF) 300 mg capsule  Changed Medications at Discharge: no  Discontinued Medications at Discharge: no    Current Outpatient Medications   Medication Sig    cefdinir (OMNICEF) 300 mg capsule Take 1 Capsule by mouth two (2) times a day for 7 days.     desog-e.estradioL/e.estradioL (Chloe, Luba,) 0.15-0.02 mgx21 /0.01 mg x 5 tab Take 1 Tablet by mouth daily.  pantoprazole (PROTONIX) 40 mg tablet Take 1 Tab by mouth Daily (before breakfast) for 90 days.  sertraline (ZOLOFT) 50 mg tablet Take  by mouth daily.  ascorbic acid, vitamin C, (Vitamin C) 500 mg tablet Take 1,000 mg by mouth.  zinc 50 mg tab tablet Take  by mouth daily. No current facility-administered medications for this visit. There are no discontinued medications. Performed medication reconciliation with patient, and patient verbalizes understanding of administration of home medications. There were no barriers to obtaining medications identified at this time. Preventive Care     Health Maintenance   Topic Date Due    Hepatitis C Screening  Never done    COVID-19 Vaccine (1) Never done    Lipid Screen  Never done    PAP AKA CERVICAL CYTOLOGY  06/08/2026    DTaP/Tdap/Td series (2 - Td or Tdap) 04/18/2027    Flu Vaccine  Completed    Pneumococcal 0-64 years  Aged Out       Healthy Habits    Nutrition: well balance meal    Movement: every 1-2 hours     Goals   Goals      Attends follow-up appointments as directed. PCP - TBD  ENT - TBD  RTW - 6/28/21        Knowledge and adherence to medication plan. Taking prescribed meds including ABX until gone.  Supportive resources in place to maintain patient in the community (ie. Home Health, DME equipment, refer to, medication assistant plan, etc.)      [facility] - # [phone] 24/7  Nurse Access line 24/7  Be Well  [address] [phone]  HR Service Now - [facility] Workday  IT - [phone]  [facility] Help - 1- [phone]  Associate Services for advice and direction, by calling [phone] and pressing 1  [employer] - [phone] Go to [employer] on the Internet or your mobile device and enter the password [email or web address] to access resources, educational information, and self-service options.  Understands red flags post discharge. You have signs of infection, such as: Increased pain, swelling, warmth, or redness. Red streaks leading from the area. Pus draining from the area. A fever. You have clear fluid draining from your nose. You have vision changes. Your nose is bleeding. You have new or worse pain. Barriers/Support system:  patient and mother      Barriers/Challenges to Care: []  Decline in memory    []  Language barrier     []  Emotional                  []  Limited mobility  []  Lack of motivation     [] Vision, hearing or cognitive impairment []  Knowledge [] Financial Barriers []  Lack of support  [x]  Pain []  Other     PCP/Specialist follow up:   Future Appointments   Date Time Provider Abilio Luis   6/10/2022  8:20 AM [facility]      Reviewed red flags with patient, and patient verbalizes understanding. Patient given an opportunity to ask questions. No other clinical/social/functional needs noted. The patient agrees to contact the PCP office for questions related to their healthcare. The patient expressed thanks, offered no additional questions and ended the call.       Plan for next call: 2 weeks

## 2021-07-11 ENCOUNTER — APPOINTMENT (OUTPATIENT)
Dept: GENERAL RADIOLOGY | Age: 46
End: 2021-07-11
Attending: EMERGENCY MEDICINE
Payer: COMMERCIAL

## 2021-07-11 ENCOUNTER — HOSPITAL ENCOUNTER (EMERGENCY)
Age: 46
Discharge: HOME | End: 2021-07-11
Attending: EMERGENCY MEDICINE | Admitting: STUDENT IN AN ORGANIZED HEALTH CARE EDUCATION/TRAINING PROGRAM
Payer: COMMERCIAL

## 2021-07-11 VITALS
DIASTOLIC BLOOD PRESSURE: 76 MMHG | OXYGEN SATURATION: 99 % | SYSTOLIC BLOOD PRESSURE: 117 MMHG | TEMPERATURE: 98.7 F | WEIGHT: 190 LBS | RESPIRATION RATE: 14 BRPM | HEART RATE: 44 BPM | BODY MASS INDEX: 30.53 KG/M2 | HEIGHT: 66 IN

## 2021-07-11 DIAGNOSIS — R07.89 OTHER CHEST PAIN: Primary | ICD-10-CM

## 2021-07-11 LAB
ALBUMIN SERPL-MCNC: 3.4 G/DL (ref 3.5–5)
ALBUMIN/GLOB SERPL: 0.9 {RATIO} (ref 1.1–2.2)
ALP SERPL-CCNC: 42 U/L (ref 45–117)
ALT SERPL-CCNC: 13 U/L (ref 12–78)
ANION GAP SERPL CALC-SCNC: 5 MMOL/L (ref 5–15)
AST SERPL-CCNC: 18 U/L (ref 15–37)
BASOPHILS # BLD: 0 K/UL (ref 0–0.1)
BASOPHILS NFR BLD: 1 % (ref 0–1)
BILIRUB SERPL-MCNC: 0.3 MG/DL (ref 0.2–1)
BUN SERPL-MCNC: 11 MG/DL (ref 6–20)
BUN/CREAT SERPL: 11 (ref 12–20)
CALCIUM SERPL-MCNC: 8.6 MG/DL (ref 8.5–10.1)
CHLORIDE SERPL-SCNC: 109 MMOL/L (ref 97–108)
CO2 SERPL-SCNC: 26 MMOL/L (ref 21–32)
COMMENT, HOLDF: NORMAL
CREAT SERPL-MCNC: 0.99 MG/DL (ref 0.55–1.02)
DIFFERENTIAL METHOD BLD: ABNORMAL
EOSINOPHIL # BLD: 0.1 K/UL (ref 0–0.4)
EOSINOPHIL NFR BLD: 1 % (ref 0–7)
ERYTHROCYTE [DISTWIDTH] IN BLOOD BY AUTOMATED COUNT: 13.7 % (ref 11.5–14.5)
GLOBULIN SER CALC-MCNC: 3.6 G/DL (ref 2–4)
GLUCOSE SERPL-MCNC: 91 MG/DL (ref 65–100)
HCT VFR BLD AUTO: 32.6 % (ref 35–47)
HGB BLD-MCNC: 10.2 G/DL (ref 11.5–16)
IMM GRANULOCYTES # BLD AUTO: 0 K/UL (ref 0–0.04)
IMM GRANULOCYTES NFR BLD AUTO: 0 % (ref 0–0.5)
LYMPHOCYTES # BLD: 1.5 K/UL (ref 0.8–3.5)
LYMPHOCYTES NFR BLD: 37 % (ref 12–49)
MCH RBC QN AUTO: 25.4 PG (ref 26–34)
MCHC RBC AUTO-ENTMCNC: 31.3 G/DL (ref 30–36.5)
MCV RBC AUTO: 81.3 FL (ref 80–99)
MONOCYTES # BLD: 0.4 K/UL (ref 0–1)
MONOCYTES NFR BLD: 9 % (ref 5–13)
NEUTS SEG # BLD: 2.1 K/UL (ref 1.8–8)
NEUTS SEG NFR BLD: 52 % (ref 32–75)
NRBC # BLD: 0 K/UL (ref 0–0.01)
NRBC BLD-RTO: 0 PER 100 WBC
PLATELET # BLD AUTO: 263 K/UL (ref 150–400)
PMV BLD AUTO: 9.5 FL (ref 8.9–12.9)
POTASSIUM SERPL-SCNC: 3.8 MMOL/L (ref 3.5–5.1)
PROT SERPL-MCNC: 7 G/DL (ref 6.4–8.2)
RBC # BLD AUTO: 4.01 M/UL (ref 3.8–5.2)
SAMPLES BEING HELD,HOLD: NORMAL
SODIUM SERPL-SCNC: 140 MMOL/L (ref 136–145)
TROPONIN I SERPL-MCNC: <0.05 NG/ML
TROPONIN I SERPL-MCNC: <0.05 NG/ML
WBC # BLD AUTO: 4.1 K/UL (ref 3.6–11)

## 2021-07-11 PROCEDURE — 80053 COMPREHEN METABOLIC PANEL: CPT

## 2021-07-11 PROCEDURE — 93005 ELECTROCARDIOGRAM TRACING: CPT

## 2021-07-11 PROCEDURE — 99284 EMERGENCY DEPT VISIT MOD MDM: CPT

## 2021-07-11 PROCEDURE — 85025 COMPLETE CBC W/AUTO DIFF WBC: CPT

## 2021-07-11 PROCEDURE — 84484 ASSAY OF TROPONIN QUANT: CPT

## 2021-07-11 PROCEDURE — 36415 COLL VENOUS BLD VENIPUNCTURE: CPT

## 2021-07-11 PROCEDURE — 71046 X-RAY EXAM CHEST 2 VIEWS: CPT

## 2021-07-11 NOTE — ED TRIAGE NOTES
Patient complains of chest pain that started about 1030am. States she felt weird, loopy, sob. Hx of myocarditis and raynauds since covid in July of 2020.

## 2021-07-11 NOTE — ED PROVIDER NOTES
Ms. Cranford Hodgkins is a 41yo female who presents to the ER with complaints of chest pain. He said that she was [place] today when she began to feel \"loopy and weird. \"  She drove to her parents house. When she got there, she said that she began to feel short of breath, lightheaded, and had chest pain. She reports that both of her arms and hands started to feel numb. She thinks that she was getting panicky and breathing quicker. She denies fevers or chills. 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 No changes with her urine or bowel movements. No swelling in her legs. She reports some nausea, however, this is her baseline. She denies any other complaints.            Past Medical History:   Diagnosis Date    Anemia     Arthritis     Rt  Knee    Basal cell carcinoma 1465    Complication of anesthesia     decreased HR    Headache     had covid July 2020    Infertility, female     6 rounds of IUI - not for infertility, but no partner    Myocarditis [facility])        Past Surgical History:   Procedure Laterality Date    HX KNEE ARTHROSCOPY Right 07/2000    HX KNEE ARTHROSCOPY Right 07/2000    HX MALIGNANT SKIN LESION EXCISION  12/2019    HX MALIGNANT SKIN LESION EXCISION  10/2019    HX POLYPECTOMY  01/2016    uterine    HX SKIN BIOPSY  2012    Basal cell carcinoma removed from face, nose, L and R arm and abdomen    HX WISDOM TEETH EXTRACTION           Family History:   Problem Relation Age of Onset    Cancer Mother         soft tissue sarcoma    Diabetes Mother     Hypertension Mother     Cancer Father         prostate & skin    Bleeding Prob Brother         blood clot after surgery    Breast Cancer Paternal Grandmother         postmen    Breast Cancer Maternal Aunt         postmen       Social History     Socioeconomic History    Marital status: SINGLE     Spouse name: Not on file    Number of children: Not on file    Years of education: Not on file  Highest education level: Not on file   Occupational History    Not on file   Tobacco Use    Smoking status: Never Smoker    Smokeless tobacco: Never Used   Substance and Sexual Activity    Alcohol use: No    Drug use: No    Sexual activity: Not Currently     Partners: Male     Birth control/protection: None   Other Topics Concern    Not on file   Social History Narrative    Not on file     Social Determinants of Health     Financial Resource Strain:     Difficulty of Paying Living Expenses:    Food Insecurity:     Worried About Running Out of Food in the Last Year:     Ran Out of Food in the Last Year:    Transportation Needs:     Lack of Transportation (Medical):  Lack of Transportation (Non-Medical):    Physical Activity:     Days of Exercise per Week:     Minutes of Exercise per Session:    Stress:     Feeling of Stress :    Social Connections:     Frequency of Communication with Friends and Family:     Frequency of Social Gatherings with Friends and Family:     Attends Shinto Services:     Active Member of Clubs or Organizations:     Attends Club or Organization Meetings:     Marital Status:    Intimate Partner Violence:     Fear of Current or Ex-Partner:     Emotionally Abused:     Physically Abused:     Sexually Abused: ALLERGIES: Amoxicillin and Anesthetic [benzocaine-aloe vera]    Review of Systems   Constitutional: Negative for chills and fever. HENT: Negative for rhinorrhea and sore throat. Respiratory: Positive for shortness of breath. Negative for cough. Cardiovascular: Positive for chest pain. Gastrointestinal: Negative for abdominal pain, diarrhea, nausea and vomiting. Genitourinary: Negative for dysuria and urgency. Musculoskeletal: Negative for arthralgias and back pain. Skin: Negative for rash. Neurological: Positive for light-headedness and numbness. Negative for dizziness and weakness.        Vitals:    07/11/21 1157 07/11/21 1219   BP: (!) 131/93    Pulse: 64    Resp: 18    Temp: 98.7 °F (37.1 °C)    SpO2: 100% 100%   Weight: 86.2 kg (190 lb)    Height: 5' 6\" (1.676 m)             Physical Exam     Vital signs reviewed. Nursing notes reviewed. Const:  No acute distress, well developed, well nourished  Head:  Atraumatic, normocephalic  Eyes:  PERRL, conjunctiva normal, no scleral icterus  Neck:  Supple, trachea midline  Cardiovascular: Regular rate  Resp:  No resp distress, no increased work of breathing  Abd:  Soft, non-tender, non-distended  MSK:  No pedal edema, normal ROM  Neuro:  Alert and oriented x3, no cranial nerve defect  Skin:  Warm, dry, intact  Psych: normal mood and affect, behavior is normal, judgement and thought content is normal          MDM  Number of Diagnoses or Management Options     Amount and/or Complexity of Data Reviewed  Clinical lab tests: ordered and reviewed  Tests in the radiology section of CPT®: ordered and reviewed  Review and summarize past medical records: yes    Patient Progress  Patient progress: stable         Procedures    Ms. Jacob Sharma is a 41yo female who presents to the ER with complaints of chest pain. Pt's sx are atypical for ACS. Second set of cardiac enzymes are pending. Pt. Signed out to Dr. Vanessa Ryan, who will assume care of the patient. 3:20 PM  Change of shift. Care of patient taken over from Dr. Fransico Perez; H&P reviewed, handoff complete. Awaiting repeat troponin; plan to dc if negative. 5:33 PM  The patient is resting comfortably and feels better, is alert and in no distress. The repeat examination is unremarkable and benign.  The electrocardiogram shows no signs of acute ischemia and the history, exam, diagnostic testing and current condition do not suggest that this patient is having an acute myocardial infarction, significant arrhythmia, unstable angina, esophageal perforation, pulmonary embolism, aortic dissection, pneumothorax, severe pneumonia, sepsis or other significant pathology that would warrant further testing, continued ED treatment, admission, or cardiology or other specialist consultation at this point. The vital signs have been stable. The patient's condition is stable and appropriate for discharge. The patient will pursue further outpatient evaluation with the primary care physician, other designated physician or cardiologist. The patient and/or caregivers have expressed a clear and thorough understanding and agree to follow up as instructed.

## 2021-07-12 ENCOUNTER — PATIENT OUTREACH (OUTPATIENT)
Dept: OTHER | Age: 46
End: 2021-07-12

## 2021-07-12 NOTE — PROGRESS NOTES
[facility]PRP Outreach     Call placed to pt, Verified  and address for HIPAA security. Goals      Attends follow-up appointments as directed. PCP - TBD  ENT - TBD  RTW - 21 pt was seen at [facility] ER 21 for CP  PCP - TBD, provider on vacation   ENT - attended appt F/U TBD  Cardio - TBD, pt waiting on return call due to provider on vacation          Knowledge and adherence to medication plan. Taking prescribed meds including ABX until gone.  Supportive resources in place to maintain patient in the community (ie. Home Health, DME equipment, refer to, medication assistant plan, etc.)      [facility] - # [phone]   Nurse Access line   Be Well  [address] [place] [address] Urgent [facility] [phone]  HR Service Now - Karol  [facility] Workday  IT - [phone]  [employer] Help - 1- [phone]  Associate Services for advice and direction, by calling [phone] and pressing 1  [facility] - [phone] Go to [employer] on the Internet or your mobile device and enter the password [id number] to access resources, educational information, and self-service options.  Understands red flags post discharge. You have signs of infection, such as: Increased pain, swelling, warmth, or redness. Red streaks leading from the area. Pus draining from the area. A fever. You have clear fluid draining from your nose. You have vision changes. Your nose is bleeding. You have new or worse pain. 21 denies CP or SOB, but does report fatigue. Reported staying hydrated.            CM will f/u in 2 weeks

## 2021-07-13 LAB
ATRIAL RATE: 58 BPM
ATRIAL RATE: 63 BPM
CALCULATED P AXIS, ECG09: 54 DEGREES
CALCULATED P AXIS, ECG09: 57 DEGREES
CALCULATED R AXIS, ECG10: 57 DEGREES
CALCULATED R AXIS, ECG10: 59 DEGREES
CALCULATED T AXIS, ECG11: 47 DEGREES
CALCULATED T AXIS, ECG11: 49 DEGREES
DIAGNOSIS, 93000: NORMAL
DIAGNOSIS, 93000: NORMAL
P-R INTERVAL, ECG05: 194 MS
P-R INTERVAL, ECG05: 198 MS
Q-T INTERVAL, ECG07: 442 MS
Q-T INTERVAL, ECG07: 446 MS
QRS DURATION, ECG06: 78 MS
QRS DURATION, ECG06: 78 MS
QTC CALCULATION (BEZET), ECG08: 437 MS
QTC CALCULATION (BEZET), ECG08: 452 MS
VENTRICULAR RATE, ECG03: 58 BPM
VENTRICULAR RATE, ECG03: 63 BPM

## 2021-07-14 ENCOUNTER — NURSE TRIAGE (OUTPATIENT)
Dept: OTHER | Facility: CLINIC | Age: 46
End: 2021-07-14

## 2021-07-14 NOTE — TELEPHONE ENCOUNTER
Brief description of triage: Patient states that on Sunday the 11 th she did not feel right, started to have chest pain and shortness of breath, called 911 and went to [facility] ED. States that she feels normal at the moment and that she has no new or worsening symptoms. No triage indicated  Care advice provided, patient verbalizes understanding; denies any other questions or concerns; instructed to call back for any new or worsening symptoms. Attention Provider: Thank you for allowing me to participate in the care of your patient. The patient was connected to triage in response to symptoms provided. Please do not respond through this encounter as the response is not directed to a shared pool. Reason for Disposition   Caller has already spoken with the PCP and has no further questions.     Protocols used: NO CONTACT OR DUPLICATE CONTACT CALL-ADULT-

## 2021-07-18 ENCOUNTER — HOSPITAL ENCOUNTER (EMERGENCY)
Age: 46
Discharge: HOME | End: 2021-07-18
Attending: STUDENT IN AN ORGANIZED HEALTH CARE EDUCATION/TRAINING PROGRAM
Payer: COMMERCIAL

## 2021-07-18 ENCOUNTER — NURSE TRIAGE (OUTPATIENT)
Dept: OTHER | Facility: CLINIC | Age: 46
End: 2021-07-18

## 2021-07-18 VITALS
HEART RATE: 58 BPM | OXYGEN SATURATION: 100 % | DIASTOLIC BLOOD PRESSURE: 82 MMHG | SYSTOLIC BLOOD PRESSURE: 124 MMHG | RESPIRATION RATE: 13 BRPM | TEMPERATURE: 98.2 F

## 2021-07-18 DIAGNOSIS — R00.2 PALPITATIONS: Primary | ICD-10-CM

## 2021-07-18 LAB
ALBUMIN SERPL-MCNC: 3.6 G/DL (ref 3.5–5)
ALBUMIN/GLOB SERPL: 0.9 {RATIO} (ref 1.1–2.2)
ALP SERPL-CCNC: 52 U/L (ref 45–117)
ALT SERPL-CCNC: 15 U/L (ref 12–78)
ANION GAP SERPL CALC-SCNC: 5 MMOL/L (ref 5–15)
AST SERPL-CCNC: 18 U/L (ref 15–37)
ATRIAL RATE: 45 BPM
ATRIAL RATE: 52 BPM
BASOPHILS # BLD: 0 K/UL (ref 0–0.1)
BASOPHILS NFR BLD: 1 % (ref 0–1)
BILIRUB SERPL-MCNC: 0.4 MG/DL (ref 0.2–1)
BUN SERPL-MCNC: 11 MG/DL (ref 6–20)
BUN/CREAT SERPL: 11 (ref 12–20)
CALCIUM SERPL-MCNC: 9 MG/DL (ref 8.5–10.1)
CALCULATED P AXIS, ECG09: 34 DEGREES
CALCULATED P AXIS, ECG09: 35 DEGREES
CALCULATED R AXIS, ECG10: 48 DEGREES
CALCULATED R AXIS, ECG10: 51 DEGREES
CALCULATED T AXIS, ECG11: 37 DEGREES
CALCULATED T AXIS, ECG11: 42 DEGREES
CHLORIDE SERPL-SCNC: 111 MMOL/L (ref 97–108)
CO2 SERPL-SCNC: 26 MMOL/L (ref 21–32)
COMMENT, HOLDF: NORMAL
CREAT SERPL-MCNC: 0.99 MG/DL (ref 0.55–1.02)
DIAGNOSIS, 93000: NORMAL
DIAGNOSIS, 93000: NORMAL
DIFFERENTIAL METHOD BLD: ABNORMAL
EOSINOPHIL # BLD: 0 K/UL (ref 0–0.4)
EOSINOPHIL NFR BLD: 1 % (ref 0–7)
ERYTHROCYTE [DISTWIDTH] IN BLOOD BY AUTOMATED COUNT: 13.5 % (ref 11.5–14.5)
GLOBULIN SER CALC-MCNC: 4 G/DL (ref 2–4)
GLUCOSE SERPL-MCNC: 90 MG/DL (ref 65–100)
HCT VFR BLD AUTO: 35.6 % (ref 35–47)
HGB BLD-MCNC: 11.3 G/DL (ref 11.5–16)
IMM GRANULOCYTES # BLD AUTO: 0 K/UL (ref 0–0.04)
IMM GRANULOCYTES NFR BLD AUTO: 0 % (ref 0–0.5)
LYMPHOCYTES # BLD: 2 K/UL (ref 0.8–3.5)
LYMPHOCYTES NFR BLD: 51 % (ref 12–49)
MAGNESIUM SERPL-MCNC: 2.1 MG/DL (ref 1.6–2.4)
MCH RBC QN AUTO: 25.8 PG (ref 26–34)
MCHC RBC AUTO-ENTMCNC: 31.7 G/DL (ref 30–36.5)
MCV RBC AUTO: 81.3 FL (ref 80–99)
MONOCYTES # BLD: 0.3 K/UL (ref 0–1)
MONOCYTES NFR BLD: 7 % (ref 5–13)
NEUTS SEG # BLD: 1.6 K/UL (ref 1.8–8)
NEUTS SEG NFR BLD: 40 % (ref 32–75)
NRBC # BLD: 0 K/UL (ref 0–0.01)
NRBC BLD-RTO: 0 PER 100 WBC
P-R INTERVAL, ECG05: 190 MS
P-R INTERVAL, ECG05: 196 MS
PLATELET # BLD AUTO: 286 K/UL (ref 150–400)
PMV BLD AUTO: 9.4 FL (ref 8.9–12.9)
POTASSIUM SERPL-SCNC: 3.8 MMOL/L (ref 3.5–5.1)
PROT SERPL-MCNC: 7.6 G/DL (ref 6.4–8.2)
Q-T INTERVAL, ECG07: 448 MS
Q-T INTERVAL, ECG07: 502 MS
QRS DURATION, ECG06: 76 MS
QRS DURATION, ECG06: 80 MS
QTC CALCULATION (BEZET), ECG08: 416 MS
QTC CALCULATION (BEZET), ECG08: 434 MS
RBC # BLD AUTO: 4.38 M/UL (ref 3.8–5.2)
SAMPLES BEING HELD,HOLD: NORMAL
SODIUM SERPL-SCNC: 142 MMOL/L (ref 136–145)
TROPONIN I SERPL-MCNC: <0.05 NG/ML
TROPONIN I SERPL-MCNC: <0.05 NG/ML
TSH SERPL DL<=0.05 MIU/L-ACNC: 1.39 UIU/ML (ref 0.36–3.74)
VENTRICULAR RATE, ECG03: 45 BPM
VENTRICULAR RATE, ECG03: 52 BPM
WBC # BLD AUTO: 3.9 K/UL (ref 3.6–11)

## 2021-07-18 PROCEDURE — 80053 COMPREHEN METABOLIC PANEL: CPT

## 2021-07-18 PROCEDURE — 93005 ELECTROCARDIOGRAM TRACING: CPT

## 2021-07-18 PROCEDURE — 84443 ASSAY THYROID STIM HORMONE: CPT

## 2021-07-18 PROCEDURE — 84484 ASSAY OF TROPONIN QUANT: CPT

## 2021-07-18 PROCEDURE — 83735 ASSAY OF MAGNESIUM: CPT

## 2021-07-18 PROCEDURE — 36415 COLL VENOUS BLD VENIPUNCTURE: CPT

## 2021-07-18 PROCEDURE — 85025 COMPLETE CBC W/AUTO DIFF WBC: CPT

## 2021-07-18 PROCEDURE — 99285 EMERGENCY DEPT VISIT HI MDM: CPT

## 2021-07-18 NOTE — ED TRIAGE NOTES
Pt presents to department with a CC of heart palpitations, SOB, and headache that started this afternoon. Pt reports being seen for the same symptoms last Sunday here and this past Friday at [facility]. Pt has appt with cardiologist tomorrow.

## 2021-07-18 NOTE — ED PROVIDER NOTES
Patient is here because of palpitations. She states that last year she was diagnosed with Covid 19 virus infection. She had myocarditis because of it. She has been following with cardiology, Dr. Smith Lanier, who has been monitoring her. She states that she has been having intermittent feelings like her heart is fluttering. Most recent was today. Today was around 12:30 PM.  Happened when she was at rest.  No exertion. No preceding symptoms. Resolved about 1 hour ago. She felt lightheaded and weak. She states this is consistent with her previous episodes. She was seen here in the ED about 1 week ago for this, had normal labs including cardiac troponin x2 and EKG and was discharged to home with instructions follow-up cardiology. She states she has a cardiologist appointment tomorrow. She did see an urgent care doctor 2 days ago for the same type of symptoms and again had a normal work-up. She denies any caffeine intake. No illegal drugs tobacco or alcohol use. Denies any other complaints. No history of thyroid problems.            Past Medical History:   Diagnosis Date    Anemia     Arthritis     Rt  Knee    Basal cell carcinoma 7171    Complication of anesthesia     decreased HR    Headache     had covid July 2020    Infertility, female     6 rounds of IUI - not for infertility, but no partner    Myocarditis [facility])        Past Surgical History:   Procedure Laterality Date    HX KNEE ARTHROSCOPY Right 07/2000    HX KNEE ARTHROSCOPY Right 07/2000    HX MALIGNANT SKIN LESION EXCISION  12/2019    HX MALIGNANT SKIN LESION EXCISION  10/2019    HX POLYPECTOMY  01/2016    uterine    HX SKIN BIOPSY  2012    Basal cell carcinoma removed from face, nose, L and R arm and abdomen    HX WISDOM TEETH EXTRACTION           Family History:   Problem Relation Age of Onset    Cancer Mother         soft tissue sarcoma    Diabetes Mother     Hypertension Mother     Cancer Father         prostate & skin    Bleeding Prob Brother         blood clot after surgery    Breast Cancer Paternal Grandmother         postmen    Breast Cancer Maternal Aunt         postmen       Social History     Socioeconomic History    Marital status: SINGLE     Spouse name: Not on file    Number of children: Not on file    Years of education: Not on file    Highest education level: Not on file   Occupational History    Not on file   Tobacco Use    Smoking status: Never Smoker    Smokeless tobacco: Never Used   Substance and Sexual Activity    Alcohol use: No    Drug use: No    Sexual activity: Not Currently     Partners: Male     Birth control/protection: None   Other Topics Concern    Not on file   Social History Narrative    Not on file     Social Determinants of Health     Financial Resource Strain:     Difficulty of Paying Living Expenses:    Food Insecurity:     Worried About Running Out of Food in the Last Year:     Ran Out of Food in the Last Year:    Transportation Needs:     Lack of Transportation (Medical):  Lack of Transportation (Non-Medical):    Physical Activity:     Days of Exercise per Week:     Minutes of Exercise per Session:    Stress:     Feeling of Stress :    Social Connections:     Frequency of Communication with Friends and Family:     Frequency of Social Gatherings with Friends and Family:     Attends Scientology Services:     Active Member of Clubs or Organizations:     Attends Club or Organization Meetings:     Marital Status:    Intimate Partner Violence:     Fear of Current or Ex-Partner:     Emotionally Abused:     Physically Abused:     Sexually Abused: ALLERGIES: Amoxicillin and Anesthetic [benzocaine-aloe vera]    Review of Systems   Constitutional: Negative for chills, diaphoresis, fatigue and fever. HENT: Negative for rhinorrhea and sore throat. Eyes: Negative for discharge and itching. Respiratory: Negative for cough, shortness of breath and wheezing. Cardiovascular: Positive for palpitations. Negative for chest pain and leg swelling. Gastrointestinal: Negative for abdominal pain, constipation, diarrhea, nausea and vomiting. Genitourinary: Negative for dysuria, flank pain and frequency. Musculoskeletal: Negative for arthralgias, back pain, gait problem, joint swelling, myalgias, neck pain and neck stiffness. Skin: Negative for color change, pallor, rash and wound. Neurological: Positive for light-headedness. Negative for dizziness, seizures, weakness, numbness and headaches. Psychiatric/Behavioral: Negative for confusion. The patient is not nervous/anxious. Vitals:    07/18/21 1354   BP: (!) 144/92   Pulse: 61   Resp: 16   Temp: 98.2 °F (36.8 °C)   SpO2: 100%            Physical Exam     MDM  ED Course as of Jul 18 1754   Sun Jul 18, 2021   1505 Patient examined. With hypertension otherwise vital signs are normal.  Has no complaints at this time. Physical exam is unremarkable. No obvious signs of infection. Reviewed records from her visit at urgent care clinic 2 days ago which included chest x-ray which did not show any acute process. No need to repeat chest x-ray here. Labs ordered at triage include a magnesium within normal limits cardiac troponin undetectable CBC with mild anemia and metabolic panel without acute process. Chest x-ray shows sinus bradycardia normal ST segments normal QRS normal QTC. Will repeat troponin. We will continue to monitor cardiac monitor to determine if there is any arrhythmia. Concern for possible paroxysmal arrhythmia. [AF]   0454 TSH 3RD GENERATION:    TSH 1.39 [AF]   1 Family was concerned that they thought they saw atrial fibrillation on cardiac monitor. Cardiac monitor reviewed no atrial fibrillation detected. Did show artifact. Repeat EKG shows sinus bradycardia rate 45 first-degree AV block with a NV interval of 204 ms, normal QRS, nonspecific ST changes, normal QTC.   We will continue to monitor.    [AF]   1768 TROPONIN I:    Troponin-I, Qt. <0.05 [AF]   1750 Patient rechecked. Patient no acute distress. Feeling fine. Will discharge patient to home with instructions follow-up with her cardiologist appointment tomorrow. Patient given return to ED instructions. Patient verbalized understanding and agreement plan.     [AF]      ED Course User Index  [AF] Ketan Noble DO       Procedures

## 2021-07-18 NOTE — TELEPHONE ENCOUNTER
Reason for Disposition   Dizziness, lightheadedness, or weakness    Answer Assessment - Initial Assessment Questions  1. DESCRIPTION: \"Please describe your heart rate or heart beat that you are having\" (e.g., fast/slow, regular/irregular, skipped or extra beats, \"palpitations\")      Fluttering, feels as though skipping beats in chest, but not when takes pulse    2. ONSET: \"When did it start? \" (Minutes, hours or days)       Started on Sunday with Chest pain went to ED, arms went numbs    3. DURATION: \"How long does it last\" (e.g., seconds, minutes, hours)      30 minutes for this episode    4. PATTERN \"Does it come and go, or has it been constant since it started? \"  \"Does it get worse with exertion? \"   \"Are you feeling it now? \"      Happened this episode while doing nothing    5. TAP: \"Using your hand, can you tap out what you are feeling on a chair or table in front of you, so that I can hear? \" (Note: not all patients can do this)        Unable    6. HEART RATE: \"Can you tell me your heart rate? \" \"How many beats in 15 seconds? \"  (Note: not all patients can do this)        Was dx with sinus bradycardia in ED last Sunday. Sx getting worse    7. RECURRENT SYMPTOM: \"Have you ever had this before? \" If so, ask: \"When was the last time? \" and \"What happened that time? \"       No     8. CAUSE: \"What do you think is causing the palpitations? \"      Unsure    9. CARDIAC HISTORY: \"Do you have any history of heart disease? \" (e.g., heart attack, angina, bypass surgery, angioplasty, arrhythmia)       myocarditis d/t covid    10. OTHER SYMPTOMS: \"Do you have any other symptoms? \" (e.g., dizziness, chest pain, sweating, difficulty breathing)        Headache, weakness, palpitations     11. PREGNANCY: \"Is there any chance you are pregnant? \" \"When was your last menstrual period? \"        No    Protocols used: HEART RATE AND HEARTBEAT QUESTIONS-ADULT-    Brief description of triage: see above.   Was seen in ED last Sunday d/t chest pain and SOB. Also in [facility] on Friday d/t low HR. Today sx worse and changed. Having headaches, palpitations and weakness. Triage indicates for patient to go to ED now. Care advice provided, patient verbalizes understanding; denies any other questions or concerns; instructed to call back for any new or worsening symptoms. Attention Provider: Thank you for allowing me to participate in the care of your patient. The patient was connected to triage in response to symptoms provided. Please do not respond through this encounter as the response is not directed to a shared pool.

## 2021-07-18 NOTE — ED NOTES
SOB, palpitations - hx myocarditis in December following COVID in July, Cardiologist: Dr. Aleksandr Pradhan. 2rd visit for these symptoms. No CP but has been having recently.      KIRT Rodgers  2:31 PM

## 2021-07-19 ENCOUNTER — PATIENT OUTREACH (OUTPATIENT)
Dept: OTHER | Age: 46
End: 2021-07-19

## 2021-07-19 LAB
ATRIAL RATE: 55 BPM
CALCULATED P AXIS, ECG09: 55 DEGREES
CALCULATED R AXIS, ECG10: 63 DEGREES
CALCULATED T AXIS, ECG11: 47 DEGREES
DIAGNOSIS, 93000: NORMAL
P-R INTERVAL, ECG05: 194 MS
Q-T INTERVAL, ECG07: 490 MS
QRS DURATION, ECG06: 74 MS
QTC CALCULATION (BEZET), ECG08: 468 MS
VENTRICULAR RATE, ECG03: 55 BPM

## 2021-07-19 NOTE — PROGRESS NOTES
CM Outreach     Call placed to pt, Verified  and address for HIPAA security. Goals      Attends follow-up appointments as directed. PCP - TBD  ENT - TBD  RTW - 21 pt was seen at [facility] ER 21 for CP  PCP - TBD, provider on vacation   ENT - attended appt F/U TBD  Cardio - TBD, pt waiting on return call due to provider on vacation     21  Pt was seen again at [facility] ER . Diagnosis: Palpitations  PCP - pt left a message TBD  Cardio - attended appt today 21 F/U 21  Echo - 21  GI - attended appt 21 F/U Aug   Holter monitor - wearing X 14 days          Knowledge and adherence to medication plan. Taking prescribed meds including ABX until gone.  Supportive resources in place to maintain patient in the community (ie. Home Health, DME equipment, refer to, medication assistant plan, etc.)      [facility] - # [phone]   Nurse Access line   Be Well  [address] [place] [address] Urgent [facility] [phone]  HR Service Now - [facility] Workday  IT - [phone]  [facility] Help - 1- [phone]  Associate Services for advice and direction, by calling [phone] and pressing 1  [facility] - [phone] Go to [employer] on the Internet or your mobile device and enter the password [email or web address] to access resources, educational information, and self-service options.  Understands red flags post discharge. You have signs of infection, such as: Increased pain, swelling, warmth, or redness. Red streaks leading from the area. Pus draining from the area. A fever. You have clear fluid draining from your nose. You have vision changes. Your nose is bleeding. You have new or worse pain. 21 denies CP or SOB, but does report fatigue. Reported staying hydrated. 21 denies CP, SOB, wheeze, N/V.  Pt does report a H/A today and frustrated due to inability to find the cause of s/s. Pt denies consuming caffeine, stimulants, herbal supplements etc.           CM will f/u in 1 week as previously scheduled.

## 2021-07-29 ENCOUNTER — TELEPHONE (OUTPATIENT)
Dept: NEUROLOGY | Age: 46
End: 2021-07-29

## 2021-07-29 NOTE — TELEPHONE ENCOUNTER
Pt calling stating her migraines went away for a little while and now they are back. She said they have been back for about 2 to 3 days. She would like to know what she should do.  Please call back

## 2021-08-04 ENCOUNTER — OFFICE VISIT (OUTPATIENT)
Dept: NEUROLOGY | Age: 46
End: 2021-08-04
Payer: COMMERCIAL

## 2021-08-04 VITALS
OXYGEN SATURATION: 98 % | DIASTOLIC BLOOD PRESSURE: 80 MMHG | SYSTOLIC BLOOD PRESSURE: 138 MMHG | RESPIRATION RATE: 18 BRPM

## 2021-08-04 DIAGNOSIS — F41.9 ANXIETY DISORDER, UNSPECIFIED TYPE: ICD-10-CM

## 2021-08-04 DIAGNOSIS — G43.009 MIGRAINE WITHOUT AURA AND WITHOUT STATUS MIGRAINOSUS, NOT INTRACTABLE: Primary | ICD-10-CM

## 2021-08-04 PROCEDURE — 99214 OFFICE O/P EST MOD 30 MIN: CPT | Performed by: PSYCHIATRY & NEUROLOGY

## 2021-08-04 NOTE — LETTER
8/4/2021    Patient: Jerad Santos   YOB: 1975   Date of Visit: 8/4/2021     Leonides Piper MD  [address] [place] [address]  Via Fax: [phone]    Dear Leonides Piper MD,      Thank you for referring Ms. Ilsa Zhu to [address] [place] [address] for evaluation. My notes for this consultation are attached. If you have questions, please do not hesitate to call me. I look forward to following your patient along with you.       Sincerely,    Mattie Amezquita, DO

## 2021-08-04 NOTE — PROGRESS NOTES
Neurology Clinic Follow up Note    Patient ID:  Niranjan Hamlin  [id number]  42 y.o.  1975      Ms. Cranford Hodgkins is here for follow up today of  Chief Complaint   Patient presents with    Migraine          Last Appointment With Me:  1/14/2021    \"45 y.o.  female presenting for evaluation of headaches. Patient reports onset of headache 2-3/2020. Headaches were associated with nausea lasting up to 24h occurring every 2 weeks on average. She was diagnosed with COVID July 2020 with worsening of headaches. Location: Bi-temporal, R alton-orbital  Character: Pulsation  Intensity: On average 3-7/10  Frequency: 1-2x weekly  # HA free days per month: 20  Duration: 24h  Aura: None  Associated Sx with HA: no nausea/vomiting, no phonophotophobia. Neurological ROS: Denies focal weakness, numbness or vision loss associated with headaches  Systemic ROS:   No h/o snoring  Caffeine use: None  H/O Head trauma: None  Depressive or anxiety Sx: None    Any change in pattern of HA? See above    Triggers: None. No worsening reported with cough/sneeze, bending over  Alleviating factors: N/A  FHx HA/migraine: None\"    Interval History:   Headaches were doing very well until last week when they returned with nausea. Seen in ED 8/3/21 due to headache with dizziness/LH, numbness of the extremities b/l with increased anxiety suspicious for anxiety/panic attack. Headaches is over the R temporal region, no associated aura. Head CT was performed without acute pathology, L temporoparietal bony density c/w possible tiny meningioma. She is using Advil for rescue headache therapy without relief of pain. She just started sertraline for anxiety a few days ago. She is tearful today, very anxious x 2-3 weeks surrounding recently worsening COVID in the community. She is following with Psychology as well. PMHx/ PSHx/ FHx/ SHx:  Reviewed and unchanged previous visit.    Past Medical History:   Diagnosis Date    Anemia     Arthritis     Rt  Knee  Basal cell carcinoma 9380    Complication of anesthesia     decreased HR    Headache     had covid July 2020    Infertility, female     6 rounds of IUI - not for infertility, but no partner    Myocarditis ([facility] [address]          ROS:  Comprehensive review of systems negative except for as noted above. Objective:       Meds:  Current Outpatient Medications   Medication Sig Dispense Refill    desog-e.estradioL/e.estradioL (Mircette, 28,) 0.15-0.02 mgx21 /0.01 mg x 5 tab Take 1 Tablet by mouth daily. 3 Package 4    pantoprazole (PROTONIX) 40 mg tablet Take 1 Tab by mouth Daily (before breakfast) for 90 days. 90 Tab 3    sertraline (ZOLOFT) 50 mg tablet Take  by mouth daily. Exam:  Visit Vitals  /80   Resp 18   SpO2 98%     NEUROLOGICAL EXAM:  General: Awake, alert, speech fluent  CN: PERRL, EOMI without nystagmus, VFF to confrontation, facial sensation and strength are normal and symmetric, hearing is intact to finger rub bilaterally, palate and tongue movements are intact and symmetric. Motor: Normal tone, bulk and strength bilaterally. Reflexes: Deferred  Coordination: FNF, MANDIE, HTS intact. Sensation: LT intact throughout. Gait: Normal-based and steady. LABS  Results for orders placed or performed during the hospital encounter of 07/18/21   CBC WITH AUTOMATED DIFF   Result Value Ref Range    WBC 3.9 3.6 - 11.0 K/uL    RBC 4.38 3.80 - 5.20 M/uL    HGB 11.3 (L) 11.5 - 16.0 g/dL    HCT 35.6 35.0 - 47.0 %    MCV 81.3 80.0 - 99.0 FL    MCH 25.8 (L) 26.0 - 34.0 PG    MCHC 31.7 30.0 - 36.5 g/dL    RDW 13.5 11.5 - 14.5 %    PLATELET 383 783 - 520 K/uL    MPV 9.4 8.9 - 12.9 FL    NRBC 0.0 0  WBC    ABSOLUTE NRBC 0.00 0.00 - 0.01 K/uL    NEUTROPHILS 40 32 - 75 %    LYMPHOCYTES 51 (H) 12 - 49 %    MONOCYTES 7 5 - 13 %    EOSINOPHILS 1 0 - 7 %    BASOPHILS 1 0 - 1 %    IMMATURE GRANULOCYTES 0 0.0 - 0.5 %    ABS. NEUTROPHILS 1.6 (L) 1.8 - 8.0 K/UL    ABS.  LYMPHOCYTES 2.0 0.8 - 3.5 K/UL ABS. MONOCYTES 0.3 0.0 - 1.0 K/UL    ABS. EOSINOPHILS 0.0 0.0 - 0.4 K/UL    ABS. BASOPHILS 0.0 0.0 - 0.1 K/UL    ABS. IMM. GRANS. 0.0 0.00 - 0.04 K/UL    DF AUTOMATED     METABOLIC PANEL, COMPREHENSIVE   Result Value Ref Range    Sodium 142 136 - 145 mmol/L    Potassium 3.8 3.5 - 5.1 mmol/L    Chloride 111 (H) 97 - 108 mmol/L    CO2 26 21 - 32 mmol/L    Anion gap 5 5 - 15 mmol/L    Glucose 90 65 - 100 mg/dL    BUN 11 6 - 20 MG/DL    Creatinine 0.99 0.55 - 1.02 MG/DL    BUN/Creatinine ratio 11 (L) 12 - 20      GFR est AA >60 >60 ml/min/1.73m2    GFR est non-AA >60 >60 ml/min/1.73m2    Calcium 9.0 8.5 - 10.1 MG/DL    Bilirubin, total 0.4 0.2 - 1.0 MG/DL    ALT (SGPT) 15 12 - 78 U/L    AST (SGOT) 18 15 - 37 U/L    Alk. phosphatase 52 45 - 117 U/L    Protein, total 7.6 6.4 - 8.2 g/dL    Albumin 3.6 3.5 - 5.0 g/dL    Globulin 4.0 2.0 - 4.0 g/dL    A-G Ratio 0.9 (L) 1.1 - 2.2     TROPONIN I   Result Value Ref Range    Troponin-I, Qt. <0.05 <0.05 ng/mL   SAMPLES BEING HELD   Result Value Ref Range    SAMPLES BEING HELD 1red,1blue     COMMENT        Add-on orders for these samples will be processed based on acceptable specimen integrity and analyte stability, which may vary by analyte.    MAGNESIUM   Result Value Ref Range    Magnesium 2.1 1.6 - 2.4 mg/dL   TROPONIN I   Result Value Ref Range    Troponin-I, Qt. <0.05 <0.05 ng/mL   TSH 3RD GENERATION   Result Value Ref Range    TSH 1.39 0.36 - 3.74 uIU/mL   EKG, 12 LEAD, INITIAL   Result Value Ref Range    Ventricular Rate 52 BPM    Atrial Rate 52 BPM    P-R Interval 190 ms    QRS Duration 76 ms    Q-T Interval 448 ms    QTC Calculation (Bezet) 416 ms    Calculated P Axis 34 degrees    Calculated R Axis 51 degrees    Calculated T Axis 42 degrees    Diagnosis       Sinus bradycardia with sinus arrhythmia  When compared with ECG of 11-JUL-2021 12:03,  No significant change  Confirmed by Simin Colon MD. ([id number]) on 7/18/2021 11:03:23 PM     EKG, 12 LEAD, SUBSEQUENT   Result Value Ref Range    Ventricular Rate 45 BPM    Atrial Rate 45 BPM    P-R Interval 196 ms    QRS Duration 80 ms    Q-T Interval 502 ms    QTC Calculation (Bezet) 434 ms    Calculated P Axis 35 degrees    Calculated R Axis 48 degrees    Calculated T Axis 37 degrees    Diagnosis       Marked sinus bradycardia with sinus arrhythmia  When compared with ECG of 18-JUL-2021 14:05,  No significant change  Confirmed by Av Mandujano MD. ([id number]) on 7/18/2021 11:03:34 PM     EKG, 12 LEAD, SUBSEQUENT   Result Value Ref Range    Ventricular Rate 55 BPM    Atrial Rate 55 BPM    P-R Interval 194 ms    QRS Duration 74 ms    Q-T Interval 490 ms    QTC Calculation (Bezet) 468 ms    Calculated P Axis 55 degrees    Calculated R Axis 63 degrees    Calculated T Axis 47 degrees    Diagnosis       Sinus bradycardia with sinus arrhythmia  When compared with ECG of 18-JUL-2021 15:23,  No significant change was found  Confirmed by Raymundo Dance, MD ([id number]) on 7/19/2021 8:20:06 PM         IMAGING:  MRI Results (maximum last 3): Results from [place] encounter on 12/09/20   MRI BRAIN W WO CONT    Narrative EXAM:  MRI BRAIN W WO CONT, MRI BRAIN MRV WO CONT    INDICATION:    worsening headaches, change in headache character post COVID    COMPARISON:  None. CONTRAST: 15 ml Dotarem. TECHNIQUE:    Multiplanar multisequence acquisition without and with contrast of the brain. Time-of-flight noncontrast MRV of the brain was performed. Multiplanar and MIP  reconstructions were obtained. FINDINGS:  MRI brain:  The ventricles are normal in size and position. Minimal (less than 5) tiny  nonspecific T2/FLAIR white matter hyperintensities, of doubtful clinical  significance. There is no acute infarct, hemorrhage, extra-axial fluid  collection, or mass effect. There is no cerebellar tonsillar herniation. Expected arterial flow-voids are present. No evidence of abnormal enhancement. The paranasal sinuses are clear.  Trace bilateral mastoid effusions. The orbital  contents are within normal limits. No significant osseous or scalp lesions are  identified. MRV head: The dural venous sinuses and deep cerebral veins are patent without evidence of  thrombosis. Right dominant transverse sinus, with no significant stenosis of the  transverse sinuses. Impression IMPRESSION:   MRI brain:  1. No acute or significant intracranial abnormality. MRV head:  1. No evidence of venous sinus thrombosis. MRV BRAIN WO CONT    Narrative EXAM:  MRI BRAIN W WO CONT, MRI BRAIN MRV WO CONT    INDICATION:    worsening headaches, change in headache character post COVID    COMPARISON:  None. CONTRAST: 15 ml Dotarem. TECHNIQUE:    Multiplanar multisequence acquisition without and with contrast of the brain. Time-of-flight noncontrast MRV of the brain was performed. Multiplanar and MIP  reconstructions were obtained. FINDINGS:  MRI brain:  The ventricles are normal in size and position. Minimal (less than 5) tiny  nonspecific T2/FLAIR white matter hyperintensities, of doubtful clinical  significance. There is no acute infarct, hemorrhage, extra-axial fluid  collection, or mass effect. There is no cerebellar tonsillar herniation. Expected arterial flow-voids are present. No evidence of abnormal enhancement. The paranasal sinuses are clear. Trace bilateral mastoid effusions. The orbital  contents are within normal limits. No significant osseous or scalp lesions are  identified. MRV head: The dural venous sinuses and deep cerebral veins are patent without evidence of  thrombosis. Right dominant transverse sinus, with no significant stenosis of the  transverse sinuses. Impression IMPRESSION:   MRI brain:  1. No acute or significant intracranial abnormality. MRV head:  1. No evidence of venous sinus thrombosis. Assessment:     Encounter Diagnoses     ICD-10-CM ICD-9-CM   1.  Migraine without aura and without status migrainosus, not intractable  G43.009 346.10   2. Anxiety disorder, unspecified type  F41.9 27.00     42year old pleasant female here for f/u of episodic migrainous headaches since 2/2020 with interval worsening frequency post COVID 7/2020. She has noted a recent spike in headaches with increased anxiety/panic attacks. Neurological examination is non-focal. MRI Brain/MRV were previously obtained showing no evidence of acute structural pathology, mass or venous sinus thrombosis. Minimal T2 hyperintensities noted are non-specific and possible related to headache given lack of vascular risk factors. CT head was recently completed during ED evaluation 8/3/21 with headache and symptoms concerning for panic attack showing no acute intracranial pathology, L temporoparietal bony density c/w possible tiny meningioma. Will obtain repeat intracranial imaging for further evaluation. I have encouraged her to continue with sertraline recently started by her PCP to address anxiety disorder. She understands it will take at least 2-3 weeks until therapeutic benefit. I am hopeful this may also help attenuate her headaches. I have provided her with samples of Ubrelvy for migraine rescue therapy. PLAN:  MRI Brain WWO  Continue sertraline for anxiety and migraine prophylaxis  Trial of Ubrelvy 50mg PRN for migraine rescue therapy    Follow-up and Dispositions    · Return in about 2 months (around 10/4/2021). I have discussed the diagnosis with the patient today and the intended plan as seen in the above orders with both the patient as well as referring provider and/or PCP via electronic correspondence. The patient has received an after-visit summary and questions were answered concerning future plans.     Signed:  Whitley Young DO  8/4/2021  9:50 AM

## 2021-08-04 NOTE — PATIENT INSTRUCTIONS
[facility]   Statement to Patients  April 1, 2014      In an effort to ensure the large volume of patient prescription refills is processed in the most efficient and expeditious manner, we are asking our patients to assist us by calling your Pharmacy for all prescription refills, this will include also your  Mail Order Pharmacy. The pharmacy will contact our office electronically to continue the refill process. Please do not wait until the last minute to call your pharmacy. We need at least 48 hours (2days) to fill prescriptions. We also encourage you to call your pharmacy before going to  your prescription to make sure it is ready. With regard to controlled substance prescription refill requests (narcotic refills) that need to be picked up at our office, we ask your cooperation by providing us with at least 72 hours (3days) notice that you will need a refill. We will not refill narcotic prescription refill requests after 4:00pm on any weekday, Monday through Thursday, or after 2:00pm on Fridays, or on the weekends. We encourage everyone to explore another way of getting your prescription refill request processed using [email or web address], our patient web portal through our electronic medical record system. [email or web address] is an efficient and effective way to communicate your medication request directly to the office and  downloadable as an rossi on your smart phone . [email or web address] also features a review functionality that allows you to view your medication list as well as leave messages for your physician. Are you ready to get connected? If so please review the attatched instructions or speak to any of our staff to get you set up right away! Thank you so much for your cooperation. Should you have any questions please contact our Practice Administrator.     The Physicians and Staff,  [facility]

## 2021-08-05 ENCOUNTER — PATIENT OUTREACH (OUTPATIENT)
Dept: OTHER | Age: 46
End: 2021-08-05

## 2021-08-05 NOTE — PROGRESS NOTES
HPRP Outreach    Goals      Attends follow-up appointments as directed. PCP - TBD  ENT - TBD  RTW - 6/28/21 7/12/21 pt was seen at [facility] ER 7/11/21 for CP  PCP - TBD, provider on vacation   ENT - attended appt F/U TBD  Cardio - TBD, pt waiting on return call due to provider on vacation     7/19/21  Pt was seen again at [facility] ER 7/18. Diagnosis: Palpitations  PCP - pt left a message TBD  Cardio - attended appt today 7/19/21 F/U 7/27/21  Echo - 8/25/21  GI - attended appt 7/13/21 F/U Aug   Holter monitor - wearing X 14 days     8/5/21 Call placed to patient, no answer. Voicemail left to return call.  Knowledge and adherence to medication plan. Taking prescribed meds including ABX until gone.  Supportive resources in place to maintain patient in the community (ie. Home Health, DME equipment, refer to, medication assistant plan, etc.)      [facility] - # [phone] 24/7  Nurse Access line 24/7  Be Well  [address] [place] [address] Urgent [facility] [phone]  HR Service Now - Karol  [facility] Workday  IT - [phone]  [facility] Help - 1- [phone]  Associate Services for advice and direction, by calling [phone] and pressing 1  [facility] - [phone] Go to [employer] on the Internet or your mobile device and enter the password [email or web address] to access resources, educational information, and self-service options.  Understands red flags post discharge. You have signs of infection, such as: Increased pain, swelling, warmth, or redness. Red streaks leading from the area. Pus draining from the area. A fever. You have clear fluid draining from your nose. You have vision changes. Your nose is bleeding. You have new or worse pain. 7/12/21 denies CP or SOB, but does report fatigue. Reported staying hydrated. 7/19/21 denies CP, SOB, wheeze, N/V.  Pt does report a H/A today and frustrated due to inability to find the cause of s/s.  Pt denies consuming caffeine, stimulants, herbal supplements etc.           CM will f/u in 2 weeks

## 2021-08-11 ENCOUNTER — TELEPHONE (OUTPATIENT)
Dept: NEUROLOGY | Age: 46
End: 2021-08-11

## 2021-08-11 NOTE — TELEPHONE ENCOUNTER
Re: Radha Godinez M Narayanan# [id number]    Tried to submit PA but based on clincal questions pt might need to try 1 triptan 1st. Did not complete PA and snt message to provider.

## 2021-08-12 NOTE — TELEPHONE ENCOUNTER
Spoke with patient, she states she picked up medication yesterday and it was $10. Did not need savings card.

## 2021-08-21 ENCOUNTER — HOSPITAL ENCOUNTER (OUTPATIENT)
Dept: MRI IMAGING | Age: 46
Discharge: HOME | End: 2021-08-21
Attending: PSYCHIATRY & NEUROLOGY
Payer: COMMERCIAL

## 2021-08-21 DIAGNOSIS — G43.009 MIGRAINE WITHOUT AURA AND WITHOUT STATUS MIGRAINOSUS, NOT INTRACTABLE: ICD-10-CM

## 2021-08-21 PROCEDURE — 70553 MRI BRAIN STEM W/O & W/DYE: CPT

## 2021-08-21 PROCEDURE — 74011636320 HC RX REV CODE- 636/320

## 2021-08-21 PROCEDURE — A9576 INJ PROHANCE MULTIPACK: HCPCS

## 2021-08-21 RX ADMIN — GADOTERIDOL 15 ML: 279.3 INJECTION, SOLUTION INTRAVENOUS at 09:35

## 2021-08-31 ENCOUNTER — PATIENT OUTREACH (OUTPATIENT)
Dept: OTHER | Age: 46
End: 2021-08-31

## 2021-08-31 LAB
CHOLEST SERPL-MCNC: 173 MG/DL
GLUCOSE SERPL-MCNC: 84 MG/DL (ref 65–100)
HDLC SERPL-MCNC: 77 MG/DL
LDLC SERPL CALC-MCNC: 74 MG/DL (ref 0–100)
TRIGL SERPL-MCNC: 110 MG/DL (ref ?–150)

## 2021-08-31 NOTE — PROGRESS NOTES
HPRP Outreach     Call placed to pt, Verified  and address for HIPAA security. Goals      Attends follow-up appointments as directed. PCP - TBD  ENT - TBD  RTW - 21 pt was seen at [facility] ER 21 for CP  PCP - TBD, provider on vacation   ENT - attended appt F/U TBD  Cardio - TBD, pt waiting on return call due to provider on vacation     21  Pt was seen again at [facility] ER . Diagnosis: Palpitations  PCP - pt left a message TBD  Cardio - attended appt today 21 F/U 21  Echo - 21  GI - attended appt 21 F/U Aug   Holter monitor - wearing X 14 days     21 Call placed to patient, no answer. Voicemail left to return call. 21   PCP - 21  Neuro - 21  Mental health counselor Jade Lanier - weekly zoom  Be Well - today   RTW - 9/10/21 - DAVID due to stress/panic attacks       Knowledge and adherence to medication plan. Taking prescribed meds including ABX until gone.  Supportive resources in place to maintain patient in the community (ie. Home Health, DME equipment, refer to, medication assistant plan, etc.)      [facility] - # [phone]   Nurse Access line   Be Well  [address] [place] [address] Urgent [facility] [phone]  HR Service Now - Karol  [facility] Workday  IT - [phone]  [facility] Help - 1- [phone]  Associate Services for advice and direction, by calling [phone] and pressing 1  [facility] - [phone] Go to [employer] on the Internet or your mobile device and enter the password BS1 to access resources, educational information, and self-service options.  Understands red flags post discharge. You have signs of infection, such as: Increased pain, swelling, warmth, or redness. Red streaks leading from the area. Pus draining from the area. A fever. You have clear fluid draining from your nose. You have vision changes.   Your nose is bleeding. You have new or worse pain. 7/12/21 denies CP or SOB, but does report fatigue. Reported staying hydrated. 7/19/21 denies CP, SOB, wheeze, N/V. Pt does report a H/A today and frustrated due to inability to find the cause of s/s. Pt denies consuming caffeine, stimulants, herbal supplements etc.     8/31/21 denies red flags. Reported CP/palpitations were primarily related to stress/panic attacks.            CM will f/u in 3 weeks

## 2021-09-08 ENCOUNTER — TRANSCRIBE ORDER (OUTPATIENT)
Dept: SCHEDULING | Age: 46
End: 2021-09-08

## 2021-09-08 DIAGNOSIS — Z12.31 VISIT FOR SCREENING MAMMOGRAM: Primary | ICD-10-CM

## 2021-10-07 ENCOUNTER — PATIENT OUTREACH (OUTPATIENT)
Dept: OTHER | Age: 46
End: 2021-10-07

## 2021-10-07 NOTE — PROGRESS NOTES
HPRP outreach    Goals      Attends follow-up appointments as directed. PCP - TBD  ENT - TBD  RTW - 6/28/21 7/12/21 pt was seen at [facility] ER 7/11/21 for CP  PCP - TBD, provider on vacation   ENT - attended appt F/U TBD  Cardio - TBD, pt waiting on return call due to provider on vacation     7/19/21  Pt was seen again at [facility] ER 7/18. Diagnosis: Palpitations  PCP - pt left a message TBD  Cardio - attended appt today 7/19/21 F/U 7/27/21  Echo - 8/25/21  GI - attended appt 7/13/21 F/U Aug   Holter monitor - wearing X 14 days     8/5/21 Call placed to patient, no answer. Voicemail left to return call. 8/31/21   PCP - 9/9/21  Neuro - 11/9/21  Mental health counselor Alan Piper - weekly zoom  Be Well - today   RTW - 9/10/21 - DAVID due to stress/panic attacks    10/7/21 Call placed to patient, no answer. Voicemail left to return call.  Knowledge and adherence to medication plan. Taking prescribed meds including ABX until gone.  Supportive resources in place to maintain patient in the community (ie. Home Health, DME equipment, refer to, medication assistant plan, etc.)      [facility] - # [phone] 24/7  Nurse Access line 24/7  Be Well  [address] [place] [address] Urgent [facility] [phone]  HR Service Now - [facility] Workday  IT - [phone]  [facility] Help - 1- [phone]  Associate Services for advice and direction, by calling [phone] and pressing 1  [facility] - [phone] Go to [employer] on the Internet or your mobile device and enter the password [email or web address] to access resources, educational information, and self-service options.  Understands red flags post discharge. You have signs of infection, such as: Increased pain, swelling, warmth, or redness. Red streaks leading from the area. Pus draining from the area. A fever. You have clear fluid draining from your nose.   You have vision changes. Your nose is bleeding. You have new or worse pain. 7/12/21 denies CP or SOB, but does report fatigue. Reported staying hydrated. 7/19/21 denies CP, SOB, wheeze, N/V. Pt does report a H/A today and frustrated due to inability to find the cause of s/s. Pt denies consuming caffeine, stimulants, herbal supplements etc.     8/31/21 denies red flags. Reported CP/palpitations were primarily related to stress/panic attacks.            CM will f/u in 4 weeks

## 2021-10-25 ENCOUNTER — TRANSCRIBE ORDER (OUTPATIENT)
Dept: SCHEDULING | Age: 46
End: 2021-10-25

## 2021-10-25 DIAGNOSIS — M17.31 POST-TRAUMATIC OSTEOARTHRITIS OF RIGHT KNEE: Primary | ICD-10-CM

## 2021-10-26 ENCOUNTER — HOSPITAL ENCOUNTER (OUTPATIENT)
Dept: MAMMOGRAPHY | Age: 46
Discharge: HOME | End: 2021-10-26
Attending: OBSTETRICS & GYNECOLOGY
Payer: COMMERCIAL

## 2021-10-26 DIAGNOSIS — Z12.31 VISIT FOR SCREENING MAMMOGRAM: ICD-10-CM

## 2021-10-26 PROCEDURE — 77063 BREAST TOMOSYNTHESIS BI: CPT

## 2021-11-01 ENCOUNTER — HOSPITAL ENCOUNTER (OUTPATIENT)
Dept: MRI IMAGING | Age: 46
Discharge: HOME | End: 2021-11-01
Attending: ORTHOPAEDIC SURGERY
Payer: COMMERCIAL

## 2021-11-01 DIAGNOSIS — M17.31 POST-TRAUMATIC OSTEOARTHRITIS OF RIGHT KNEE: ICD-10-CM

## 2021-11-01 PROCEDURE — 73721 MRI JNT OF LWR EXTRE W/O DYE: CPT

## 2021-11-08 ENCOUNTER — OFFICE VISIT (OUTPATIENT)
Dept: OBGYN CLINIC | Age: 46
End: 2021-11-08

## 2021-11-08 ENCOUNTER — HOSPITAL ENCOUNTER (OUTPATIENT)
Dept: LAB | Age: 46
Discharge: HOME | End: 2021-11-08

## 2021-11-08 VITALS — WEIGHT: 179 LBS | SYSTOLIC BLOOD PRESSURE: 118 MMHG | BODY MASS INDEX: 28.89 KG/M2 | DIASTOLIC BLOOD PRESSURE: 80 MMHG

## 2021-11-08 DIAGNOSIS — N93.8 DUB (DYSFUNCTIONAL UTERINE BLEEDING): Primary | ICD-10-CM

## 2021-11-08 PROCEDURE — 99214 OFFICE O/P EST MOD 30 MIN: CPT | Performed by: OBSTETRICS & GYNECOLOGY

## 2021-11-08 NOTE — PATIENT INSTRUCTIONS
Pelvic Exam: Care Instructions  Overview     When your doctor examines your pelvic organs, it's called a pelvic exam. This exam is done to evaluate symptoms, such as pelvic pain or abnormal vaginal bleeding and discharge. It may also be done to collect samples of cells for cervical cancer screening. Before your exam, it's important to share some information with your doctor. You can talk about any concerns you may have. Your doctor will also want to know if you are pregnant or use birth control. And your doctor will want to hear about any problems, surgeries, or procedures you have had in your pelvic area. You will also need to tell your doctor when your last period was. Follow-up care is a key part of your treatment and safety. Be sure to make and go to all appointments, and call your doctor if you are having problems. It's also a good idea to know your test results and keep a list of the medicines you take. How is a pelvic exam done? · During a pelvic exam, you will:  ? Take off your clothes below the waist. You will get a paper or cloth cover to put over the lower half of your body. ? Lie on your back on an exam table with your feet and legs supported by footrests. · The doctor may:  ? Ask you to relax your knees. Your knees need to lean out, toward the walls. ? Check the opening of your vagina for sores or swelling. ? Gently put a tool called a speculum into your vagina. It opens the vagina a little bit. You may feel some pressure. The speculum lets your doctor see inside the vagina. ? Use a small brush, spatula, or swab to get a sample for testing. The doctor then removes the speculum. ? Put on gloves and put one or two fingers of one hand into your vagina. The other hand goes on your lower belly. This lets your doctor feel your pelvic organs. You will probably feel some pressure. ? Put one gloved finger into your rectum and one into your vagina, if needed.  This can also help check your pelvic organs. You may have a small amount of vaginal discharge or bleeding after the exam.  Why is a pelvic exam done? A pelvic exam may be done:  · To collect samples of cells for cervical cancer screening. · To check for vaginal infection. · To check for sexually transmitted infections, such as chlamydia or herpes. · To help find the cause of abnormal uterine bleeding. · To look for problems like uterine fibroids, ovarian cysts, or uterine prolapse. · To help find the cause of pelvic or belly pain. · Before inserting an intrauterine device (IUD). · To collect evidence if you've been sexually assaulted. What are the risks of a pelvic exam?  There is a small chance that the doctor will find something on a pelvic exam that would not have caused a problem. This is called overdiagnosis. It could lead to tests or treatment you don't need. When should you call for help? Watch closely for changes in your health, and be sure to contact your doctor if you have any problems. Where can you learn more? Go to http://www.gray.com/  Enter M421 in the search box to learn more about \"Pelvic Exam: Care Instructions. \"  Current as of: February 11, 2021               Content Version: 13.0  © 3945-5768 [employer]. Care instructions adapted under license by [employer] (which disclaims liability or warranty for this information). If you have questions about a medical condition or this instruction, always ask your healthcare professional. [place] [address] any warranty or liability for your use of this information.

## 2021-11-08 NOTE — PROGRESS NOTES
Cynthia Leo is a 42 y.o. female who complains of heavy bleeding. She is having two episodes of bleeding per month. One episode is heavy with clots, the other is brown and but still requires tampons. She states she is not currently sexually active. Had endometrial polpectomy as part of fertility issues in the past.  No longer desires childbearing  US shows:  UTERUS IS ANTEVERTED, NORMAL IN SIZE, AND ECHOGENICITY. ENDOMETRIUM MEASURES 6.55 MM IN THICKNESS. THERE APPEARS TO BE A ECHOGENIC AREA WITH  POSTERIOR ENHANCEMENT SEEN WITHIN THE ENDOMETRIUM MEASURING 6 X 5 MM. THE RIGHT OVARY APPEARS WNL. THE LEFT OVARY APPEARS WNL. NO FREE FLUID SEEN IN THE CDS. Her current method of family planning is OCP (estrogen/progesterone). The patient is not sexually active. She developed this problem approximately 2 months ago.        Her relevant past medical history:   Past Medical History:   Diagnosis Date    Anemia     Arthritis     Rt  Knee    Basal cell carcinoma 1971    Complication of anesthesia     decreased HR    Headache     had covid July 2020    Infertility, female     6 rounds of IUI - not for infertility, but no partner    Myocarditis [facility])         Past Surgical History:   Procedure Laterality Date    HX KNEE ARTHROSCOPY Right 07/2000    HX KNEE ARTHROSCOPY Right 07/2000    HX MALIGNANT SKIN LESION EXCISION  12/2019    HX MALIGNANT SKIN LESION EXCISION  10/2019    HX POLYPECTOMY  01/2016    uterine    HX SKIN BIOPSY  2012    Basal cell carcinoma removed from face, nose, L and R arm and abdomen    HX WISDOM TEETH EXTRACTION       Social History     Occupational History    Not on file   Tobacco Use    Smoking status: Never Smoker    Smokeless tobacco: Never Used   Substance and Sexual Activity    Alcohol use: No    Drug use: No    Sexual activity: Not Currently     Partners: Male     Birth control/protection: None     Family History   Problem Relation Age of Onset    Cancer Mother         soft tissue sarcoma    Diabetes Mother     Hypertension Mother     Cancer Father         prostate & skin    Bleeding Prob Brother         blood clot after surgery    Breast Cancer Paternal Grandmother         postmen    Breast Cancer Maternal Aunt         postmen       Allergies   Allergen Reactions    Amoxicillin Other (comments)     Migraines    Anesthetic [Benzocaine-Aloe Darliss Gelineau Other (comments)     ANESTHESIA results in decreased heart rate (in the 30s)  Has happened twice     Prior to Admission medications    Medication Sig Start Date End Date Taking? Authorizing Provider   desog-e.estradioL/e.estradioL (Mircette, 30,) 0.15-0.02 mgx21 /0.01 mg x 5 tab Take 1 Tablet by mouth daily. 4/4/25  Yes Carley Hawkins MD   sertraline (ZOLOFT) 50 mg tablet Take  by mouth daily.    Yes Provider, Historical        Review of Systems - History obtained from the patient  Constitutional: negative for weight loss, fever, night sweats  HEENT: negative for hearing loss, earache, congestion, snoring, sorethroat  CV: negative for chest pain, palpitations, edema  Resp: negative for cough, shortness of breath, wheezing  Breast: negative for breast lumps, nipple discharge, galactorrhea  GI: negative for change in bowel habits, abdominal pain, black or bloody stools  : negative for frequency, dysuria, hematuria  MSK: negative for back pain, joint pain, muscle pain  Skin: negative for itching, rash, hives  Neuro: negative for dizziness, headache, confusion, weakness  Psych: negative for anxiety, depression, change in mood  Heme/lymph: negative for bleeding, bruising, pallor      Objective:  Visit Vitals  /80   Wt 179 lb (81.2 kg)   BMI 28.89 kg/m²          PHYSICAL EXAMINATION    Constitutional  · Appearance: well-nourished, well developed, alert, in no acute distress    HENT  · Head and Face: appears normal      Gastrointestinal  · Abdominal Examination: abdomen non-tender to palpation, normal bowel sounds, no masses present  · Liver and spleen: no hepatomegaly present, spleen not palpable  · Hernias: no hernias identified    Genitourinary  · External Genitalia: normal appearance for age, no discharge present, no tenderness present, no inflammatory lesions present, no masses present, no atrophy present  · Vagina: normal vaginal vault without central or paravaginal defects, no discharge present, no inflammatory lesions present, no masses present  · Bladder: non-tender to palpation  · Urethra: appears normal  · Cervix: normal   · Uterus: normal size, shape and consistency  · Adnexa: no adnexal tenderness present, no adnexal masses present  · Perineum: perineum within normal limits, no evidence of trauma, no rashes or skin lesions present  · Anus: anus within normal limits, no hemorrhoids present  · Inguinal Lymph Nodes: no lymphadenopathy present    Skin  · General Inspection: no rash, no lesions identified    Neurologic/Psychiatric  · Mental Status:  · Orientation: grossly oriented to person, place and time  · Mood and Affect: mood normal, affect appropriate  Procedure note: Endometrial biopsy    Tanya Tubbs is a ,  42 y.o. female WHITE/NON- No LMP recorded. The patient has a history of The encounter diagnosis was DUB (dysfunctional uterine bleeding). and presents for an endometrial biopsy. Indications:   After the indications, risks, benefits, and alternatives to performing an endometrial biopsy were explained to the patient, her questions were answered and informed consent was obtained. Procedure: The patient was placed on the table in the dorsal lithotomy position. A bimanual exam showed the uterus to be anterior. The uterus was not enlarged. A speculum was placed in the vagina. The cervix was visualized and prepped with betadine. An Allis tenaculum was   placed on the anterior lip of the cervix for traction. It was   necessary to dilate the cervix.    A pipelle was passed through the endocervical canal without difficulty. The uterus was sounded to 8 cm's. A moderate amount of tissue was returned. This tissue was placed in formalin and sent to pathology. It was felt that an adequate sample was obtained. The patient tolerated the procedure well and she reported mild cramping. The tenaculum and speculum were removed. Post Procedural Status: The patient was observed for 2 minutes after the procedure. She had mild cramping at the time of discharge. There were no complications. The patient was discharged in stable condition. [facility]  OFFICE PROCEDURE PROGRESS NOTE        Chart reviewed for the following:   Shamika Correa MD, have reviewed the History, Physical and updated the Allergic reactions for Kooli 11 performed immediately prior to start of procedure:   Shamika Correa MD, have performed the following reviews on Dennise Perera prior to the start of the procedure:            * Patient was identified by name and date of birth   * Agreement on procedure being performed was verified  * Risks and Benefits explained to the patient  * Procedure site verified and marked as necessary  * Patient was positioned for comfort  * Consent was signed and verified     Time: 945      Date of procedure: 11/8/2021    Procedure performed by:  Lizz Diaz MD    Provider assisted by: Armani Blanco RN    Patient assisted by: self    How tolerated by patient: tolerated the procedure well with no complications    Post Procedural Pain Scale: 0 - No Hurt    Comments: none          Assessment:    DUB  Likely endometrial polyp or intracavitary fibroid    Plan:   pipelle done  Desires to proceed w Myosure polypectomy or fibroid removal followed by Novasure endometrial ablation.   Patient was fully  counseled concerning risks of surgery include bleeding, transfusion, infection, readmission, abscess drainage, injury to abdominal organs including bowel, bladder, ureters, vessels, nerves, need for additional surgery, injury may not be recognized at time of surgery, and risk of death. Literature shared       Patient declines presence of chaperone during today's visit.

## 2021-11-09 ENCOUNTER — OFFICE VISIT (OUTPATIENT)
Dept: NEUROLOGY | Age: 46
End: 2021-11-09
Payer: COMMERCIAL

## 2021-11-09 ENCOUNTER — PATIENT OUTREACH (OUTPATIENT)
Dept: OTHER | Age: 46
End: 2021-11-09

## 2021-11-09 VITALS
DIASTOLIC BLOOD PRESSURE: 80 MMHG | HEART RATE: 71 BPM | RESPIRATION RATE: 18 BRPM | HEIGHT: 72 IN | WEIGHT: 179 LBS | TEMPERATURE: 97.3 F | OXYGEN SATURATION: 98 % | SYSTOLIC BLOOD PRESSURE: 110 MMHG | BODY MASS INDEX: 24.24 KG/M2

## 2021-11-09 DIAGNOSIS — G43.009 MIGRAINE WITHOUT AURA AND WITHOUT STATUS MIGRAINOSUS, NOT INTRACTABLE: Primary | ICD-10-CM

## 2021-11-09 DIAGNOSIS — F41.9 ANXIETY DISORDER, UNSPECIFIED TYPE: ICD-10-CM

## 2021-11-09 PROCEDURE — 99214 OFFICE O/P EST MOD 30 MIN: CPT | Performed by: PSYCHIATRY & NEUROLOGY

## 2021-11-09 RX ORDER — UBROGEPANT 50 MG/1
TABLET ORAL
COMMUNITY
Start: 2021-08-06 | End: 2022-06-10 | Stop reason: ALTCHOICE

## 2021-11-09 RX ORDER — PROMETHAZINE HYDROCHLORIDE 25 MG/1
TABLET ORAL
COMMUNITY
End: 2021-11-23

## 2021-11-09 NOTE — PROGRESS NOTES
[facility] Outreach     Call placed to pt, Verified  and address for HIPAA security. Purpose of TC    Pt was seen at [facility] . Diagnosis: Palpitations    TC details    Pt reported palpitations resolved  Pt reported she continues to see mental health provider for anxiety, beneficial.   Pt reported she saw her OBGYN recently was diagnosed with fibroids and will require surgery in the near future    Plan of action    F/U with pt in 3 weeks   Confirm surgery date   Provide support to patient. Goals      Attends follow-up appointments as directed. PCP - TBD  ENT - TBD  RTW - 21 pt was seen at [facility] 21 for CP  PCP - TBD, provider on vacation   ENT - attended appt F/U TBD  Cardio - TBD, pt waiting on return call due to provider on vacation     21  Pt was seen again at [facility] . Diagnosis: Palpitations  PCP - pt left a message TBD  Cardio - attended appt today 21 F/U 21  Echo - 21  GI - attended appt 21 F/U Aug   Holter monitor - wearing X 14 days     21 Call placed to patient, no answer. Voicemail left to return call. 21   PCP - 21  Neuro - 21  Mental health counselor Luis Loge - weekly zoom  Be Well - today   RTW - 9/10/21 - DAVID due to stress/panic attacks    10/7/21 Call placed to patient, no answer. Voicemail left to return call. 21  PCP - 21 F/U TBD   Neuro - 21 F/U 21   Mental health counselor Luis Loge - weekly zoom  OBGYN surgery - TBD          Knowledge and adherence to medication plan. Taking prescribed meds including ABX until gone.  Supportive resources in place to maintain patient in the community (ie.  Home Health, DME equipment, refer to, medication assistant plan, etc.)      [facility] - # [phone]   Nurse Access line   Be Well  [address] [phone]  HR Service Now - [facility] Workday  IT - [phone]  [facility] - 1- [phone]  [facility] for advice and direction, by calling [phone] and pressing 1  [facility] - [phone] Go to [place] on the Internet or your mobile device and enter the password [email or web address] to access resources, educational information, and self-service options.  Understands red flags post discharge. You have signs of infection, such as: Increased pain, swelling, warmth, or redness. Red streaks leading from the area. Pus draining from the area. A fever. You have clear fluid draining from your nose. You have vision changes. Your nose is bleeding. You have new or worse pain. 7/12/21 denies CP or SOB, but does report fatigue. Reported staying hydrated. 7/19/21 denies CP, SOB, wheeze, N/V. Pt does report a H/A today and frustrated due to inability to find the cause of s/s. Pt denies consuming caffeine, stimulants, herbal supplements etc.     8/31/21 denies red flags. Reported CP/palpitations were primarily related to stress/panic attacks.            CM will f/u in 3 weeks

## 2021-11-09 NOTE — PROGRESS NOTES
Jorge Luis Harry is a 42 y.o. female  HIPAA verified by two patient identifiers. Health Maintenance Due   Topic    Hepatitis C Screening     Lipid Screen      Chief Complaint   Patient presents with    Migraine     3 month follow up     Visit Vitals  /80   Pulse 71   Temp 97.3 °F (36.3 °C) (Oral)   Resp 18   Ht 6' (1.829 m)   Wt 179 lb (81.2 kg)   LMP 11/06/2021   SpO2 98%   BMI 24.28 kg/m²       Pain Scale: 2/10  Pain Location: Head  1. Have you been to the ER, urgent care clinic since your last visit? Hospitalized since your last visit? No    2. Have you seen or consulted any other health care providers outside of the [address] [place] [address] since your last visit? Include any pap smears or colon screening.  No

## 2021-11-09 NOTE — PROGRESS NOTES
Neurology Clinic Follow up Note    Patient ID:  Dennise Perera  [id number]  42 y.o.  1975      Ms. Sandra Lewis is here for follow up today of  Chief Complaint   Patient presents with    Migraine     3 month follow up          Last Appointment With Me:  8/4/2021    \"Patient reports onset of headache 2-3/2020. Headaches were associated with nausea lasting up to 24h occurring every 2 weeks on average. She was diagnosed with COVID July 2020 with worsening of headaches. Location: Bi-temporal, R alton-orbital  Character: Pulsation  Intensity: On average 3-7/10  Frequency: 1-2x weekly  # HA free days per month: 20  Duration: 24h  Aura: None  Associated Sx with HA: no nausea/vomiting, no phonophotophobia. Neurological ROS: Denies focal weakness, numbness or vision loss associated with headaches  Systemic ROS:   No h/o snoring  Caffeine use: None  H/O Head trauma: None  Depressive or anxiety Sx: None    Any change in pattern of HA? See above    Triggers: None. No worsening reported with cough/sneeze, bending over  Alleviating factors: N/A  FHx HA/migraine: None\"    Interval History:   Headaches are doing much better since last visit after starting Zoloft for anxiety. She reports only 4 severe headaches, unilateral L>R periorbital, no N/V, +photophobia. These typically respond well to OTC analgesics. She has not required Meghna Moots yet but has this at home if needed for more severe headaches. She reports sporadic mild headaches triggered by irregular meals. She overall feels things are better on Zoloft including her anxiety. This was primarily triggered by concerns regarding the [place] pandemic. PMHx/ PSHx/ FHx/ SHx:  Reviewed and unchanged previous visit.    Past Medical History:   Diagnosis Date    Anemia     Arthritis     Rt  Knee    Basal cell carcinoma 2763    Complication of anesthesia     decreased HR    Headache     had covid July 2020    Infertility, female     6 rounds of IUI - not for infertility, but no partner    Myocarditis ([facility] [address]          ROS:  Comprehensive review of systems negative except for as noted above. Objective:       Meds:  Current Outpatient Medications   Medication Sig Dispense Refill    promethazine (PHENERGAN) 25 mg tablet promethazine 25 mg tablet   TAKE 1 PILL 3 4 TIME A DAY AS NEEDED FOR NAUSEA 5 DAYS      Ubrelvy 50 mg tablet TAKE 1 TABLET BY MOUTH 1 TIME FOR UP TO 1 DOSE AS NEEDED FOR MIGRAINE      desog-e.estradioL/e.estradioL ([place], 28,) 0.15-0.02 mgx21 /0.01 mg x 5 tab Take 1 Tablet by mouth daily. 3 Package 4    sertraline (ZOLOFT) 50 mg tablet Take  by mouth daily. Exam:  Visit Vitals  /80   Pulse 71   Temp 97.3 °F (36.3 °C) (Oral)   Resp 18   Ht 6' (1.829 m)   Wt 179 lb (81.2 kg)   LMP 11/06/2021   SpO2 98%   BMI 24.28 kg/m²     NEUROLOGICAL EXAM:  General: Awake, alert, speech fluent  CN: PERRL, EOMI without nystagmus, VFF to confrontation, facial sensation and strength are normal and symmetric, hearing is intact to finger rub bilaterally, palate and tongue movements are intact and symmetric. Motor: Normal tone, bulk and strength bilaterally. Reflexes: Deferred  Coordination: FNF, MANDIE, HTS intact. Sensation: LT intact throughout. Gait: Normal-based and steady. LABS  Results for orders placed or performed in visit on 08/31/21   [facility] SCREEN   Result Value Ref Range    Glucose 84 65 - 100 mg/dL    Cholesterol, total 173 <200 MG/DL    HDL Cholesterol 77 MG/DL    LDL, calculated 74 0 - 100 MG/DL    Triglyceride 110 <150 MG/DL       IMAGING:  CT Results (most recent):  Results from Hospital Encounter encounter on 06/25/21    CT MAXILLOFACIAL WO CONT    Narrative  EXAM: CT MAXILLOFACIAL WO CONT    INDICATION: nasal/ facial injury    COMPARISON: None. CONTRAST:   None. TECHNIQUE:  Multislice helical CT of the facial bones was performed in the axial  plane without intravenous contrast administration.  Coronal and sagittal  reformations were generated. CT dose reduction was achieved through use of a  standardized protocol tailored for this examination and automatic exposure  control for dose modulation. FINDINGS:    Bones: Mild nasal soft tissue swelling, with nondisplaced left-sided nasal bone  fracture. No other facial fractures are identified. Paranasal sinuses: Clear. Orbits: The globes, optic nerves, and extraocular muscles are within normal  limits. .    Base of brain and soft tissues: Within normal limits. No evidence of mass. .    Impression  Nondisplaced left-sided nasal bone fracture. No other facial fracture is  identified. MRI Results (maximum last 3): Results from [place] encounter on 12/09/20   MRI BRAIN W WO CONT    Narrative EXAM:  MRI BRAIN W WO CONT, MRI BRAIN MRV WO CONT    INDICATION:    worsening headaches, change in headache character post COVID    COMPARISON:  None. CONTRAST: 15 ml Dotarem. TECHNIQUE:    Multiplanar multisequence acquisition without and with contrast of the brain. Time-of-flight noncontrast MRV of the brain was performed. Multiplanar and MIP  reconstructions were obtained. FINDINGS:  MRI brain:  The ventricles are normal in size and position. Minimal (less than 5) tiny  nonspecific T2/FLAIR white matter hyperintensities, of doubtful clinical  significance. There is no acute infarct, hemorrhage, extra-axial fluid  collection, or mass effect. There is no cerebellar tonsillar herniation. Expected arterial flow-voids are present. No evidence of abnormal enhancement. The paranasal sinuses are clear. Trace bilateral mastoid effusions. The orbital  contents are within normal limits. No significant osseous or scalp lesions are  identified. MRV head: The dural venous sinuses and deep cerebral veins are patent without evidence of  thrombosis. Right dominant transverse sinus, with no significant stenosis of the  transverse sinuses. Impression IMPRESSION:   MRI brain:  1. No acute or significant intracranial abnormality. MRV head:  1. No evidence of venous sinus thrombosis. MRV BRAIN WO CONT    Narrative EXAM:  MRI BRAIN W WO CONT, MRI BRAIN MRV WO CONT    INDICATION:    worsening headaches, change in headache character post COVID    COMPARISON:  None. CONTRAST: 15 ml Dotarem. TECHNIQUE:    Multiplanar multisequence acquisition without and with contrast of the brain. Time-of-flight noncontrast MRV of the brain was performed. Multiplanar and MIP  reconstructions were obtained. FINDINGS:  MRI brain:  The ventricles are normal in size and position. Minimal (less than 5) tiny  nonspecific T2/FLAIR white matter hyperintensities, of doubtful clinical  significance. There is no acute infarct, hemorrhage, extra-axial fluid  collection, or mass effect. There is no cerebellar tonsillar herniation. Expected arterial flow-voids are present. No evidence of abnormal enhancement. The paranasal sinuses are clear. Trace bilateral mastoid effusions. The orbital  contents are within normal limits. No significant osseous or scalp lesions are  identified. MRV head: The dural venous sinuses and deep cerebral veins are patent without evidence of  thrombosis. Right dominant transverse sinus, with no significant stenosis of the  transverse sinuses. Impression IMPRESSION:   MRI brain:  1. No acute or significant intracranial abnormality. MRV head:  1. No evidence of venous sinus thrombosis. Assessment:     Encounter Diagnoses     ICD-10-CM ICD-9-CM   1. Migraine without aura and without status migrainosus, not intractable  G43.009 346.10   2. Anxiety disorder, unspecified type  F41.9 27.00     42year old pleasant female here for f/u of episodic migrainous headaches since 2/2020 with interval worsening frequency post COVID 7/2020. She has previously noted an increase in headache frequency associated with worsening anxiety/panic attacks.  MRI Brain/MRV were previously obtained showing no evidence of acute structural pathology, mass or venous sinus thrombosis. Minimal T2 hyperintensities noted are non-specific and possible related to headache given lack of vascular risk factors. CT head was recently completed during ED evaluation 8/3/21 with headache and symptoms concerning for panic attack showing no acute intracranial pathology, L temporoparietal bony density c/w possible tiny meningioma. MRI Brain WWO was repeated 8/21/21 and again without acute intracranial pathology or mass. Migraines and anxiety disorder appear to be much improved with initiation of sertraline. Will continue current treatment. She may use Ubrelvy PRN for migraine rescue therapy. PLAN:  Continue sertraline for anxiety and migraine prophylaxis  May continue Ubrelvy 50mg PRN for migraine rescue therapy      Follow-up and Dispositions    · Return in about 6 months (around 5/9/2022). I have discussed the diagnosis with the patient today and the intended plan as seen in the above orders with both the patient as well as referring provider and/or PCP via electronic correspondence. The patient has received an after-visit summary and questions were answered concerning future plans.     Signed:  Howie Valencia DO  11/9/2021

## 2021-11-12 NOTE — PERIOP NOTES
PATIENT CALLED AND MADE AWARE OF COVID-19 TESTING NEEDED TO BE DONE PRIOR TO SURGERY. COVID-19 TESTING APPOINTMENT MADE FOR PATIENT. PATIENT INSTRUCTED ON SELF QUARANTINE BETWEEN TESTING AND ARRIVAL TIME DAY OF SURGERY.         APPOINTMENT MADE FOR COVID TESTING ON 11/24/21 08

## 2021-11-15 ENCOUNTER — TRANSCRIBE ORDER (OUTPATIENT)
Dept: REGISTRATION | Age: 46
End: 2021-11-15

## 2021-11-15 DIAGNOSIS — N93.8 DUB (DYSFUNCTIONAL UTERINE BLEEDING): Primary | ICD-10-CM

## 2021-11-15 DIAGNOSIS — Z01.812 PRE-PROCEDURE LAB EXAM: Primary | ICD-10-CM

## 2021-11-23 RX ORDER — PANTOPRAZOLE SODIUM 40 MG/1
40 TABLET, DELAYED RELEASE ORAL DAILY
COMMUNITY

## 2021-11-24 ENCOUNTER — ANESTHESIA EVENT (OUTPATIENT)
Dept: SURGERY | Age: 46
End: 2021-11-24
Payer: COMMERCIAL

## 2021-11-24 ENCOUNTER — HOSPITAL ENCOUNTER (OUTPATIENT)
Dept: PREADMISSION TESTING | Age: 46
Discharge: HOME | End: 2021-11-24
Attending: OBSTETRICS & GYNECOLOGY
Payer: COMMERCIAL

## 2021-11-24 DIAGNOSIS — Z01.812 PRE-PROCEDURE LAB EXAM: ICD-10-CM

## 2021-11-24 PROCEDURE — U0005 INFEC AGEN DETEC AMPLI PROBE: HCPCS

## 2021-11-25 LAB
SARS-COV-2, XPLCVT: NOT DETECTED
SOURCE, COVRS: NORMAL

## 2021-11-29 ENCOUNTER — HOSPITAL ENCOUNTER (OUTPATIENT)
Age: 46
Setting detail: OUTPATIENT SURGERY
Discharge: HOME | End: 2021-11-29
Attending: OBSTETRICS & GYNECOLOGY | Admitting: OBSTETRICS & GYNECOLOGY
Payer: COMMERCIAL

## 2021-11-29 ENCOUNTER — ANESTHESIA (OUTPATIENT)
Dept: SURGERY | Age: 46
End: 2021-11-29
Payer: COMMERCIAL

## 2021-11-29 VITALS
SYSTOLIC BLOOD PRESSURE: 123 MMHG | HEART RATE: 50 BPM | DIASTOLIC BLOOD PRESSURE: 82 MMHG | RESPIRATION RATE: 14 BRPM | TEMPERATURE: 98.4 F | OXYGEN SATURATION: 99 % | HEIGHT: 72 IN | WEIGHT: 179.9 LBS | BODY MASS INDEX: 24.37 KG/M2

## 2021-11-29 DIAGNOSIS — N93.8 DYSFUNCTIONAL UTERINE BLEEDING: Primary | ICD-10-CM

## 2021-11-29 DIAGNOSIS — N93.8 DUB (DYSFUNCTIONAL UTERINE BLEEDING): ICD-10-CM

## 2021-11-29 LAB
ERYTHROCYTE [DISTWIDTH] IN BLOOD BY AUTOMATED COUNT: 14 % (ref 11.5–14.5)
HCG UR QL: NEGATIVE
HCT VFR BLD AUTO: 30.6 % (ref 35–47)
HGB BLD-MCNC: 9.7 G/DL (ref 11.5–16)
MCH RBC QN AUTO: 24.9 PG (ref 26–34)
MCHC RBC AUTO-ENTMCNC: 31.7 G/DL (ref 30–36.5)
MCV RBC AUTO: 78.5 FL (ref 80–99)
NRBC # BLD: 0 K/UL (ref 0–0.01)
NRBC BLD-RTO: 0 PER 100 WBC
PLATELET # BLD AUTO: 267 K/UL (ref 150–400)
PMV BLD AUTO: 9.1 FL (ref 8.9–12.9)
RBC # BLD AUTO: 3.9 M/UL (ref 3.8–5.2)
WBC # BLD AUTO: 4.7 K/UL (ref 3.6–11)

## 2021-11-29 PROCEDURE — 81025 URINE PREGNANCY TEST: CPT

## 2021-11-29 PROCEDURE — 2709999900 HC NON-CHARGEABLE SUPPLY: Performed by: OBSTETRICS & GYNECOLOGY

## 2021-11-29 PROCEDURE — 77030040361 HC SLV COMPR DVT MDII -B: Performed by: OBSTETRICS & GYNECOLOGY

## 2021-11-29 PROCEDURE — 77030040922 HC BLNKT HYPOTHRM STRY -A

## 2021-11-29 PROCEDURE — 36415 COLL VENOUS BLD VENIPUNCTURE: CPT

## 2021-11-29 PROCEDURE — 74011250636 HC RX REV CODE- 250/636: Performed by: ANESTHESIOLOGY

## 2021-11-29 PROCEDURE — 74011000250 HC RX REV CODE- 250: Performed by: ANESTHESIOLOGY

## 2021-11-29 PROCEDURE — 76210000016 HC OR PH I REC 1 TO 1.5 HR: Performed by: OBSTETRICS & GYNECOLOGY

## 2021-11-29 PROCEDURE — 76010000138 HC OR TIME 0.5 TO 1 HR: Performed by: OBSTETRICS & GYNECOLOGY

## 2021-11-29 PROCEDURE — 77030037912 HC DEV UTER SURSND KT HOLO -I2: Performed by: OBSTETRICS & GYNECOLOGY

## 2021-11-29 PROCEDURE — 74011250636 HC RX REV CODE- 250/636: Performed by: NURSE ANESTHETIST, CERTIFIED REGISTERED

## 2021-11-29 PROCEDURE — 88305 TISSUE EXAM BY PATHOLOGIST: CPT

## 2021-11-29 PROCEDURE — 74011250637 HC RX REV CODE- 250/637: Performed by: ANESTHESIOLOGY

## 2021-11-29 PROCEDURE — 77030041423 HC SYST FLUID MNGMT FLUENT HOLO -D: Performed by: OBSTETRICS & GYNECOLOGY

## 2021-11-29 PROCEDURE — 74011000250 HC RX REV CODE- 250: Performed by: OBSTETRICS & GYNECOLOGY

## 2021-11-29 PROCEDURE — 85027 COMPLETE CBC AUTOMATED: CPT

## 2021-11-29 PROCEDURE — 76060000032 HC ANESTHESIA 0.5 TO 1 HR: Performed by: OBSTETRICS & GYNECOLOGY

## 2021-11-29 PROCEDURE — 58563 HYSTEROSCOPY ABLATION: CPT | Performed by: OBSTETRICS & GYNECOLOGY

## 2021-11-29 PROCEDURE — 77030003666 HC NDL SPINAL BD -A: Performed by: OBSTETRICS & GYNECOLOGY

## 2021-11-29 RX ORDER — FENTANYL CITRATE 50 UG/ML
25 INJECTION, SOLUTION INTRAMUSCULAR; INTRAVENOUS
Status: DISCONTINUED | OUTPATIENT
Start: 2021-11-29 | End: 2021-11-29 | Stop reason: HOSPADM

## 2021-11-29 RX ORDER — MIDAZOLAM HYDROCHLORIDE 1 MG/ML
INJECTION, SOLUTION INTRAMUSCULAR; INTRAVENOUS AS NEEDED
Status: DISCONTINUED | OUTPATIENT
Start: 2021-11-29 | End: 2021-11-29 | Stop reason: HOSPADM

## 2021-11-29 RX ORDER — SODIUM CHLORIDE 0.9 % (FLUSH) 0.9 %
5-40 SYRINGE (ML) INJECTION EVERY 8 HOURS
Status: DISCONTINUED | OUTPATIENT
Start: 2021-11-29 | End: 2021-11-29 | Stop reason: HOSPADM

## 2021-11-29 RX ORDER — SODIUM CHLORIDE 0.9 % (FLUSH) 0.9 %
5-40 SYRINGE (ML) INJECTION AS NEEDED
Status: DISCONTINUED | OUTPATIENT
Start: 2021-11-29 | End: 2021-11-29 | Stop reason: HOSPADM

## 2021-11-29 RX ORDER — KETOROLAC TROMETHAMINE 10 MG/1
10 TABLET, FILM COATED ORAL
Qty: 5 TABLET | Refills: 0 | Status: SHIPPED | OUTPATIENT
Start: 2021-11-29 | End: 2021-12-07

## 2021-11-29 RX ORDER — FENTANYL CITRATE 50 UG/ML
50 INJECTION, SOLUTION INTRAMUSCULAR; INTRAVENOUS AS NEEDED
Status: CANCELLED | OUTPATIENT
Start: 2021-11-29

## 2021-11-29 RX ORDER — LIDOCAINE HYDROCHLORIDE AND EPINEPHRINE 10; 10 MG/ML; UG/ML
INJECTION, SOLUTION INFILTRATION; PERINEURAL AS NEEDED
Status: DISCONTINUED | OUTPATIENT
Start: 2021-11-29 | End: 2021-11-29 | Stop reason: HOSPADM

## 2021-11-29 RX ORDER — MIDAZOLAM HYDROCHLORIDE 1 MG/ML
1 INJECTION, SOLUTION INTRAMUSCULAR; INTRAVENOUS AS NEEDED
Status: CANCELLED | OUTPATIENT
Start: 2021-11-29

## 2021-11-29 RX ORDER — SODIUM CHLORIDE, SODIUM LACTATE, POTASSIUM CHLORIDE, CALCIUM CHLORIDE 600; 310; 30; 20 MG/100ML; MG/100ML; MG/100ML; MG/100ML
50 INJECTION, SOLUTION INTRAVENOUS CONTINUOUS
Status: DISCONTINUED | OUTPATIENT
Start: 2021-11-29 | End: 2021-11-29 | Stop reason: HOSPADM

## 2021-11-29 RX ORDER — ACETAMINOPHEN 325 MG/1
650 TABLET ORAL ONCE
Status: CANCELLED | OUTPATIENT
Start: 2021-11-29 | End: 2021-11-29

## 2021-11-29 RX ORDER — SODIUM CHLORIDE, SODIUM LACTATE, POTASSIUM CHLORIDE, CALCIUM CHLORIDE 600; 310; 30; 20 MG/100ML; MG/100ML; MG/100ML; MG/100ML
INJECTION, SOLUTION INTRAVENOUS
Status: DISCONTINUED | OUTPATIENT
Start: 2021-11-29 | End: 2021-11-29 | Stop reason: HOSPADM

## 2021-11-29 RX ORDER — FENTANYL CITRATE 50 UG/ML
INJECTION, SOLUTION INTRAMUSCULAR; INTRAVENOUS AS NEEDED
Status: DISCONTINUED | OUTPATIENT
Start: 2021-11-29 | End: 2021-11-29 | Stop reason: HOSPADM

## 2021-11-29 RX ORDER — ONDANSETRON 2 MG/ML
INJECTION INTRAMUSCULAR; INTRAVENOUS AS NEEDED
Status: DISCONTINUED | OUTPATIENT
Start: 2021-11-29 | End: 2021-11-29 | Stop reason: HOSPADM

## 2021-11-29 RX ORDER — HYDROMORPHONE HYDROCHLORIDE 1 MG/ML
0.2 INJECTION, SOLUTION INTRAMUSCULAR; INTRAVENOUS; SUBCUTANEOUS
Status: DISCONTINUED | OUTPATIENT
Start: 2021-11-29 | End: 2021-11-29 | Stop reason: HOSPADM

## 2021-11-29 RX ORDER — SODIUM CHLORIDE 0.9 % (FLUSH) 0.9 %
5-40 SYRINGE (ML) INJECTION AS NEEDED
Status: CANCELLED | OUTPATIENT
Start: 2021-11-29

## 2021-11-29 RX ORDER — DEXAMETHASONE SODIUM PHOSPHATE 4 MG/ML
INJECTION, SOLUTION INTRA-ARTICULAR; INTRALESIONAL; INTRAMUSCULAR; INTRAVENOUS; SOFT TISSUE AS NEEDED
Status: DISCONTINUED | OUTPATIENT
Start: 2021-11-29 | End: 2021-11-29 | Stop reason: HOSPADM

## 2021-11-29 RX ORDER — SODIUM CHLORIDE, SODIUM LACTATE, POTASSIUM CHLORIDE, CALCIUM CHLORIDE 600; 310; 30; 20 MG/100ML; MG/100ML; MG/100ML; MG/100ML
50 INJECTION, SOLUTION INTRAVENOUS CONTINUOUS
Status: CANCELLED | OUTPATIENT
Start: 2021-11-29 | End: 2021-11-30

## 2021-11-29 RX ORDER — FENTANYL CITRATE 50 UG/ML
50 INJECTION, SOLUTION INTRAMUSCULAR; INTRAVENOUS AS NEEDED
Status: DISCONTINUED | OUTPATIENT
Start: 2021-11-29 | End: 2021-11-29 | Stop reason: HOSPADM

## 2021-11-29 RX ORDER — SODIUM CHLORIDE 0.9 % (FLUSH) 0.9 %
5-40 SYRINGE (ML) INJECTION EVERY 8 HOURS
Status: CANCELLED | OUTPATIENT
Start: 2021-11-29

## 2021-11-29 RX ORDER — ONDANSETRON 2 MG/ML
4 INJECTION INTRAMUSCULAR; INTRAVENOUS AS NEEDED
Status: DISCONTINUED | OUTPATIENT
Start: 2021-11-29 | End: 2021-11-29 | Stop reason: HOSPADM

## 2021-11-29 RX ORDER — LIDOCAINE HYDROCHLORIDE 10 MG/ML
0.1 INJECTION, SOLUTION EPIDURAL; INFILTRATION; INTRACAUDAL; PERINEURAL AS NEEDED
Status: DISCONTINUED | OUTPATIENT
Start: 2021-11-29 | End: 2021-11-29 | Stop reason: HOSPADM

## 2021-11-29 RX ORDER — GLYCOPYRROLATE 0.2 MG/ML
INJECTION INTRAMUSCULAR; INTRAVENOUS AS NEEDED
Status: DISCONTINUED | OUTPATIENT
Start: 2021-11-29 | End: 2021-11-29 | Stop reason: HOSPADM

## 2021-11-29 RX ORDER — KETAMINE HYDROCHLORIDE 10 MG/ML
INJECTION, SOLUTION INTRAMUSCULAR; INTRAVENOUS AS NEEDED
Status: DISCONTINUED | OUTPATIENT
Start: 2021-11-29 | End: 2021-11-29 | Stop reason: HOSPADM

## 2021-11-29 RX ORDER — PROPOFOL 10 MG/ML
INJECTION, EMULSION INTRAVENOUS AS NEEDED
Status: DISCONTINUED | OUTPATIENT
Start: 2021-11-29 | End: 2021-11-29 | Stop reason: HOSPADM

## 2021-11-29 RX ORDER — ACETAMINOPHEN 325 MG/1
650 TABLET ORAL ONCE
Status: COMPLETED | OUTPATIENT
Start: 2021-11-29 | End: 2021-11-29

## 2021-11-29 RX ORDER — OXYCODONE AND ACETAMINOPHEN 5; 325 MG/1; MG/1
1 TABLET ORAL
Qty: 5 TABLET | Refills: 0 | Status: SHIPPED | OUTPATIENT
Start: 2021-11-29 | End: 2021-12-01

## 2021-11-29 RX ORDER — PROPOFOL 10 MG/ML
INJECTION, EMULSION INTRAVENOUS
Status: DISCONTINUED | OUTPATIENT
Start: 2021-11-29 | End: 2021-11-29 | Stop reason: HOSPADM

## 2021-11-29 RX ORDER — KETOROLAC TROMETHAMINE 30 MG/ML
INJECTION, SOLUTION INTRAMUSCULAR; INTRAVENOUS AS NEEDED
Status: DISCONTINUED | OUTPATIENT
Start: 2021-11-29 | End: 2021-11-29 | Stop reason: HOSPADM

## 2021-11-29 RX ORDER — LIDOCAINE HYDROCHLORIDE 20 MG/ML
INJECTION, SOLUTION EPIDURAL; INFILTRATION; INTRACAUDAL; PERINEURAL AS NEEDED
Status: DISCONTINUED | OUTPATIENT
Start: 2021-11-29 | End: 2021-11-29 | Stop reason: HOSPADM

## 2021-11-29 RX ORDER — LIDOCAINE HYDROCHLORIDE 10 MG/ML
0.1 INJECTION, SOLUTION EPIDURAL; INFILTRATION; INTRACAUDAL; PERINEURAL AS NEEDED
Status: CANCELLED | OUTPATIENT
Start: 2021-11-29

## 2021-11-29 RX ORDER — MIDAZOLAM HYDROCHLORIDE 1 MG/ML
1 INJECTION, SOLUTION INTRAMUSCULAR; INTRAVENOUS AS NEEDED
Status: DISCONTINUED | OUTPATIENT
Start: 2021-11-29 | End: 2021-11-29 | Stop reason: HOSPADM

## 2021-11-29 RX ADMIN — FENTANYL CITRATE 25 MCG: 50 INJECTION, SOLUTION INTRAMUSCULAR; INTRAVENOUS at 07:34

## 2021-11-29 RX ADMIN — PROPOFOL 50 MG: 10 INJECTION, EMULSION INTRAVENOUS at 07:34

## 2021-11-29 RX ADMIN — FENTANYL CITRATE 25 MCG: 0.05 INJECTION, SOLUTION INTRAMUSCULAR; INTRAVENOUS at 08:49

## 2021-11-29 RX ADMIN — ONDANSETRON HYDROCHLORIDE 4 MG: 2 SOLUTION INTRAMUSCULAR; INTRAVENOUS at 08:45

## 2021-11-29 RX ADMIN — MIDAZOLAM 2 MG: 1 INJECTION INTRAMUSCULAR; INTRAVENOUS at 07:27

## 2021-11-29 RX ADMIN — PROPOFOL 20 MG: 10 INJECTION, EMULSION INTRAVENOUS at 07:57

## 2021-11-29 RX ADMIN — ONDANSETRON HYDROCHLORIDE 4 MG: 2 INJECTION, SOLUTION INTRAMUSCULAR; INTRAVENOUS at 07:46

## 2021-11-29 RX ADMIN — PROPOFOL 20 MG: 10 INJECTION, EMULSION INTRAVENOUS at 07:58

## 2021-11-29 RX ADMIN — DEXAMETHASONE SODIUM PHOSPHATE 4 MG: 4 INJECTION, SOLUTION INTRAMUSCULAR; INTRAVENOUS at 07:40

## 2021-11-29 RX ADMIN — PROPOFOL 30 MG: 10 INJECTION, EMULSION INTRAVENOUS at 07:50

## 2021-11-29 RX ADMIN — SODIUM CHLORIDE, POTASSIUM CHLORIDE, SODIUM LACTATE AND CALCIUM CHLORIDE 50 ML/HR: 600; 310; 30; 20 INJECTION, SOLUTION INTRAVENOUS at 07:14

## 2021-11-29 RX ADMIN — PROPOFOL 75 MCG/KG/MIN: 10 INJECTION, EMULSION INTRAVENOUS at 07:36

## 2021-11-29 RX ADMIN — KETOROLAC TROMETHAMINE 30 MG: 30 INJECTION, SOLUTION INTRAMUSCULAR; INTRAVENOUS at 08:05

## 2021-11-29 RX ADMIN — ACETAMINOPHEN 650 MG: 325 TABLET ORAL at 07:14

## 2021-11-29 RX ADMIN — KETAMINE HYDROCHLORIDE 10 MG: 10 INJECTION, SOLUTION INTRAMUSCULAR; INTRAVENOUS at 07:45

## 2021-11-29 RX ADMIN — KETAMINE HYDROCHLORIDE 10 MG: 10 INJECTION, SOLUTION INTRAMUSCULAR; INTRAVENOUS at 08:00

## 2021-11-29 RX ADMIN — GLYCOPYRROLATE 0.2 MG: 0.2 INJECTION, SOLUTION INTRAMUSCULAR; INTRAVENOUS at 07:27

## 2021-11-29 RX ADMIN — FENTANYL CITRATE 25 MCG: 50 INJECTION, SOLUTION INTRAMUSCULAR; INTRAVENOUS at 07:49

## 2021-11-29 RX ADMIN — LIDOCAINE HYDROCHLORIDE 80 MG: 20 INJECTION, SOLUTION EPIDURAL; INFILTRATION; INTRACAUDAL; PERINEURAL at 07:34

## 2021-11-29 RX ADMIN — FENTANYL CITRATE 25 MCG: 0.05 INJECTION, SOLUTION INTRAMUSCULAR; INTRAVENOUS at 08:58

## 2021-11-29 RX ADMIN — PROPOFOL 50 MG: 10 INJECTION, EMULSION INTRAVENOUS at 08:01

## 2021-11-29 RX ADMIN — SODIUM CHLORIDE, POTASSIUM CHLORIDE, SODIUM LACTATE AND CALCIUM CHLORIDE: 600; 310; 30; 20 INJECTION, SOLUTION INTRAVENOUS at 07:26

## 2021-11-29 NOTE — ROUTINE PROCESS
Patient: Breanne Kaur MRN: [id number]  SSN: [id number]   YOB: 1975  Age: 42 y.o. Sex: female     Patient is status post Procedure(s): HYSTEROSCOPY/DILATATION AND CURETTAGE/MYOMECTOMY, POLYPECTOMY WITH MYOSURE, NOVASURE ABLATION.     Surgeon(s) and Role:     * Unknown MD Elida - Primary    Local/Dose/Irrigation:  8ML 1% LIDOCAINE WITH EPI 1:100,00               Peripheral IV 11/29/21 Distal; Left; Posterior Forearm (Active)                           Dressing/Packing:  MICHOACANO PAD

## 2021-11-29 NOTE — ANESTHESIA POSTPROCEDURE EVALUATION
Post-Anesthesia Evaluation and Assessment    Patient: Naga Montoya MRN: [id number]  SSN: [id number]    YOB: 1975  Age: 42 y.o. Sex: female      I have evaluated the patient and they are stable and ready for discharge from the PACU. Cardiovascular Function/Vital Signs  Visit Vitals  /71 (BP 1 Location: Left upper arm, BP Patient Position: At rest)   Pulse 77   Temp 36.6 °C (97.9 °F)   Resp 15   Ht 6' (1.829 m)   Wt 81.6 kg (179 lb 14.3 oz)   SpO2 100%   BMI 24.40 kg/m²       Patient is status post General anesthesia for Procedure(s): HYSTEROSCOPY/DILATATION AND CURETTAGE/MYOMECTOMY, POLYPECTOMY WITH MYOSURE, NOVASURE ABLATION. Nausea/Vomiting: None    Postoperative hydration reviewed and adequate. Pain:  Pain Scale 1: Numeric (0 - 10) (11/29/21 0818)  Pain Intensity 1: 0 (11/29/21 0818)   Managed    Neurological Status:   Neuro (WDL): Exceptions to WDL (11/29/21 0818)  Neuro  Neurologic State: Drowsy (11/29/21 0818)  Cognition: Follows commands (11/29/21 0818)  LUE Motor Response: Purposeful (11/29/21 0818)  LLE Motor Response: Purposeful (11/29/21 0818)  RUE Motor Response: Purposeful (11/29/21 0818)  RLE Motor Response: Purposeful (11/29/21 0818)   At baseline    Mental Status, Level of Consciousness: Alert and  oriented to person, place, and time    Pulmonary Status:   O2 Device: CO2 nasal cannula (11/29/21 0818)   Adequate oxygenation and airway patent    Complications related to anesthesia: None    Post-anesthesia assessment completed.  No concerns    Signed By: Margareth Hope MD     November 29, 2021

## 2021-11-29 NOTE — PROGRESS NOTES
9173: Reviewed discharge instructions with patient and Marleta Dates. Caregiver and patient verbalized understanding. Patient ready for discharge. VSS.

## 2021-11-29 NOTE — OP NOTES
Procedure: Diagnostic hysteroscopy,  Myosure polypectomy, dilatation and curettage Novasure endometrial ablation    Preop Dx: Menorrhagia; endometrial polyps    Postop Dx: Same    Surgeon:  Gm Gomez MD    Anesthesia:  MAC with paracervical block of 1%lidocaine w epi    EBL: < 50cc    Comp: none  Fluid deficit:  180cc  Drains:  none  Specimens:  Endometrial  curettings    Indication:  45yo with heavy bleeding and findings on US c/w polyps    Findings:  Several small anterior and posterior uterine wall lesions c/w polyps        After proper patient identification and consent were obtained, the patient was taken to the OR, where after sufficient  anesthesia, she was placed in the dorsal lithotomy position and prepped and draped in the usual fashion. Examination under anesthesia revealed an upper normal size uterus. A speculum was placed in the vagina and a paracervical block was placed at 8, 12 and 4o'clock. The ant lip of the cervix was grasped with an Allis tenaculum. The cervix was gently dilated and yhe hysteroscope was placed to the endocervix and using hydrodissection, the endometrial cavity was entered. The cavity was distended with NS. Both tubal ostia were identified. The cavity appeared as above. Myosure flex was placed into uterine cavity and excision of 4 polyps performed. The hysteroscope was removed. An endometrial curettage was performed. A moderate6 amount of tissue was obtained and sent for pathology. Uterine cavity found to be 6cm and the endometrial width 4.7cm. The Novassure device was entered into the cavity without difficutly and the cavity assessed to be intact after which the device was deployed for 61 seconds at a power of 109. The Novassure was removed. At the end of the procedure hemostasis was noted. The pad, needle and instrument count were correct x 2. The patient was awakened from anesthesia and went to recovery in stable condition.

## 2021-11-29 NOTE — ANESTHESIA PREPROCEDURE EVALUATION
Relevant Problems   No relevant active problems       Anesthetic History     History of awareness of surgery under anesthesia          Review of Systems / Medical History  Patient summary reviewed, nursing notes reviewed and pertinent labs reviewed    Pulmonary  Within defined limits                 Neuro/Psych         Psychiatric history     Cardiovascular  Within defined limits                Exercise tolerance: >4 METS     GI/Hepatic/Renal     GERD          Comments: nausea Endo/Other  Within defined limits      Arthritis     Other Findings              Physical Exam    Airway  Mallampati: II  TM Distance: 4 - 6 cm  Neck ROM: normal range of motion   Mouth opening: Normal     Cardiovascular  Regular rate and rhythm,  S1 and S2 normal,  no murmur, click, rub, or gallop             Dental  No notable dental hx       Pulmonary  Breath sounds clear to auscultation               Abdominal  GI exam deferred       Other Findings            Anesthetic Plan    ASA: 2  Anesthesia type: MAC and general - backup          Induction: Intravenous  Anesthetic plan and risks discussed with: Patient

## 2021-11-29 NOTE — DISCHARGE INSTRUCTIONS
Patient Education        Endometrial Ablation: What to Expect at Home  Your Recovery  Endometrial ablation is a procedure to treat very heavy menstrual bleeding or other abnormal bleeding in the uterus. During ablation, the lining of the uterus is destroyed. The lining heals by scarring. The scarring reduces or prevents bleeding. Your doctor inserted a device into your uterus to destroy the lining. You may have cramps and vaginal bleeding for several days. You may also have watery vaginal discharge mixed with blood for a few days. It may take a few days to 2 weeks to recover. This care sheet gives you a general idea about how long it will take for you to recover. But each person recovers at a different pace. Follow the steps below to feel better as quickly as possible. How can you care for yourself at home? Activity    · Rest when you feel tired. Getting enough sleep will help you recover.     · Most women are able to return to work on the day after the procedure.     · You may shower and take baths as usual.     · Ask your doctor when it is okay for you to have sex. Diet    · You can eat your normal diet. If your stomach is upset, try bland, low-fat foods like plain rice, broiled chicken, toast, and yogurt.     · You may notice that your bowel movements are not regular right after the procedure. This is common. Try to avoid constipation and straining with bowel movements. You may want to take a fiber supplement every day. If you have not had a bowel movement after a couple of days, ask your doctor about taking a mild laxative. Medicines    · Your doctor will tell you if and when you can restart your medicines. He or she will also give you instructions about taking any new medicines.     · If you take aspirin or some other blood thinner, ask your doctor if and when to start taking it again.  Make sure that you understand exactly what your doctor wants you to do.     · Take pain medicines exactly as directed. ? If the doctor gave you a prescription medicine for pain, take it as prescribed. ? If you are not taking a prescription pain medicine, ask your doctor if you can take an over-the-counter medicine.     · If you think your pain medicine is making you sick to your stomach:  ? Take your medicine after meals (unless your doctor has told you not to). ? Ask your doctor for a different pain medicine.     · If your doctor prescribed antibiotics, take them as directed. Do not stop taking them just because you feel better. You need to take the full course of antibiotics. Other instructions    · You may have some light vaginal bleeding. Wear sanitary pads if needed. Do not douche or use tampons until your doctor says it is okay.     · You may want to use a heating pad on your belly to help with pain. Use a low heat setting.     · Talk with your doctor about birth control. Endometrial ablation usually causes infertility, but pregnancy may still be possible. And the pregnancy could have severe problems. Follow-up care is a key part of your treatment and safety. Be sure to make and go to all appointments, and call your doctor if you are having problems. It's also a good idea to know your test results and keep a list of the medicines you take. When should you call for help? Call 911 anytime you think you may need emergency care. For example, call if:    · You passed out (lost consciousness).     · You have chest pain, are short of breath, or cough up blood. Call your doctor now or seek immediate medical care if:    · You have pain that does not get better after you take pain medicine.     · You cannot pass stools or gas.     · You have vaginal discharge that has increased in amount or smells bad.     · You are sick to your stomach or cannot drink fluids.     · You have signs of infection, such as:  ? Increased pain, swelling, warmth, or redness.   ? A fever.     · You have bright red vaginal bleeding that soaks one or more pads in an hour, or you have large clots.     · You have signs of a blood clot in your leg (called a deep vein thrombosis), such as:  ? Pain in your calf, back of the knee, thigh, or groin. ? Redness and swelling in your leg. Watch closely for any changes in your health, and be sure to contact your doctor if you have any problems. Where can you learn more? Go to http://www.gray.com/  Enter Q534 in the search box to learn more about \"Endometrial Ablation: What to Expect at Home. \"  Current as of: February 11, 2021               Content Version: 13.0  © 8028-6387 [employer]. Care instructions adapted under license by [employer] (which disclaims liability or warranty for this information). If you have questions about a medical condition or this instruction, always ask your healthcare professional. [address] any warranty or liability for your use of this information. ______________________________________________________________________    Anesthesia Discharge Instructions    After general anesthesia or intervenous sedation, for 24 hours or while taking prescription Narcotics:  · Limit your activities  · Do not drive or operate hazardous machinery  · If you have not urinated within 8 hours after discharge, please contact your surgeon on call. · Do not make important personal or business decisions  · Do not drink alcoholic beverages    Report the following to your surgeon:  · Excessive pain, swelling, redness or odor of or around the surgical area  · Temperature over 100.5 degrees  · Nausea and vomiting lasting longer than 4 hours or if unable to take medication  · Any signs of decreased circulation or nerve impairment to extremity:  Change in color, persistent numbness, tingling, coldness or increased pain.   · Any questions

## 2021-11-29 NOTE — H&P
Elmer Yarbrough is a 42 y.o. female who complains of heavy bleeding. She is having two episodes of bleeding per month. One episode is heavy with clots, the other is brown and but still requires tampons. She states she is not currently sexually active. Had endometrial polpectomy as part of fertility issues in the past.  No longer desires childbearing  US shows:  UTERUS IS ANTEVERTED, NORMAL IN SIZE, AND ECHOGENICITY. ENDOMETRIUM MEASURES 6.55 MM IN THICKNESS. THERE APPEARS TO BE A ECHOGENIC AREA WITH  POSTERIOR ENHANCEMENT SEEN WITHIN THE ENDOMETRIUM MEASURING 6 X 5 MM. THE RIGHT OVARY APPEARS WNL. THE LEFT OVARY APPEARS WNL. NO FREE FLUID SEEN IN THE CDS.     Her current method of family planning is OCP (estrogen/progesterone). The patient is not sexually active.      She developed this problem approximately 2 months ago.         Her relevant past medical history:        Past Medical History:   Diagnosis Date    Anemia      Arthritis       Rt  Knee    Basal cell carcinoma 6423    Complication of anesthesia       decreased HR    Headache       had covid July 2020    Infertility, female       6 rounds of IUI - not for infertility, but no partner    Myocarditis [facility])                 Past Surgical History:   Procedure Laterality Date    HX KNEE ARTHROSCOPY Right 07/2000    HX KNEE ARTHROSCOPY Right 07/2000    HX MALIGNANT SKIN LESION EXCISION   12/2019    HX MALIGNANT SKIN LESION EXCISION   10/2019    HX POLYPECTOMY   01/2016     uterine    HX SKIN BIOPSY   2012     Basal cell carcinoma removed from face, nose, L and R arm and abdomen    HX WISDOM TEETH EXTRACTION          Social History            Occupational History    Not on file   Tobacco Use    Smoking status: Never Smoker    Smokeless tobacco: Never Used   Substance and Sexual Activity    Alcohol use: No    Drug use: No    Sexual activity: Not Currently       Partners: Male       Birth control/protection: None            Family History Problem Relation Age of Onset    Cancer Mother           soft tissue sarcoma    Diabetes Mother      Hypertension Mother      Cancer Father           prostate & skin    Bleeding Prob Brother           blood clot after surgery    Breast Cancer Paternal Grandmother           postmen    Breast Cancer Maternal Aunt           postmen               Allergies   Allergen Reactions    Amoxicillin Other (comments)       Migraines    Anesthetic [Benzocaine-Aloe Vera] Other (comments)       ANESTHESIA results in decreased heart rate (in the 30s)  Has happened twice              Prior to Admission medications    Medication Sig Start Date End Date Taking? Authorizing Provider   desog-e.estradioL/e.estradioL (Mircette, 28,) 0.15-0.02 mgx21 /0.01 mg x 5 tab Take 1 Tablet by mouth daily.  7/3/24   Yes Enriqueta Alba MD   sertraline (ZOLOFT) 50 mg tablet Take  by mouth daily.     Yes Provider, Historical         Review of Systems - History obtained from the patient  Constitutional: negative for weight loss, fever, night sweats  HEENT: negative for hearing loss, earache, congestion, snoring, sorethroat  CV: negative for chest pain, palpitations, edema  Resp: negative for cough, shortness of breath, wheezing  Breast: negative for breast lumps, nipple discharge, galactorrhea  GI: negative for change in bowel habits, abdominal pain, black or bloody stools  : negative for frequency, dysuria, hematuria  MSK: negative for back pain, joint pain, muscle pain  Skin: negative for itching, rash, hives  Neuro: negative for dizziness, headache, confusion, weakness  Psych: negative for anxiety, depression, change in mood  Heme/lymph: negative for bleeding, bruising, pallor        Objective:  Visit Vitals  /80   Wt 179 lb (81.2 kg)   BMI 28.89 kg/m²            PHYSICAL EXAMINATION     Constitutional  · Appearance: well-nourished, well developed, alert, in no acute distress     HENT  · Head and Face: appears normal        Gastrointestinal  · Abdominal Examination: abdomen non-tender to palpation, normal bowel sounds, no masses present  · Liver and spleen: no hepatomegaly present, spleen not palpable  · Hernias: no hernias identified     Genitourinary  · External Genitalia: normal appearance for age, no discharge present, no tenderness present, no inflammatory lesions present, no masses present, no atrophy present  · Vagina: normal vaginal vault without central or paravaginal defects, no discharge present, no inflammatory lesions present, no masses present  · Bladder: non-tender to palpation  · Urethra: appears normal  · Cervix: normal              · Uterus: normal size, shape and consistency  · Adnexa: no adnexal tenderness present, no adnexal masses present  · Perineum: perineum within normal limits, no evidence of trauma, no rashes or skin lesions present  · Anus: anus within normal limits, no hemorrhoids present  · Inguinal Lymph Nodes: no lymphadenopathy present     Skin  · General Inspection: no rash, no lesions identified     Neurologic/Psychiatric  · Mental Status:  · Orientation: grossly oriented to person, place and time  · Mood and Affect: mood normal, affect appropriate      Assessment:    DUB  Likely endometrial polyp or intracavitary fibroid     Plan:   pipelle benign  Desires to proceed w Myosure polypectomy or fibroid removal followed by Novasure endometrial ablation. Patient was fully  counseled concerning risks of surgery include bleeding, transfusion, infection, readmission, abscess drainage, injury to abdominal organs including bowel, bladder, ureters, vessels, nerves, need for additional surgery, injury may not be recognized at time of surgery, and risk of death.   Literature shared

## 2021-11-29 NOTE — DISCHARGE SUMMARY
Gynecology Discharge Summary     Patient ID:  Imani Greene  [id number]  28 y.o.  1975    Admit date: 11/29/2021    Discharge date: 11/29/2021     Admission Diagnoses:   Patient Active Problem List   Diagnosis Code    Twins [id number]       Discharge Diagnoses: There are no discharge diagnoses documented for the most recent discharge. Patient Active Problem List   Diagnosis Code    Twins [id number]       Procedures for this admission: Procedure(s): HYSTEROSCOPY/DILATATION AND CURETTAGE/MYOMECTOMY VERSUS POLYPECTOMY [address] Course:    Disposition: home    Discharged Condition: good            Patient Instructions:   Current Discharge Medication List      CONTINUE these medications which have NOT CHANGED    Details   pantoprazole (Protonix) 40 mg tablet Take 40 mg by mouth daily. desog-e.estradioL/e.estradioL (Mircette, 28,) 0.15-0.02 mgx21 /0.01 mg x 5 tab Take 1 Tablet by mouth daily. Qty: 3 Package, Refills: 4      sertraline (ZOLOFT) 50 mg tablet Take  by mouth daily. Ubrelvy 50 mg tablet TAKE 1 TABLET BY MOUTH 1 TIME FOR UP TO 1 DOSE AS NEEDED FOR MIGRAINE           Activity: Activity as tolerated and no driving for today and No sex for 1 week  Diet: Regular Diet  Wound Care: Keep wound clean and dry and None needed     The office will call and check in on you next week. If any concerns, send a [employer] message or call the office at [phone]; if the office is open, you can speak directly to her nurse by hitting [phone] and extension [phone]. Things went smoothly!     Signed:  Lizz Jimenez MD  58/00/7435  8:14 AM

## 2021-11-30 ENCOUNTER — PATIENT OUTREACH (OUTPATIENT)
Dept: OTHER | Age: 46
End: 2021-11-30

## 2021-11-30 NOTE — PROGRESS NOTES
[facility] Outreach    Call placed to pt, no answer. VM left to return call. Purpose of TC    Pt was seen at [facility] ER 7/18. Diagnosis: Palpitations    Pt was admitted to [facility] 11/29/21 for a scheduled procedure     Procedures for this admission: Procedure(s): HYSTEROSCOPY/DILATATION AND CURETTAGE/MYOMECTOMY VERSUS POLYPECTOMY WITH MYOSURE/NOVASURE ABLATION       Plan of action  F/U in 1 day due to no answer.

## 2021-12-02 ENCOUNTER — PATIENT OUTREACH (OUTPATIENT)
Dept: OTHER | Age: 46
End: 2021-12-02

## 2021-12-02 NOTE — PROGRESS NOTES
[facility]     Call placed to pt, Verified  and address for HIPAA security. Pt was admitted to [facility] for a scheduled procedure 21    Procedures for this admission: Procedure(s): HYSTEROSCOPY/DILATATION AND CURETTAGE/MYOMECTOMY VERSUS POLYPECTOMY WITH MYOSURE/NOVASURE ABLATION    Pt reported she is doing well, denies s/s of infection or pain. Reported minimal spotting, which is normal. Patient denies C/P, SOB, cough, wheezing, fever, pain/swelling of legs or feet, N/V, diarrhea, difficulty urinating or constipation. Appetite/hydration good. Pt is scheduled to f/u with OBGYN 21 and RTW 21     Goals      Attends follow-up appointments as directed. PCP - TBD  ENT - TBD  RTW - 21 pt was seen at [facility] ER 21 for CP  PCP - TBD, provider on vacation   ENT - attended appt F/U TBD  Cardio - TBD, pt waiting on return call due to provider on vacation     21  Pt was seen again at [facility] ER . Diagnosis: Palpitations  PCP - pt left a message TBD  Cardio - attended appt today 21 F/U 21  Echo - 21  GI - attended appt 21 F/U Aug   Holter monitor - wearing X 14 days     21 Call placed to patient, no answer. Voicemail left to return call. 21   PCP - 21  Neuro - 21  Mental health counselor Marina Backbone - weekly zoom  Be Well - today   RTW - 9/10/21 - DAVID due to stress/panic attacks    10/7/21 Call placed to patient, no answer. Voicemail left to return call. 21  PCP - 21 F/U TBD   Neuro - 21 F/U 21   Mental health counselor Marina Backbone - weekly zoom  OBGYN surgery - TBD     21  OBGYN - 21  RTW - 21        Knowledge and adherence to medication plan. Taking prescribed meds including ABX until gone.  Supportive resources in place to maintain patient in the community (ie.  Home Health, DME equipment, refer to, medication assistant plan, etc.)      Francisco Murray - # [phone] 24/7  Nurse Access line 24/7  Be Well  [address] [facility] [phone]  HR Service Now - Karol  BS Workday  IT - [phone]  [employer] [facility] - 1- [phone]  Associate Services for advice and direction, by calling [phone] and pressing 1  [employer] - [phone] Go to [email or web address] on the Internet or your mobile device and enter the password [email or web address] to access resources, educational information, and self-service options.  Understands red flags post discharge. You have signs of infection, such as: Increased pain, swelling, warmth, or redness. Red streaks leading from the area. Pus draining from the area. A fever. You have clear fluid draining from your nose. You have vision changes. Your nose is bleeding. You have new or worse pain. 7/12/21 denies CP or SOB, but does report fatigue. Reported staying hydrated. 7/19/21 denies CP, SOB, wheeze, N/V. Pt does report a H/A today and frustrated due to inability to find the cause of s/s. Pt denies consuming caffeine, stimulants, herbal supplements etc.     8/31/21 denies red flags. Reported CP/palpitations were primarily related to stress/panic attacks.            CM will f/u in 4 weeks

## 2021-12-07 ENCOUNTER — OFFICE VISIT (OUTPATIENT)
Dept: OBGYN CLINIC | Age: 46
End: 2021-12-07
Payer: COMMERCIAL

## 2021-12-07 VITALS — SYSTOLIC BLOOD PRESSURE: 110 MMHG | DIASTOLIC BLOOD PRESSURE: 72 MMHG

## 2021-12-07 DIAGNOSIS — Z09 POSTOP CHECK: Primary | ICD-10-CM

## 2021-12-07 PROCEDURE — 99024 POSTOP FOLLOW-UP VISIT: CPT | Performed by: OBSTETRICS & GYNECOLOGY

## 2021-12-07 NOTE — PROGRESS NOTES
Postop Evaluation    Alissa Smith is a 42 y.o. female returns for a routine post-operative follow-up visit after undergoing the following: Hysteroscopy D&C myomectomy, polypectomy w/ Myosure, Novasure ablation which was done one week ago. Her pathology results revealed benign endometrium and endometrial polyp with hormone effect, Smooth muscle compatible with leiomyoma, Benign endocervix and ectocervix. Since the patient's surgery, she has had typical postoperative discomfort but no significant symptoms or problems since the surgery. Taking no pain medication  The patient's incision is healing well with no significant drainage. She states since the procedure, she has returned to full daily activities, ambulating, and not lifting or exercising. PHYSICAL EXAMINATION    Gastrointestinal  · Abdominal Examination: abdomen non-tender to palpation, incision/s healing well, normal bowel sounds, no masses present  · Liver and spleen: no hepatomegaly present, spleen not palpable  · Hernias: no hernias identified    Genitourinary  · External Genitalia: normal appearance for age, no discharge present, no tenderness present, no inflammatory lesions present, no masses present, no atrophy present  · Vagina: normal vaginal vault without central or paravaginal defects, no discharge present, no inflammatory lesions present, no masses present  · Bladder: non-tender to palpation  · Urethra: appears normal  · Cervix: normal   · Uterus: normal size, shape and consistency  · Adnexa: no adnexal tenderness present, no adnexal masses present  · Perineum: perineum within normal limits, no evidence of trauma, no rashes or skin lesions present  Skin  · General Inspection: no rash, no lesions identified    Neurologic/Psychiatric  · Mental Status:  · Orientation: grossly oriented to person, place and time  · Mood and Affect: mood normal, affect appropriate    Assessment:  Normal postop checkup    Plan:  RTO as scheduled for AE.

## 2021-12-07 NOTE — PATIENT INSTRUCTIONS
Endometrial Ablation: What to Expect at [facility]  Endometrial ablation is a procedure to treat very heavy menstrual bleeding or other abnormal bleeding in the uterus. During ablation, the lining of the uterus is destroyed. The lining heals by scarring. The scarring reduces or prevents bleeding. Your doctor inserted a device into your uterus to destroy the lining. You may have cramps and vaginal bleeding for several days. You may also have watery vaginal discharge mixed with blood for a few days. It may take a few days to 2 weeks to recover. This care sheet gives you a general idea about how long it will take for you to recover. But each person recovers at a different pace. Follow the steps below to feel better as quickly as possible. How can you care for yourself at home? Activity    · Rest when you feel tired. Getting enough sleep will help you recover.     · Most women are able to return to work on the day after the procedure.     · You may shower and take baths as usual.     · Ask your doctor when it is okay for you to have sex. Diet    · You can eat your normal diet. If your stomach is upset, try bland, low-fat foods like plain rice, broiled chicken, toast, and yogurt.     · You may notice that your bowel movements are not regular right after the procedure. This is common. Try to avoid constipation and straining with bowel movements. You may want to take a fiber supplement every day. If you have not had a bowel movement after a couple of days, ask your doctor about taking a mild laxative. Medicines    · Your doctor will tell you if and when you can restart your medicines. He or she will also give you instructions about taking any new medicines.     · If you take aspirin or some other blood thinner, ask your doctor if and when to start taking it again. Make sure that you understand exactly what your doctor wants you to do.     · Take pain medicines exactly as directed.   ? If the doctor gave you a prescription medicine for pain, take it as prescribed. ? If you are not taking a prescription pain medicine, ask your doctor if you can take an over-the-counter medicine.     · If you think your pain medicine is making you sick to your stomach:  ? Take your medicine after meals (unless your doctor has told you not to). ? Ask your doctor for a different pain medicine.     · If your doctor prescribed antibiotics, take them as directed. Do not stop taking them just because you feel better. You need to take the full course of antibiotics. Other instructions    · You may have some light vaginal bleeding. Wear sanitary pads if needed. Do not douche or use tampons until your doctor says it is okay.     · You may want to use a heating pad on your belly to help with pain. Use a low heat setting.     · Talk with your doctor about birth control. Endometrial ablation usually causes infertility, but pregnancy may still be possible. And the pregnancy could have severe problems. Follow-up care is a key part of your treatment and safety. Be sure to make and go to all appointments, and call your doctor if you are having problems. It's also a good idea to know your test results and keep a list of the medicines you take. When should you call for help? Call 911 anytime you think you may need emergency care. For example, call if:    · You passed out (lost consciousness).     · You have chest pain, are short of breath, or cough up blood. Call your doctor now or seek immediate medical care if:    · You have pain that does not get better after you take pain medicine.     · You cannot pass stools or gas.     · You have vaginal discharge that has increased in amount or smells bad.     · You are sick to your stomach or cannot drink fluids.     · You have signs of infection, such as:  ? Increased pain, swelling, warmth, or redness.   ? A fever.     · You have bright red vaginal bleeding that soaks one or more pads in an hour, or you have large clots.     · You have signs of a blood clot in your leg (called a deep vein thrombosis), such as:  ? Pain in your calf, back of the knee, thigh, or groin. ? Redness and swelling in your leg. Watch closely for any changes in your health, and be sure to contact your doctor if you have any problems. Where can you learn more? Go to http://www.gray.com/  Enter W882 in the search box to learn more about \"Endometrial Ablation: What to Expect at Home. \"  Current as of: February 11, 2021               Content Version: 13.0  © 9611-6018 [employer]. Care instructions adapted under license by [employer] (which disclaims liability or warranty for this information). If you have questions about a medical condition or this instruction, always ask your healthcare professional. [address] any warranty or liability for your use of this information.

## 2022-03-09 ENCOUNTER — PATIENT OUTREACH (OUTPATIENT)
Dept: OTHER | Age: 47
End: 2022-03-09

## 2022-03-21 ENCOUNTER — TELEPHONE (OUTPATIENT)
Dept: NEUROLOGY | Age: 47
End: 2022-03-21

## 2022-04-13 ENCOUNTER — TELEPHONE (OUTPATIENT)
Dept: NEUROLOGY | Age: 47
End: 2022-04-13

## 2022-04-27 ENCOUNTER — TELEPHONE (OUTPATIENT)
Dept: NEUROLOGY | Age: 47
End: 2022-04-27

## 2022-04-27 NOTE — TELEPHONE ENCOUNTER
Blanca Orteag from [employer] law office called concerning a fax for a phone conference.  YOK#[phone]

## 2022-05-16 ENCOUNTER — OFFICE VISIT (OUTPATIENT)
Dept: NEUROLOGY | Age: 47
End: 2022-05-16
Payer: COMMERCIAL

## 2022-05-16 VITALS
SYSTOLIC BLOOD PRESSURE: 118 MMHG | DIASTOLIC BLOOD PRESSURE: 84 MMHG | HEART RATE: 70 BPM | BODY MASS INDEX: 24.28 KG/M2 | RESPIRATION RATE: 18 BRPM | WEIGHT: 179 LBS | OXYGEN SATURATION: 98 %

## 2022-05-16 DIAGNOSIS — F41.9 ANXIETY DISORDER, UNSPECIFIED TYPE: ICD-10-CM

## 2022-05-16 DIAGNOSIS — G43.009 MIGRAINE WITHOUT AURA AND WITHOUT STATUS MIGRAINOSUS, NOT INTRACTABLE: Primary | ICD-10-CM

## 2022-05-16 PROCEDURE — 99213 OFFICE O/P EST LOW 20 MIN: CPT | Performed by: PSYCHIATRY & NEUROLOGY

## 2022-05-16 NOTE — PROGRESS NOTES
Neurology Clinic Follow up Note    Patient ID:  Latonya Heard  [id number]  42 y.o.  1975      Ms. Katy Newsome is here for follow up today of  Chief Complaint   Patient presents with    Follow-up     Headaches have improved, have not had the need for ubrelvy. Last Appointment With Me:  11/9/2021    \"Patient reports onset of headache 2-3/2020. Headaches were associated with nausea lasting up to 24h occurring every 2 weeks on average. She was diagnosed with COVID July 2020 with worsening of headaches. Location: Bi-temporal, R alton-orbital  Character: Pulsation  Intensity: On average 3-7/10  Frequency: 1-2x weekly  # HA free days per month: 20  Duration: 24h  Aura: None  Associated Sx with HA: no nausea/vomiting, no phonophotophobia. Neurological ROS: Denies focal weakness, numbness or vision loss associated with headaches  Systemic ROS:   No h/o snoring  Caffeine use: None  H/O Head trauma: None  Depressive or anxiety Sx: None    Any change in pattern of HA? See above    Triggers: None. No worsening reported with cough/sneeze, bending over  Alleviating factors: N/A  FHx HA/migraine: None\"    Interval History:   Headaches are doing much better since last visit after starting sertraline for anxiety. She reports only one headaches related to a sinus infection since last visit. She has not required Ubrelvy for rescue therapy. She overall feels things are better on sertraline including her anxiety. PMHx/ PSHx/ FHx/ SHx:  Reviewed and unchanged previous visit.    Past Medical History:   Diagnosis Date    Adverse effect of anesthesia     BENZOCAINE ALOE VERA DROPS HR INTO THE 30'S    Anemia     Arthritis     Rt  Knee    Basal cell carcinoma 9421    Complication of anesthesia     decreased HR    GERD (gastroesophageal reflux disease)     Headache     had covid July 2020    Ill-defined condition     MYOCARDITIS FROM COVID    Ill-defined condition 07/2020    COVID     Infertility, female 6 rounds of IUI - not for infertility, but no partner    [facility] ([facility] [address]     Psychiatric disorder     ANXIETY         ROS:  Comprehensive review of systems negative except for as noted above. Objective:       Meds:  Current Outpatient Medications   Medication Sig Dispense Refill    pantoprazole (Protonix) 40 mg tablet Take 40 mg by mouth daily.  Ubrelvy 50 mg tablet TAKE 1 TABLET BY MOUTH 1 TIME FOR UP TO 1 DOSE AS NEEDED FOR MIGRAINE (Patient not taking: Reported on 12/7/2021)      sertraline (ZOLOFT) 50 mg tablet Take  by mouth daily. Exam:  Visit Vitals  /84   Pulse 70   Resp 18   Wt 179 lb (81.2 kg)   SpO2 98%   BMI 24.28 kg/m²     NEUROLOGICAL EXAM:  General: Awake, alert, speech fluent  CN: PERRL, EOMI without nystagmus, VFF to confrontation, facial sensation and strength are normal and symmetric, hearing is intact to finger rub bilaterally, palate and tongue movements are intact and symmetric. Motor: Normal tone, bulk and strength bilaterally. Reflexes: 2/4 throughout  Coordination: FNF, MANDIE, HTS intact. Sensation: LT intact throughout. Gait: Normal-based and steady. LABS  Results for orders placed or performed during the hospital encounter of 11/29/21   CBC W/O DIFF   Result Value Ref Range    WBC 4.7 3.6 - 11.0 K/uL    RBC 3.90 3.80 - 5.20 M/uL    HGB 9.7 (L) 11.5 - 16.0 g/dL    HCT 30.6 (L) 35.0 - 47.0 %    MCV 78.5 (L) 80.0 - 99.0 FL    MCH 24.9 (L) 26.0 - 34.0 PG    MCHC 31.7 30.0 - 36.5 g/dL    RDW 14.0 11.5 - 14.5 %    PLATELET 479 766 - 805 K/uL    MPV 9.1 8.9 - 12.9 FL    NRBC 0.0 0  WBC    ABSOLUTE NRBC 0.00 0.00 - 0.01 K/uL   HCG URINE, QL. - POC   Result Value Ref Range    Pregnancy test,urine (POC) Negative NEG         IMAGING:  CT Results (most recent):  Results from Abstract encounter on 03/29/22    CT HEAD WO CONT      MRI Results (maximum last 3):   Results from [place] encounter on 12/09/20   MRI BRAIN W WO CONT    Narrative EXAM:  MRI BRAIN W WO CONT, MRI BRAIN MRV WO CONT    INDICATION:    worsening headaches, change in headache character post COVID    COMPARISON:  None. CONTRAST: 15 ml Dotarem. TECHNIQUE:    Multiplanar multisequence acquisition without and with contrast of the brain. Time-of-flight noncontrast MRV of the brain was performed. Multiplanar and MIP  reconstructions were obtained. FINDINGS:  MRI brain:  The ventricles are normal in size and position. Minimal (less than 5) tiny  nonspecific T2/FLAIR white matter hyperintensities, of doubtful clinical  significance. There is no acute infarct, hemorrhage, extra-axial fluid  collection, or mass effect. There is no cerebellar tonsillar herniation. Expected arterial flow-voids are present. No evidence of abnormal enhancement. The paranasal sinuses are clear. Trace bilateral mastoid effusions. The orbital  contents are within normal limits. No significant osseous or scalp lesions are  identified. MRV head: The dural venous sinuses and deep cerebral veins are patent without evidence of  thrombosis. Right dominant transverse sinus, with no significant stenosis of the  transverse sinuses. Impression IMPRESSION:   MRI brain:  1. No acute or significant intracranial abnormality. MRV head:  1. No evidence of venous sinus thrombosis. MRV BRAIN WO CONT    Narrative EXAM:  MRI BRAIN W WO CONT, MRI BRAIN MRV WO CONT    INDICATION:    worsening headaches, change in headache character post COVID    COMPARISON:  None. CONTRAST: 15 ml Dotarem. TECHNIQUE:    Multiplanar multisequence acquisition without and with contrast of the brain. Time-of-flight noncontrast MRV of the brain was performed. Multiplanar and MIP  reconstructions were obtained. FINDINGS:  MRI brain:  The ventricles are normal in size and position. Minimal (less than 5) tiny  nonspecific T2/FLAIR white matter hyperintensities, of doubtful clinical  significance.  There is no acute infarct, hemorrhage, extra-axial fluid  collection, or mass effect. There is no cerebellar tonsillar herniation. Expected arterial flow-voids are present. No evidence of abnormal enhancement. The paranasal sinuses are clear. Trace bilateral mastoid effusions. The orbital  contents are within normal limits. No significant osseous or scalp lesions are  identified. MRV head: The dural venous sinuses and deep cerebral veins are patent without evidence of  thrombosis. Right dominant transverse sinus, with no significant stenosis of the  transverse sinuses. Impression IMPRESSION:   MRI brain:  1. No acute or significant intracranial abnormality. MRV head:  1. No evidence of venous sinus thrombosis. Assessment:     Encounter Diagnoses     ICD-10-CM ICD-9-CM   1. Migraine without aura and without status migrainosus, not intractable  G43.009 346.10   2. Anxiety disorder, unspecified type  F41.9 27.00     42year old pleasant female here for f/u of episodic migrainous headaches since 2/2020 with interval worsening frequency post COVID 7/2020. She has previously noted an increase in headache frequency associated with worsening anxiety/panic attacks. MRI Brain/MRV were obtained showing no evidence of acute structural pathology, mass or venous sinus thrombosis. Minimal T2 hyperintensities noted are non-specific and possible related to headache given lack of vascular risk factors. CT head was recently completed during ED evaluation 8/3/21 with headache and symptoms concerning for panic attack showing no acute intracranial pathology, L temporoparietal bony density c/w possible tiny meningioma. MRI Brain WWO was repeated 8/21/21 and without acute intracranial pathology or mass. Migraines and anxiety disorder appear to be much improved with initiation of sertraline. She may use Ubrelvy PRN for migraine rescue therapy.      PLAN:  Continue sertraline for anxiety and migraine prophylaxis  May continue Ubrelvy 50mg PRN for migraine rescue therapy       Follow-up and Dispositions    · Return if symptoms worsen or fail to improve. I have discussed the diagnosis with the patient today and the intended plan as seen in the above orders with both the patient as well as referring provider and/or PCP via electronic correspondence. The patient has received an after-visit summary and questions were answered concerning future plans.     Signed:  Shanti Alicea DO  5/16/2022

## 2022-06-09 NOTE — PATIENT INSTRUCTIONS
Pelvic Exam: Care Instructions  Overview     When your doctor examines your pelvic organs, it's called a pelvic exam. This exam is done to evaluate symptoms, such as pelvic pain or abnormal vaginal bleeding and discharge. It may also be done to collect samples of cells for cervical cancer screening. Before your exam, it's important to share some information with your doctor. You can talk about any concerns you may have. Your doctor will also want to know if you are pregnant or use birth control. And your doctor will want to hear about any problems, surgeries, or procedures you have had in your pelvic area. You will also need to tell your doctor when your last period was. Follow-up care is a key part of your treatment and safety. Be sure to make and go to all appointments, and call your doctor if you are having problems. It's also a good idea to know your test results and keep a list of the medicines you take. How is a pelvic exam done? · During a pelvic exam, you will:  ? Take off your clothes below the waist. You will get a paper or cloth cover to put over the lower half of your body. ? Lie on your back on an exam table with your feet and legs supported by footrests. · The doctor may:  ? Ask you to relax your knees. Your knees need to lean out, toward the walls. ? Check the opening of your vagina for sores or swelling. ? Gently put a tool called a speculum into your vagina. It opens the vagina a little bit. You may feel some pressure. The speculum lets your doctor see inside the vagina. ? Use a small brush, spatula, or swab to get a sample for testing. The doctor then removes the speculum. ? Put on gloves and put one or two fingers of one hand into your vagina. The other hand goes on your lower belly. This lets your doctor feel your pelvic organs. You will probably feel some pressure. ? Put one gloved finger into your rectum and one into your vagina, if needed.  This can also help check your pelvic organs. You may have a small amount of vaginal discharge or bleeding after the exam.  Why is a pelvic exam done? A pelvic exam may be done:  · To collect samples of cells for cervical cancer screening. · To check for vaginal infection. · To check for sexually transmitted infections, such as chlamydia or herpes. · To help find the cause of abnormal uterine bleeding. · To look for problems like uterine fibroids, ovarian cysts, or uterine prolapse. · To help find the cause of pelvic or belly pain. · Before inserting an intrauterine device (IUD). · To collect evidence if you've been sexually assaulted. What are the risks of a pelvic exam?  There is a small chance that the doctor will find something on a pelvic exam that would not have caused a problem. This is called overdiagnosis. It could lead to tests or treatment you don't need. When should you call for help? Watch closely for changes in your health, and be sure to contact your doctor if you have any problems. Where can you learn more? Go to http://www.gray.com/  Enter M421 in the search box to learn more about \"Pelvic Exam: Care Instructions. \"  Current as of: November 22, 2021               Content Version: 13.2  © 5845-2603 [employer]. Care instructions adapted under license by [employer] (which disclaims liability or warranty for this information). If you have questions about a medical condition or this instruction, always ask your healthcare professional. [place] [address] any warranty or liability for your use of this information.

## 2022-06-10 ENCOUNTER — OFFICE VISIT (OUTPATIENT)
Dept: OBGYN CLINIC | Age: 47
End: 2022-06-10
Payer: COMMERCIAL

## 2022-06-10 VITALS — WEIGHT: 192 LBS | SYSTOLIC BLOOD PRESSURE: 110 MMHG | BODY MASS INDEX: 26.04 KG/M2 | DIASTOLIC BLOOD PRESSURE: 62 MMHG

## 2022-06-10 DIAGNOSIS — Z01.419 WELL WOMAN EXAM WITH ROUTINE GYNECOLOGICAL EXAM: Primary | ICD-10-CM

## 2022-06-10 PROCEDURE — 99396 PREV VISIT EST AGE 40-64: CPT | Performed by: OBSTETRICS & GYNECOLOGY

## 2022-06-10 NOTE — PROGRESS NOTES
Annual exam ages 40-58    Nel Stevenson is a ,  41 y.o. female WHITE/NON- No LMP recorded. Patient has had an ablation. .    She presents for her annual checkup. She is having no significant problems. S/p myosure and novasure ablation in January. No vaginal bleeding since. Reports some pelvic pressure. Kids now 5         Menstrual status:    Her periods are absent secondary to ablation. She denies dysmenorrhea. She reports no premenstrual symptoms. Contraception:    The current method of family planning is none. Sexual history:    She  reports previously being sexually active and has had partner(s) who are male. She reports using the following method of birth control/protection: None. Medical conditions:    Since her last annual GYN exam about one year ago, she has not the following changes in her health history: none. Pap and Mammogram History:    Her most recent Pap smear was normal, obtained 1 year(s) ago. The patient had a recent mammogram 2021 which was negative for malignancy. Breast Cancer History/Substance Abuse: negative    Osteoporosis History:    Family history does not include a first or second degree relative with osteopenia or osteoporosis.       Past Medical History:   Diagnosis Date    Adverse effect of anesthesia     BENZOCAINE ALOE VERA DROPS HR INTO THE 30'S    Anemia     Arthritis     Rt  Knee    Basal cell carcinoma 9853    Complication of anesthesia     decreased HR    GERD (gastroesophageal reflux disease)     Headache     had covid 2020    Ill-defined condition     MYOCARDITIS FROM COVID    Ill-defined condition 2020    COVID     Infertility, female     6 rounds of IUI - not for infertility, but no partner    Myocarditis ([facility] [address]     Psychiatric disorder     ANXIETY     Past Surgical History:   Procedure Laterality Date    HX  SECTION      HX GYN  2015    UTERINE POLYP    HX HEENT      WISDOM TEETH REMOVED    HX KNEE ARTHROSCOPY Right 07/2000    HX MALIGNANT SKIN LESION EXCISION  12/2019    HX MALIGNANT SKIN LESION EXCISION  10/2019    HX OTHER SURGICAL      SKIN CANCER REMOVED FROM NOSE FOREHEAD NECK CHEST BACK AND STOACH    HX SKIN BIOPSY  2012    Basal cell carcinoma removed from face, nose, L and R arm and abdomen    HX WISDOM TEETH EXTRACTION         Current Outpatient Medications   Medication Sig Dispense Refill    pantoprazole (Protonix) 40 mg tablet Take 40 mg by mouth daily.  sertraline (ZOLOFT) 50 mg tablet Take  by mouth daily.  Ubrelvy 50 mg tablet TAKE 1 TABLET BY MOUTH 1 TIME FOR UP TO 1 DOSE AS NEEDED FOR MIGRAINE (Patient not taking: Reported on 6/10/2022)       Allergies: Amoxicillin and Anesthetic [benzocaine-aloe vera]     Tobacco History:  reports that she has never smoked. She has never used smokeless tobacco.  Alcohol Abuse:  reports no history of alcohol use. Drug Abuse:  reports no history of drug use.     Family Medical/Cancer History:   Family History   Problem Relation Age of Onset   [facility] Cancer Mother         soft tissue sarcoma    Diabetes Mother     Hypertension Mother     Cancer Father         prostate & skin    Hypertension Father     Bleeding Prob Brother         blood clot after surgery    Deep Vein Thrombosis Brother     Breast Cancer Paternal Grandmother         postmen    Breast Cancer Maternal Aunt         postmen    Anesth Problems Neg Hx         Review of Systems - History obtained from the patient  Constitutional: negative for weight loss, fever, night sweats  HEENT: negative for hearing loss, earache, congestion, snoring, sorethroat  CV: negative for chest pain, palpitations, edema  Resp: negative for cough, shortness of breath, wheezing  GI: negative for change in bowel habits, abdominal pain, black or bloody stools  : negative for frequency, dysuria, hematuria, vaginal discharge  MSK: negative for back pain, joint pain, muscle pain  Breast: negative for breast lumps, nipple discharge, galactorrhea  Skin :negative for itching, rash, hives  Neuro: negative for dizziness, headache, confusion, weakness  Psych: negative for anxiety, depression, change in mood  Heme/lymph: negative for bleeding, bruising, pallor    Physical Exam    Visit Vitals  /62   Wt 192 lb (87.1 kg)   BMI 26.04 kg/m²       Constitutional  · Appearance: well-nourished, well developed, alert, in no acute distress    HENT  · Head and Face: appears normal    Chest  · Respiratory Effort: breathing unlabored      Breasts  · Inspection of Breasts: breasts symmetrical, no skin changes, no discharge present, nipple appearance normal, no skin retraction present  · Palpation of Breasts and Axillae: no masses present on palpation, no breast tenderness  · Axillary Lymph Nodes: no lymphadenopathy present    Gastrointestinal  · Abdominal Examination: abdomen non-tender to palpation, normal bowel sounds, no masses present  · Liver and spleen: no hepatomegaly present, spleen not palpable  · Hernias: no hernias identified    Skin  · General Inspection: no rash, no lesions identified    Neurologic/Psychiatric  · Mental Status:  · Orientation: grossly oriented to person, place and time  · Mood and Affect: mood normal, affect appropriate    Genitourinary  · External Genitalia: normal appearance for age, no discharge present, no tenderness present, no inflammatory lesions present, no masses present, no atrophy present  · Vagina: normal vaginal vault without central or paravaginal defects, no discharge present, no inflammatory lesions present, no masses present  · Bladder: non-tender to palpation  · Urethra: appears normal  · Cervix: normal   · Uterus: normal size, shape and consistency  · Adnexa: no adnexal tenderness present, no adnexal masses present  · Perineum: perineum within normal limits, no evidence of trauma, no rashes or skin lesions present  · Anus: anus within normal limits, no hemorrhoids present  · Inguinal Lymph Nodes: no lymphadenopathy present    Assessment:  Routine gynecologic examination  Her current medical status is satisfactory with no evidence of significant gynecologic issues. Plan:  Pap done today     Patient declines presence of chaperone during today's visit.    Counseled re: diet, exercise, healthy lifestyle  Return for yearly wellness visits  Rec annual mammogram

## 2022-08-03 ENCOUNTER — HOSPITAL ENCOUNTER (EMERGENCY)
Age: 47
Discharge: HOME | End: 2022-08-03
Attending: PEDIATRICS
Payer: COMMERCIAL

## 2022-08-03 ENCOUNTER — NURSE TRIAGE (OUTPATIENT)
Dept: OTHER | Facility: CLINIC | Age: 47
End: 2022-08-03

## 2022-08-03 VITALS
OXYGEN SATURATION: 97 % | TEMPERATURE: 99.1 F | HEART RATE: 67 BPM | RESPIRATION RATE: 16 BRPM | BODY MASS INDEX: 25.5 KG/M2 | WEIGHT: 188 LBS

## 2022-08-03 DIAGNOSIS — Z23 RABIES, NEED FOR PROPHYLACTIC VACCINATION AGAINST: Primary | ICD-10-CM

## 2022-08-03 PROCEDURE — 90675 RABIES VACCINE IM: CPT | Performed by: NURSE PRACTITIONER

## 2022-08-03 PROCEDURE — 90471 IMMUNIZATION ADMIN: CPT

## 2022-08-03 PROCEDURE — 74011250636 HC RX REV CODE- 250/636: Performed by: NURSE PRACTITIONER

## 2022-08-03 PROCEDURE — 75810000275 HC EMERGENCY DEPT VISIT NO LEVEL OF CARE

## 2022-08-03 PROCEDURE — 90375 RABIES IG IM/SC: CPT | Performed by: NURSE PRACTITIONER

## 2022-08-03 RX ADMIN — RABIES IMMUNE GLOBULIN (HUMAN) 1710 UNITS: 300 INJECTION, SOLUTION INFILTRATION; INTRAMUSCULAR at 16:52

## 2022-08-03 RX ADMIN — RABIES VACCINE 2.5 UNITS: KIT at 16:49

## 2022-08-03 NOTE — PROGRESS NOTES
8/3/2022 -     CM notes CM Consult for Rabies Vaccine Setup. CM met with patient, with patient alert and oriented x4. Patient confirmed Name, , ins information, address, and phone number. CM confirmed that hard script has been scanned to patient's Media. CM discussed patient's ability to contact ins company to confirm most cost effective method of obtaining remaining needed rabies vaccines. CM identified that [employer] [facility] Team will contact patient within the next business day to set up the remaining needed rabies vaccines, if the patient chooses to utilize [employer]. Patient expressed understanding of the above. Patient has physical hard script for vaccines for discharge and is aware of ability to take the hard script to any vaccine provider should they choose to not utilize [employer] services. CM submitted scheduling request to [employer] [place] [address] Team via email. [facility] information is on AVS, including request to contact [facility] if patient has not received a call within one business day and the below dates.       Day 0: 8/3  Day 3:   Day 7: 8/10  Day 14:     CRM: Sean Malave, MPH, CHES  Z: [phone] or [phone]

## 2022-08-03 NOTE — TELEPHONE ENCOUNTER
Subjective: Caller states \"there was a bat, he was flying around the room. The pediatrician said the recommendation was they should be they should go to the ER and get the Rabies vaccine. I called my primary care doctor who hasn't called me back so I also called the health department who said I should go to the ER. \"     Current Symptoms: no s/s     Onset: saw bat 1 day ago; sudden. Unknown how long the bat has been in the house. Associated Symptoms: none    Pain Severity: 0/10; ;     Temperature: denies     What has been tried: called health department with advice to go to ED. Left message with PCP to call her back. LMP: NA Pregnant: NA    Recommended disposition: Follow advice of health department and get rabies vaccine at ED. Pt states she was informed by the health dept that there are no other locations in [place] that provide the 4 series vaccines except the ED. Care advice provided, patient verbalizes understanding; denies any other questions or concerns; instructed to call back for any new or worsening symptoms. Patient/caller agrees to proceed to nearest Emergency Department    Attention Provider: Thank you for allowing me to participate in the care of your patient. The patient was connected to triage in response to symptoms provided. Please do not respond through this encounter as the response is not directed to a shared pool. Reason for Disposition   General information question, no triage required and triager able to answer question    Protocols used:  Information Only Call - No Triage-ADULT-

## 2022-08-03 NOTE — DISCHARGE INSTRUCTIONS
You will need rabies vaccine administration on 8/6, 8/10 and 8/17    [facility] employee should be calling you tomorrow to arrange outpatient vaccine administration for you and your children.

## 2022-08-04 ENCOUNTER — PATIENT OUTREACH (OUTPATIENT)
Dept: OTHER | Age: 47
End: 2022-08-04

## 2022-08-04 NOTE — PROGRESS NOTES
8/4/22 CCSS Ester assisting ACM, Favio Butler, RN, with outreach to confirm F/U/completion of rabies treatment and to see if further assistance is needed. Called Ms. Ramón Best. She and the family are doing well. They've received the rabies vaccine and future appointments have already been made. No further needs.

## 2022-08-06 ENCOUNTER — HOSPITAL ENCOUNTER (OUTPATIENT)
Dept: INFUSION THERAPY | Age: 47
Discharge: HOME | End: 2022-08-06
Payer: COMMERCIAL

## 2022-08-06 VITALS
RESPIRATION RATE: 16 BRPM | TEMPERATURE: 98.1 F | HEART RATE: 70 BPM | SYSTOLIC BLOOD PRESSURE: 115 MMHG | DIASTOLIC BLOOD PRESSURE: 75 MMHG

## 2022-08-06 PROCEDURE — 90471 IMMUNIZATION ADMIN: CPT

## 2022-08-06 PROCEDURE — 74011250636 HC RX REV CODE- 250/636: Performed by: NURSE PRACTITIONER

## 2022-08-06 PROCEDURE — 90675 RABIES VACCINE IM: CPT | Performed by: NURSE PRACTITIONER

## 2022-08-06 RX ADMIN — RABIES VACCINE 2.5 UNITS: KIT at 10:53

## 2022-08-06 NOTE — PROGRESS NOTES
Outpatient Infusion Center Progress Note    5658 Pt admit to [facility] for Rabies [place] Day 3 ambulatory in stable condition. Assessment completed. No new concerns voiced. Visit Vitals  /75   Pulse 70   Temp 98.1 °F (36.7 °C)   Resp 16   Breastfeeding No       Medications:  Medications Administered       rabies vaccine, PCEC (RABAVERT) kit 2.5 Units       Admin Date  08/06/2022 Action  Given Dose  2.5 Units Route  Intra[employer] Administered By  Myra Zepeda RN             Given IM right thigh        1055 Pt tolerated treatment well. D/c home ambulatory in no distress.  Pt aware of next appointment scheduled for   Future Appointments   Date Time Provider Abilio Luis   8/10/2022  3:30 PM A3 [facility]    8/17/2022  3:00 PM B4 [facility]S [facility]

## 2022-08-10 ENCOUNTER — HOSPITAL ENCOUNTER (OUTPATIENT)
Dept: INFUSION THERAPY | Age: 47
Discharge: HOME | End: 2022-08-10
Payer: COMMERCIAL

## 2022-08-10 VITALS
DIASTOLIC BLOOD PRESSURE: 75 MMHG | SYSTOLIC BLOOD PRESSURE: 106 MMHG | TEMPERATURE: 97.5 F | HEART RATE: 61 BPM | RESPIRATION RATE: 18 BRPM

## 2022-08-10 PROCEDURE — 90675 RABIES VACCINE IM: CPT | Performed by: NURSE PRACTITIONER

## 2022-08-10 PROCEDURE — 74011250636 HC RX REV CODE- 250/636: Performed by: NURSE PRACTITIONER

## 2022-08-10 PROCEDURE — 90471 IMMUNIZATION ADMIN: CPT

## 2022-08-10 RX ADMIN — RABIES VACCINE 2.5 UNITS: KIT at 15:44

## 2022-08-10 NOTE — PROGRESS NOTES
PEDI [facility] VISIT NOTE - Rabies Vaccine Series     7062 Patient arrives for Rabies Vaccine Day 7 without acute problems. Please see connect care for complete assessment and education provided. Medications Verified by Froy George RN :  1. Rabavert 2.5 units IM via Right Deltoid     Vital signs stable throughout and prior to discharge. Patient discharged without incident. Patient is aware of next [facility] appointment on 08/17/2022. Appointment card given to patient.      VITAL SIGNS   Patient Vitals for the past 12 hrs:   Temp Pulse Resp BP   08/10/22 1535 97.5 °F (36.4 °C) 61 18 106/75

## 2022-08-11 LAB
CHOLEST SERPL-MCNC: 148 MG/DL
GLUCOSE SERPL-MCNC: 91 MG/DL (ref 65–100)
HDLC SERPL-MCNC: 82 MG/DL
LDLC SERPL CALC-MCNC: 50.4 MG/DL (ref 0–100)
TRIGL SERPL-MCNC: 78 MG/DL (ref ?–150)

## 2022-08-17 ENCOUNTER — HOSPITAL ENCOUNTER (OUTPATIENT)
Dept: INFUSION THERAPY | Age: 47
Discharge: HOME | End: 2022-08-17
Payer: COMMERCIAL

## 2022-08-17 VITALS
DIASTOLIC BLOOD PRESSURE: 66 MMHG | HEART RATE: 66 BPM | SYSTOLIC BLOOD PRESSURE: 111 MMHG | TEMPERATURE: 97 F | RESPIRATION RATE: 18 BRPM

## 2022-08-17 PROCEDURE — 90471 IMMUNIZATION ADMIN: CPT

## 2022-08-17 PROCEDURE — 90675 RABIES VACCINE IM: CPT | Performed by: NURSE PRACTITIONER

## 2022-08-17 PROCEDURE — 74011250636 HC RX REV CODE- 250/636: Performed by: NURSE PRACTITIONER

## 2022-08-17 RX ADMIN — RABIES VACCINE 2.5 UNITS: KIT at 15:38

## 2022-08-17 NOTE — PROGRESS NOTES
[facility]     1287 Patient arrives for Rabies Vaccine Day 14 without acute problems. Please see connect care for complete assessment and education provided. Medications Verified by Lis Tenorio RN:  1. Rabavert 2.5 units IM via Right Deltoid     Vital signs stable throughout and prior to discharge. Patient discharged without incident. Patient is aware of no further [facility] appointments @ this time & to follow up with healthcare provider for next appointment.       VITAL SIGNS   Patient Vitals for the past 12 hrs:   Temp Pulse Resp BP   08/17/22 1533 97 °F (36.1 °C) 66 18 111/66

## 2022-10-03 ENCOUNTER — TRANSCRIBE ORDER (OUTPATIENT)
Dept: SCHEDULING | Age: 47
End: 2022-10-03

## 2022-10-03 DIAGNOSIS — Z12.31 SCREENING MAMMOGRAM FOR HIGH-RISK PATIENT: Primary | ICD-10-CM

## 2022-10-27 ENCOUNTER — HOSPITAL ENCOUNTER (OUTPATIENT)
Dept: MAMMOGRAPHY | Age: 47
Discharge: HOME | End: 2022-10-27
Attending: OBSTETRICS & GYNECOLOGY
Payer: COMMERCIAL

## 2022-10-27 DIAGNOSIS — Z12.31 SCREENING MAMMOGRAM FOR HIGH-RISK PATIENT: ICD-10-CM

## 2022-10-27 PROCEDURE — 77063 BREAST TOMOSYNTHESIS BI: CPT

## 2022-11-28 ENCOUNTER — TRANSCRIBE ORDER (OUTPATIENT)
Dept: SCHEDULING | Age: 47
End: 2022-11-28

## 2022-11-28 DIAGNOSIS — M25.562 LEFT KNEE PAIN: Primary | ICD-10-CM

## 2022-11-30 ENCOUNTER — HOSPITAL ENCOUNTER (OUTPATIENT)
Dept: MRI IMAGING | Age: 47
Discharge: HOME | End: 2022-11-30
Attending: PHYSICIAN ASSISTANT
Payer: COMMERCIAL

## 2022-11-30 DIAGNOSIS — M25.562 LEFT KNEE PAIN: ICD-10-CM

## 2022-11-30 PROCEDURE — 73721 MRI JNT OF LWR EXTRE W/O DYE: CPT

## 2023-01-10 ENCOUNTER — TRANSCRIBE ORDER (OUTPATIENT)
Dept: SCHEDULING | Age: 48
End: 2023-01-10

## 2023-01-10 DIAGNOSIS — R10.13 EPIGASTRIC PAIN: Primary | ICD-10-CM

## 2023-01-17 ENCOUNTER — HOSPITAL ENCOUNTER (OUTPATIENT)
Dept: ULTRASOUND IMAGING | Age: 48
Discharge: HOME | End: 2023-01-17
Attending: INTERNAL MEDICINE
Payer: COMMERCIAL

## 2023-01-17 DIAGNOSIS — R10.13 EPIGASTRIC PAIN: ICD-10-CM

## 2023-01-17 PROCEDURE — 76700 US EXAM ABDOM COMPLETE: CPT

## 2023-02-06 ENCOUNTER — OFFICE VISIT (OUTPATIENT)
Dept: OBGYN CLINIC | Age: 48
End: 2023-02-06

## 2023-02-06 VITALS — BODY MASS INDEX: 26.58 KG/M2 | WEIGHT: 196 LBS | DIASTOLIC BLOOD PRESSURE: 68 MMHG | SYSTOLIC BLOOD PRESSURE: 110 MMHG

## 2023-02-06 DIAGNOSIS — R10.2 PELVIC PAIN IN FEMALE: Primary | ICD-10-CM

## 2023-02-06 PROCEDURE — 99213 OFFICE O/P EST LOW 20 MIN: CPT | Performed by: OBSTETRICS & GYNECOLOGY

## 2023-02-06 RX ORDER — CALCIUM CARB/VITAMIN D3/VIT K1 500-500-40
TABLET,CHEWABLE ORAL
COMMUNITY

## 2023-02-06 RX ORDER — METFORMIN HYDROCHLORIDE 500 MG/1
1000 TABLET, EXTENDED RELEASE ORAL
COMMUNITY
Start: 2023-01-31

## 2023-02-06 NOTE — PROGRESS NOTES
True Trotter is a 41 y.o. female who complains of pelvic pain and pressure. Hx of myosure and novasure ablation 2022. Reports no vaginal bleeding. Onset of discomfort over past 6 months; similar to pain when she had endometrial polyps. Pain is intermittent and a steady pressure for 20-30 minutes; several times a day on occasion but other days not present at all. No radiation; alleviated with motrin. Hx LTCS x1; twin boys- wide open now 10 yo      Her current method of family planning is abstinence. The patient is not sexually active. US today:  TV ULTRASOUND  THE UTERUS IS ANTEVERTED, NORMAL IN SIZE, AND ECHOGENICITY. THERE APPEARS TO BE FLUID SEEN WITHIN THE CERVIX. THE ENDOMETRIUM MEASURES 7.43 MM IN THICKNESS. NO MASSES OR ABNORMALITIES ARE SEEN. RIGHT OVARY APPEARS WNL. LEFT OVARY APPEARS WNL. NO FREE FLUID IS SEEN IN THE CDS.         Her relevant past medical history:   Past Medical History:   Diagnosis Date    Adverse effect of anesthesia     BENZOCAINE ALOE VERA DROPS HR INTO THE 30'S    Anemia     Arthritis     Rt  Knee    Basal cell carcinoma 7898    Complication of anesthesia     decreased HR    GERD (gastroesophageal reflux disease)     Headache     had covid 2020    Ill-defined condition     MYOCARDITIS FROM COVID    Ill-defined condition 2020    COVID     Infertility, female     6 rounds of IUI - not for infertility, but no partner    Myocarditis [facility])     Psychiatric disorder     ANXIETY        Past Surgical History:   Procedure Laterality Date    HX  SECTION      HX GYN  2015    UTERINE POLYP    HX HEENT      WISDOM TEETH REMOVED    HX KNEE ARTHROSCOPY Right 2000    HX MALIGNANT SKIN LESION EXCISION  2019    HX MALIGNANT SKIN LESION EXCISION  10/2019    HX OTHER SURGICAL      SKIN CANCER REMOVED FROM NOSE FOREHEAD NECK CHEST BACK AND STOACH    HX SKIN BIOPSY      Basal cell carcinoma removed from face, nose, L and R arm and abdomen    HX WISDOM TEETH EXTRACTION       Social History     Occupational History    Not on file   Tobacco Use    Smoking status: Never    Smokeless tobacco: Never   Vaping Use    Vaping Use: Never used   Substance and Sexual Activity    Alcohol use: No    Drug use: No    Sexual activity: Not Currently     Partners: Male     Birth control/protection: None     Family History   Problem Relation Age of Onset    Cancer Mother         soft tissue sarcoma    Diabetes Mother     Hypertension Mother     Cancer Father         prostate & skin    Hypertension Father     Bleeding Prob Brother         blood clot after surgery    Deep Vein Thrombosis Brother     Breast Cancer Paternal Grandmother         postmen    Breast Cancer Maternal Aunt         postmen    Anesth Problems Neg Hx        Allergies   Allergen Reactions    Amoxicillin Other (comments)     Migraines    Anesthetic [Benzocaine-Aloe Vera] Other (comments)     ANESTHESIA results in decreased heart rate (in the 30s)  Has happened twice     Prior to Admission medications    Medication Sig Start Date End Date Taking? Authorizing Provider   pantoprazole (PROTONIX) 40 mg tablet Take 40 mg by mouth daily. Yes Provider, Historical   sertraline (ZOLOFT) 50 mg tablet Take  by mouth daily.    Yes Provider, Historical   metFORMIN ER (GLUCOPHAGE XR) 500 mg tablet  1/31/23   Provider, Historical   vitamin b12-folic acid 5.5-4 mg tab Vitamin B12    Provider, Historical   cholecalciferol, vitamin d3, (Vitamin D3) 10 mcg (400 unit) cap Vitamin D3    Provider, Historical   iron-FA-dha-epa-FAD-NADH-be-mv 1.5 mg iron- 8.73 mg CpID iron  2/6/23  Provider, Historical        Review of Systems - History obtained from the patient  Constitutional: negative for weight loss, fever, night sweats  HEENT: negative for hearing loss, earache, congestion, snoring, sorethroat  CV: negative for chest pain, palpitations, edema  Resp: negative for cough, shortness of breath, wheezing  Breast: negative for breast lumps, nipple discharge, galactorrhea  GI: negative for change in bowel habits, abdominal pain, black or bloody stools  : negative for frequency, dysuria, hematuria  MSK: negative for back pain, joint pain, muscle pain  Skin: negative for itching, rash, hives  Neuro: negative for dizziness, headache, confusion, weakness  Psych: negative for anxiety, depression, change in mood  Heme/lymph: negative for bleeding, bruising, pallor      Objective:  Visit Vitals  /68   Wt 196 lb (88.9 kg)   BMI 26.58 kg/m²          PHYSICAL EXAMINATION    Constitutional  Appearance: well-nourished, well developed, alert, in no acute distress    HENT  Head and Face: appears normal      Gastrointestinal  Abdominal Examination: abdomen non-tender to palpation, normal bowel sounds, no masses present  Liver and spleen: no hepatomegaly present, spleen not palpable  Hernias: no hernias identified        Skin  General Inspection: no rash, no lesions identified    Neurologic/Psychiatric  Mental Status:  Orientation: grossly oriented to person, place and time  Mood and Affect: mood normal, affect appropriate    Assessment:    Pelvic pain of unclear etiology    Plan:   Reviewed symptoms and US; patient states pressure is mild and quite manageable; will update if sxs worsen; declined trial of progesterone only ocp  Patient declines presence of chaperone during today's visit.

## 2023-05-25 RX ORDER — PANTOPRAZOLE SODIUM 40 MG/1
40 TABLET, DELAYED RELEASE ORAL DAILY
COMMUNITY

## 2023-05-25 RX ORDER — SOY PROTEIN
POWDER (GRAM) ORAL
COMMUNITY

## 2023-05-25 RX ORDER — METFORMIN HYDROCHLORIDE 500 MG/1
1000 TABLET, EXTENDED RELEASE ORAL
COMMUNITY
Start: 2023-01-31

## 2023-09-11 ENCOUNTER — TRANSCRIBE ORDERS (OUTPATIENT)
Dept: SCHEDULING | Age: 48
End: 2023-09-11

## 2023-09-11 DIAGNOSIS — Z12.31 VISIT FOR SCREENING MAMMOGRAM: Primary | ICD-10-CM

## 2023-11-09 ENCOUNTER — HOSPITAL ENCOUNTER (OUTPATIENT)
Facility: HOSPITAL | Age: 48
Discharge: HOME | End: 2023-11-09
Attending: OBSTETRICS & GYNECOLOGY
Payer: COMMERCIAL

## 2023-11-09 VITALS — WEIGHT: 200 LBS | BODY MASS INDEX: 28 KG/M2 | HEIGHT: 71 IN

## 2023-11-09 DIAGNOSIS — Z12.31 VISIT FOR SCREENING MAMMOGRAM: ICD-10-CM

## 2023-11-09 PROCEDURE — 77063 BREAST TOMOSYNTHESIS BI: CPT

## 2024-01-16 ENCOUNTER — OFFICE VISIT (OUTPATIENT)
Age: 49
End: 2024-01-16
Payer: COMMERCIAL

## 2024-01-16 VITALS — BODY MASS INDEX: 30.54 KG/M2 | WEIGHT: 219 LBS

## 2024-01-16 DIAGNOSIS — Z01.419 WELL WOMAN EXAM: Primary | ICD-10-CM

## 2024-01-16 PROCEDURE — 99396 PREV VISIT EST AGE 40-64: CPT | Performed by: OBSTETRICS & GYNECOLOGY

## 2024-01-16 NOTE — PROGRESS NOTES
examination  Amenorrhea s/p endometrial ablation  20lb weight gain past year; on metformin  Her current medical status is satisfactory with no evidence of significant gynecologic issues.    Plan:  Patient declines presence of chaperone during today's visit  Pap done today  Recommend endocrine evaluation  Counseled re: diet, exercise, healthy lifestyle  Return for yearly wellness visits  Rec annual mammogramram

## 2024-01-17 LAB
., LABCORP: NORMAL
CYTOLOGIST CVX/VAG CYTO: NORMAL
CYTOLOGY CVX/VAG DOC CYTO: NORMAL
CYTOLOGY CVX/VAG DOC THIN PREP: NORMAL
DX ICD CODE: NORMAL
Lab: NORMAL
OTHER STN SPEC: NORMAL
STAT OF ADQ CVX/VAG CYTO-IMP: NORMAL

## 2024-03-13 ENCOUNTER — TELEPHONE (OUTPATIENT)
Age: 49
End: 2024-03-13

## 2024-03-13 ENCOUNTER — OFFICE VISIT (OUTPATIENT)
Age: 49
End: 2024-03-13
Payer: COMMERCIAL

## 2024-03-13 ENCOUNTER — PREP FOR PROCEDURE (OUTPATIENT)
Age: 49
End: 2024-03-13

## 2024-03-13 VITALS
SYSTOLIC BLOOD PRESSURE: 100 MMHG | RESPIRATION RATE: 16 BRPM | HEIGHT: 71 IN | DIASTOLIC BLOOD PRESSURE: 65 MMHG | HEART RATE: 66 BPM | TEMPERATURE: 98.3 F | BODY MASS INDEX: 31.67 KG/M2 | OXYGEN SATURATION: 98 % | WEIGHT: 226.2 LBS

## 2024-03-13 DIAGNOSIS — K43.9 VENTRAL HERNIA WITHOUT OBSTRUCTION OR GANGRENE: Primary | ICD-10-CM

## 2024-03-13 PROCEDURE — 99213 OFFICE O/P EST LOW 20 MIN: CPT | Performed by: SURGERY

## 2024-03-13 ASSESSMENT — PATIENT HEALTH QUESTIONNAIRE - PHQ9
SUM OF ALL RESPONSES TO PHQ9 QUESTIONS 1 & 2: 0
SUM OF ALL RESPONSES TO PHQ QUESTIONS 1-9: 0
2. FEELING DOWN, DEPRESSED OR HOPELESS: 0
1. LITTLE INTEREST OR PLEASURE IN DOING THINGS: 0

## 2024-03-13 ASSESSMENT — ANXIETY QUESTIONNAIRES
2. NOT BEING ABLE TO STOP OR CONTROL WORRYING: 0
5. BEING SO RESTLESS THAT IT IS HARD TO SIT STILL: 0
6. BECOMING EASILY ANNOYED OR IRRITABLE: 0
1. FEELING NERVOUS, ANXIOUS, OR ON EDGE: 0
IF YOU CHECKED OFF ANY PROBLEMS ON THIS QUESTIONNAIRE, HOW DIFFICULT HAVE THESE PROBLEMS MADE IT FOR YOU TO DO YOUR WORK, TAKE CARE OF THINGS AT HOME, OR GET ALONG WITH OTHER PEOPLE: NOT DIFFICULT AT ALL
7. FEELING AFRAID AS IF SOMETHING AWFUL MIGHT HAPPEN: 0
4. TROUBLE RELAXING: 0
3. WORRYING TOO MUCH ABOUT DIFFERENT THINGS: 0
GAD7 TOTAL SCORE: 0

## 2024-03-13 NOTE — TELEPHONE ENCOUNTER
Returned call to Ms Sargent verified patient with 2 patient identifiers.     Reviewed notes patient is scheduled for open Ventral Hernia repair surgery on 4/11/24.     Patient wants to know if she can take the following medications prior to the procedure.     Vit D  Sertraline  Pantoprazole    I informed patient the Vit D should be stopped 1 week prior as per DALE Agustin. I informed patient PAT will call to schedule appointment they will discuss which medications to hold and take.

## 2024-03-13 NOTE — TELEPHONE ENCOUNTER
Contacted patient regarding scheduling surgery with Dr. Phillips on 04/11/2024. Informed patient about PAT and surgical letter being sent out as well. Patient had questions regarding if she can take certain medications the day of surgery. Informed her that I'll send a message over to Dr. Phillips's nurse.

## 2024-03-13 NOTE — PROGRESS NOTES
Identified patient with two patient identifiers (name and ). Reviewed chart in preparation for visit and have obtained necessary documentation.    Shweta Sargent is a 48 y.o. female  Chief Complaint   Patient presents with    New Patient     Ventral hernia      /65 (Site: Right Upper Arm, Position: Sitting, Cuff Size: Medium Adult)   Pulse 66   Temp 98.3 °F (36.8 °C) (Oral)   Resp 16   Ht 1.803 m (5' 11\")   Wt 102.6 kg (226 lb 3.2 oz)   SpO2 98%   BMI 31.55 kg/m²     1. Have you been to the ER, urgent care clinic since your last visit?  Hospitalized since your last visit?no    2. Have you seen or consulted any other health care providers outside of the [facility] since your last visit?  Include any pap smears or colon screening. no  
(ZOLOFT) 50 MG tablet, Take by mouth daily, Disp: , Rfl:     Allergies   Allergen Reactions    Amoxicillin Other (See Comments)     Migraines       Social History     Socioeconomic History    Marital status: Single     Spouse name: Not on file    Number of children: Not on file    Years of education: Not on file    Highest education level: Not on file   Occupational History    Not on file   Tobacco Use    Smoking status: Never    Smokeless tobacco: Never   Substance and Sexual Activity    Alcohol use: No    Drug use: No    Sexual activity: Not Currently     Partners: Male     Birth control/protection: None   Other Topics Concern    Not on file   Social History Narrative    Not on file     Social Determinants of Health     Financial Resource Strain: Not on file   Food Insecurity: Not on file   Transportation Needs: Not on file   Physical Activity: Not on file   Stress: Not on file   Social Connections: Not on file   Intimate Partner Violence: Not on file   Housing Stability: Not on file       Family History   Problem Relation Age of Onset    Breast Cancer Maternal Aunt         postmen    Breast Cancer Paternal Grandmother         postmen    Deep Vein Thrombosis Brother     Bleeding Prob Brother         blood clot after surgery    Cancer Father         prostate & skin    Hypertension Father     Hypertension Mother     Diabetes Mother     Cancer Mother         soft tissue sarcoma    Anesth Problems Neg Hx        ROS   Constitutional: negative  Ears, Nose, Mouth, Throat, and Face: negative  Respiratory: negative  Cardiovascular: negative  Gastrointestinal:  Ventral hernia  Genitourinary:negative  Integument/Breast: negative  Hematologic/Lymphatic: negative  Behavioral/Psychiatric: negative  Allergic/Immunologic: negative      Physical Exam:     Vitals:    03/13/24 0928   BP: 100/65   Pulse: 66   Resp: 16   Temp: 98.3 °F (36.8 °C)   SpO2: 98%     Last Weight Metrics:      3/13/2024     9:28 AM 1/16/2024     2:32 PM

## 2024-03-20 ENCOUNTER — TELEPHONE (OUTPATIENT)
Age: 49
End: 2024-03-20

## 2024-03-20 NOTE — TELEPHONE ENCOUNTER
Returned call to Ms Sargent verified patient with 2 patient identifies.     I informed patient per Dr Phillips no PAT is needed.

## 2024-03-25 ENCOUNTER — TELEPHONE (OUTPATIENT)
Age: 49
End: 2024-03-25

## 2024-03-25 NOTE — TELEPHONE ENCOUNTER
Contacted patient to see if she wanted to have her surgery on 04/09 instead. Patient stated she will give me a call back.   
Diagnostic testing not indicated for today's encounter

## 2024-03-26 ENCOUNTER — CLINICAL DOCUMENTATION (OUTPATIENT)
Age: 49
End: 2024-03-26

## 2024-03-26 NOTE — PROGRESS NOTES
[facility] paperwork was faxed to [facility] with confirmation received the release.

## 2024-04-04 RX ORDER — FERROUS SULFATE 325(65) MG
325 TABLET ORAL
COMMUNITY

## 2024-04-04 RX ORDER — ASCORBIC ACID 500 MG
500 TABLET ORAL DAILY
COMMUNITY

## 2024-04-10 ENCOUNTER — ANESTHESIA EVENT (OUTPATIENT)
Facility: HOSPITAL | Age: 49
End: 2024-04-10
Payer: COMMERCIAL

## 2024-04-11 ENCOUNTER — HOSPITAL ENCOUNTER (OUTPATIENT)
Facility: HOSPITAL | Age: 49
Setting detail: OUTPATIENT SURGERY
Discharge: HOME | End: 2024-04-11
Attending: SURGERY | Admitting: SURGERY
Payer: COMMERCIAL

## 2024-04-11 ENCOUNTER — ANESTHESIA (OUTPATIENT)
Facility: HOSPITAL | Age: 49
End: 2024-04-11
Payer: COMMERCIAL

## 2024-04-11 VITALS
DIASTOLIC BLOOD PRESSURE: 80 MMHG | BODY MASS INDEX: 29.4 KG/M2 | WEIGHT: 210 LBS | HEART RATE: 60 BPM | SYSTOLIC BLOOD PRESSURE: 116 MMHG | OXYGEN SATURATION: 97 % | HEIGHT: 71 IN | RESPIRATION RATE: 12 BRPM | TEMPERATURE: 98.6 F

## 2024-04-11 DIAGNOSIS — K43.9 VENTRAL HERNIA WITHOUT OBSTRUCTION OR GANGRENE: Primary | ICD-10-CM

## 2024-04-11 LAB
GLUCOSE BLD STRIP.AUTO-MCNC: 86 MG/DL (ref 65–117)
SERVICE CMNT-IMP: NORMAL

## 2024-04-11 PROCEDURE — 6360000002 HC RX W HCPCS: Performed by: ANESTHESIOLOGY

## 2024-04-11 PROCEDURE — 7100000010 HC PHASE II RECOVERY - FIRST 15 MIN: Performed by: SURGERY

## 2024-04-11 PROCEDURE — 82962 GLUCOSE BLOOD TEST: CPT

## 2024-04-11 PROCEDURE — 3600000002 HC SURGERY LEVEL 2 BASE: Performed by: SURGERY

## 2024-04-11 PROCEDURE — 2580000003 HC RX 258: Performed by: ANESTHESIOLOGY

## 2024-04-11 PROCEDURE — 6370000000 HC RX 637 (ALT 250 FOR IP): Performed by: ANESTHESIOLOGY

## 2024-04-11 PROCEDURE — 3600000012 HC SURGERY LEVEL 2 ADDTL 15MIN: Performed by: SURGERY

## 2024-04-11 PROCEDURE — 2709999900 HC NON-CHARGEABLE SUPPLY: Performed by: SURGERY

## 2024-04-11 PROCEDURE — 6360000002 HC RX W HCPCS

## 2024-04-11 PROCEDURE — 49592 RPR AA HRN 1ST < 3 NCR/STRN: CPT | Performed by: SURGERY

## 2024-04-11 PROCEDURE — 3700000001 HC ADD 15 MINUTES (ANESTHESIA): Performed by: SURGERY

## 2024-04-11 PROCEDURE — 6360000002 HC RX W HCPCS: Performed by: SURGERY

## 2024-04-11 PROCEDURE — 2580000003 HC RX 258: Performed by: SURGERY

## 2024-04-11 PROCEDURE — 3700000000 HC ANESTHESIA ATTENDED CARE: Performed by: SURGERY

## 2024-04-11 PROCEDURE — C1765 ADHESION BARRIER: HCPCS | Performed by: SURGERY

## 2024-04-11 PROCEDURE — 6360000002 HC RX W HCPCS: Performed by: NURSE ANESTHETIST, CERTIFIED REGISTERED

## 2024-04-11 PROCEDURE — 7100000001 HC PACU RECOVERY - ADDTL 15 MIN: Performed by: SURGERY

## 2024-04-11 PROCEDURE — 2500000003 HC RX 250 WO HCPCS: Performed by: NURSE ANESTHETIST, CERTIFIED REGISTERED

## 2024-04-11 PROCEDURE — 6370000000 HC RX 637 (ALT 250 FOR IP): Performed by: SURGERY

## 2024-04-11 PROCEDURE — 7100000000 HC PACU RECOVERY - FIRST 15 MIN: Performed by: SURGERY

## 2024-04-11 PROCEDURE — 2500000003 HC RX 250 WO HCPCS: Performed by: SURGERY

## 2024-04-11 RX ORDER — DEXAMETHASONE SODIUM PHOSPHATE 4 MG/ML
INJECTION, SOLUTION INTRA-ARTICULAR; INTRALESIONAL; INTRAMUSCULAR; INTRAVENOUS; SOFT TISSUE PRN
Status: DISCONTINUED | OUTPATIENT
Start: 2024-04-11 | End: 2024-04-11 | Stop reason: SDUPTHER

## 2024-04-11 RX ORDER — GLYCOPYRROLATE 0.2 MG/ML
INJECTION INTRAMUSCULAR; INTRAVENOUS PRN
Status: DISCONTINUED | OUTPATIENT
Start: 2024-04-11 | End: 2024-04-11 | Stop reason: SDUPTHER

## 2024-04-11 RX ORDER — SODIUM CHLORIDE 9 MG/ML
INJECTION, SOLUTION INTRAVENOUS PRN
Status: DISCONTINUED | OUTPATIENT
Start: 2024-04-11 | End: 2024-04-11 | Stop reason: HOSPADM

## 2024-04-11 RX ORDER — MEPERIDINE HYDROCHLORIDE 25 MG/ML
INJECTION INTRAMUSCULAR; INTRAVENOUS; SUBCUTANEOUS
Status: COMPLETED
Start: 2024-04-11 | End: 2024-04-11

## 2024-04-11 RX ORDER — NALOXONE HYDROCHLORIDE 0.4 MG/ML
INJECTION, SOLUTION INTRAMUSCULAR; INTRAVENOUS; SUBCUTANEOUS PRN
Status: DISCONTINUED | OUTPATIENT
Start: 2024-04-11 | End: 2024-04-11 | Stop reason: HOSPADM

## 2024-04-11 RX ORDER — PROCHLORPERAZINE EDISYLATE 5 MG/ML
5 INJECTION INTRAMUSCULAR; INTRAVENOUS
Status: DISCONTINUED | OUTPATIENT
Start: 2024-04-11 | End: 2024-04-11 | Stop reason: HOSPADM

## 2024-04-11 RX ORDER — HYDROMORPHONE HYDROCHLORIDE 1 MG/ML
0.5 INJECTION, SOLUTION INTRAMUSCULAR; INTRAVENOUS; SUBCUTANEOUS EVERY 5 MIN PRN
Status: DISCONTINUED | OUTPATIENT
Start: 2024-04-11 | End: 2024-04-11 | Stop reason: HOSPADM

## 2024-04-11 RX ORDER — IBUPROFEN 600 MG/1
600 TABLET ORAL 3 TIMES DAILY PRN
Qty: 30 TABLET | Refills: 0 | Status: SHIPPED | OUTPATIENT
Start: 2024-04-11

## 2024-04-11 RX ORDER — TRAMADOL HYDROCHLORIDE 50 MG/1
50 TABLET ORAL EVERY 4 HOURS PRN
Qty: 18 TABLET | Refills: 0 | Status: SHIPPED | OUTPATIENT
Start: 2024-04-11 | End: 2024-04-14

## 2024-04-11 RX ORDER — SODIUM CHLORIDE 0.9 % (FLUSH) 0.9 %
5-40 SYRINGE (ML) INJECTION PRN
Status: DISCONTINUED | OUTPATIENT
Start: 2024-04-11 | End: 2024-04-11 | Stop reason: HOSPADM

## 2024-04-11 RX ORDER — KETAMINE HCL IN NACL, ISO-OSM 100MG/10ML
SYRINGE (ML) INJECTION PRN
Status: DISCONTINUED | OUTPATIENT
Start: 2024-04-11 | End: 2024-04-11 | Stop reason: SDUPTHER

## 2024-04-11 RX ORDER — HYDRALAZINE HYDROCHLORIDE 20 MG/ML
10 INJECTION INTRAMUSCULAR; INTRAVENOUS
Status: DISCONTINUED | OUTPATIENT
Start: 2024-04-11 | End: 2024-04-11 | Stop reason: HOSPADM

## 2024-04-11 RX ORDER — FENTANYL CITRATE 50 UG/ML
25 INJECTION, SOLUTION INTRAMUSCULAR; INTRAVENOUS EVERY 5 MIN PRN
Status: DISCONTINUED | OUTPATIENT
Start: 2024-04-11 | End: 2024-04-11

## 2024-04-11 RX ORDER — FENTANYL CITRATE 50 UG/ML
25 INJECTION, SOLUTION INTRAMUSCULAR; INTRAVENOUS EVERY 5 MIN PRN
Status: COMPLETED | OUTPATIENT
Start: 2024-04-11 | End: 2024-04-11

## 2024-04-11 RX ORDER — SODIUM CHLORIDE 0.9 % (FLUSH) 0.9 %
5-40 SYRINGE (ML) INJECTION EVERY 12 HOURS SCHEDULED
Status: DISCONTINUED | OUTPATIENT
Start: 2024-04-11 | End: 2024-04-11 | Stop reason: HOSPADM

## 2024-04-11 RX ORDER — ONDANSETRON 2 MG/ML
4 INJECTION INTRAMUSCULAR; INTRAVENOUS
Status: DISCONTINUED | OUTPATIENT
Start: 2024-04-11 | End: 2024-04-11 | Stop reason: HOSPADM

## 2024-04-11 RX ORDER — BUPIVACAINE HYDROCHLORIDE AND EPINEPHRINE 5; 5 MG/ML; UG/ML
INJECTION, SOLUTION PERINEURAL PRN
Status: DISCONTINUED | OUTPATIENT
Start: 2024-04-11 | End: 2024-04-11 | Stop reason: HOSPADM

## 2024-04-11 RX ORDER — SODIUM CHLORIDE, SODIUM LACTATE, POTASSIUM CHLORIDE, CALCIUM CHLORIDE 600; 310; 30; 20 MG/100ML; MG/100ML; MG/100ML; MG/100ML
INJECTION, SOLUTION INTRAVENOUS CONTINUOUS
Status: DISCONTINUED | OUTPATIENT
Start: 2024-04-11 | End: 2024-04-11 | Stop reason: HOSPADM

## 2024-04-11 RX ORDER — OXYCODONE HYDROCHLORIDE 5 MG/1
5 TABLET ORAL
Status: DISCONTINUED | OUTPATIENT
Start: 2024-04-11 | End: 2024-04-11 | Stop reason: HOSPADM

## 2024-04-11 RX ORDER — LIDOCAINE HYDROCHLORIDE 10 MG/ML
1 INJECTION, SOLUTION EPIDURAL; INFILTRATION; INTRACAUDAL; PERINEURAL
Status: DISCONTINUED | OUTPATIENT
Start: 2024-04-11 | End: 2024-04-11 | Stop reason: HOSPADM

## 2024-04-11 RX ORDER — FENTANYL CITRATE 50 UG/ML
INJECTION, SOLUTION INTRAMUSCULAR; INTRAVENOUS PRN
Status: DISCONTINUED | OUTPATIENT
Start: 2024-04-11 | End: 2024-04-11 | Stop reason: SDUPTHER

## 2024-04-11 RX ORDER — ONDANSETRON 2 MG/ML
INJECTION INTRAMUSCULAR; INTRAVENOUS PRN
Status: DISCONTINUED | OUTPATIENT
Start: 2024-04-11 | End: 2024-04-11 | Stop reason: SDUPTHER

## 2024-04-11 RX ORDER — TRAMADOL HYDROCHLORIDE 50 MG/1
50 TABLET ORAL ONCE
Status: COMPLETED | OUTPATIENT
Start: 2024-04-11 | End: 2024-04-11

## 2024-04-11 RX ORDER — ROCURONIUM BROMIDE 10 MG/ML
INJECTION, SOLUTION INTRAVENOUS PRN
Status: DISCONTINUED | OUTPATIENT
Start: 2024-04-11 | End: 2024-04-11 | Stop reason: SDUPTHER

## 2024-04-11 RX ORDER — LIDOCAINE HYDROCHLORIDE 20 MG/ML
INJECTION, SOLUTION EPIDURAL; INFILTRATION; INTRACAUDAL; PERINEURAL PRN
Status: DISCONTINUED | OUTPATIENT
Start: 2024-04-11 | End: 2024-04-11 | Stop reason: SDUPTHER

## 2024-04-11 RX ORDER — FENTANYL CITRATE 50 UG/ML
100 INJECTION, SOLUTION INTRAMUSCULAR; INTRAVENOUS
Status: DISCONTINUED | OUTPATIENT
Start: 2024-04-11 | End: 2024-04-11 | Stop reason: HOSPADM

## 2024-04-11 RX ORDER — MIDAZOLAM HYDROCHLORIDE 1 MG/ML
INJECTION INTRAMUSCULAR; INTRAVENOUS PRN
Status: DISCONTINUED | OUTPATIENT
Start: 2024-04-11 | End: 2024-04-11 | Stop reason: SDUPTHER

## 2024-04-11 RX ORDER — SUCCINYLCHOLINE/SOD CL,ISO/PF 200MG/10ML
SYRINGE (ML) INTRAVENOUS PRN
Status: DISCONTINUED | OUTPATIENT
Start: 2024-04-11 | End: 2024-04-11 | Stop reason: SDUPTHER

## 2024-04-11 RX ORDER — MIDAZOLAM HYDROCHLORIDE 2 MG/2ML
2 INJECTION, SOLUTION INTRAMUSCULAR; INTRAVENOUS
Status: DISCONTINUED | OUTPATIENT
Start: 2024-04-11 | End: 2024-04-11 | Stop reason: HOSPADM

## 2024-04-11 RX ORDER — MEPERIDINE HYDROCHLORIDE 25 MG/ML
12.5 INJECTION INTRAMUSCULAR; INTRAVENOUS; SUBCUTANEOUS ONCE
Status: COMPLETED | OUTPATIENT
Start: 2024-04-11 | End: 2024-04-11

## 2024-04-11 RX ORDER — ACETAMINOPHEN 500 MG
1000 TABLET ORAL ONCE
Status: COMPLETED | OUTPATIENT
Start: 2024-04-11 | End: 2024-04-11

## 2024-04-11 RX ADMIN — FENTANYL CITRATE 25 MCG: 50 INJECTION INTRAMUSCULAR; INTRAVENOUS at 12:56

## 2024-04-11 RX ADMIN — TRAMADOL HYDROCHLORIDE 50 MG: 50 TABLET ORAL at 13:22

## 2024-04-11 RX ADMIN — DEXAMETHASONE SODIUM PHOSPHATE 8 MG: 4 INJECTION, SOLUTION INTRAMUSCULAR; INTRAVENOUS at 11:26

## 2024-04-11 RX ADMIN — ACETAMINOPHEN 1000 MG: 500 TABLET ORAL at 09:48

## 2024-04-11 RX ADMIN — Medication 25 MG: at 11:09

## 2024-04-11 RX ADMIN — FENTANYL CITRATE 25 MCG: 50 INJECTION INTRAMUSCULAR; INTRAVENOUS at 13:05

## 2024-04-11 RX ADMIN — SODIUM CHLORIDE, POTASSIUM CHLORIDE, SODIUM LACTATE AND CALCIUM CHLORIDE: 600; 310; 30; 20 INJECTION, SOLUTION INTRAVENOUS at 10:04

## 2024-04-11 RX ADMIN — ONDANSETRON 4 MG: 2 INJECTION INTRAMUSCULAR; INTRAVENOUS at 11:51

## 2024-04-11 RX ADMIN — PROPOFOL 200 MG: 10 INJECTION, EMULSION INTRAVENOUS at 11:09

## 2024-04-11 RX ADMIN — LIDOCAINE HYDROCHLORIDE 50 MG: 20 INJECTION, SOLUTION EPIDURAL; INFILTRATION; INTRACAUDAL; PERINEURAL at 11:09

## 2024-04-11 RX ADMIN — Medication 25 MG: at 11:38

## 2024-04-11 RX ADMIN — ROCURONIUM BROMIDE 20 MG: 10 SOLUTION INTRAVENOUS at 11:38

## 2024-04-11 RX ADMIN — FENTANYL CITRATE 25 MCG: 50 INJECTION INTRAMUSCULAR; INTRAVENOUS at 12:39

## 2024-04-11 RX ADMIN — FENTANYL CITRATE 25 MCG: 50 INJECTION INTRAMUSCULAR; INTRAVENOUS at 12:31

## 2024-04-11 RX ADMIN — MIDAZOLAM 2 MG: 1 INJECTION INTRAMUSCULAR; INTRAVENOUS at 11:01

## 2024-04-11 RX ADMIN — Medication 160 MG: at 11:09

## 2024-04-11 RX ADMIN — SODIUM CHLORIDE, POTASSIUM CHLORIDE, SODIUM LACTATE AND CALCIUM CHLORIDE: 600; 310; 30; 20 INJECTION, SOLUTION INTRAVENOUS at 10:11

## 2024-04-11 RX ADMIN — WATER 2000 MG: 1 INJECTION INTRAMUSCULAR; INTRAVENOUS; SUBCUTANEOUS at 11:09

## 2024-04-11 RX ADMIN — ROCURONIUM BROMIDE 10 MG: 10 SOLUTION INTRAVENOUS at 11:09

## 2024-04-11 RX ADMIN — ROCURONIUM BROMIDE 40 MG: 10 SOLUTION INTRAVENOUS at 11:16

## 2024-04-11 RX ADMIN — Medication 50 MG: at 11:08

## 2024-04-11 RX ADMIN — MEPERIDINE HYDROCHLORIDE 12.5 MG: 25 INJECTION INTRAMUSCULAR; INTRAVENOUS; SUBCUTANEOUS at 12:33

## 2024-04-11 RX ADMIN — GLYCOPYRROLATE 0.2 MG: 0.2 INJECTION INTRAMUSCULAR; INTRAVENOUS at 11:01

## 2024-04-11 RX ADMIN — SUGAMMADEX 200 MG: 100 INJECTION, SOLUTION INTRAVENOUS at 11:57

## 2024-04-11 RX ADMIN — FENTANYL CITRATE 50 MCG: 50 INJECTION, SOLUTION INTRAMUSCULAR; INTRAVENOUS at 11:01

## 2024-04-11 RX ADMIN — FENTANYL CITRATE 50 MCG: 50 INJECTION, SOLUTION INTRAMUSCULAR; INTRAVENOUS at 11:08

## 2024-04-11 ASSESSMENT — PAIN DESCRIPTION - LOCATION
LOCATION: ABDOMEN

## 2024-04-11 ASSESSMENT — PAIN - FUNCTIONAL ASSESSMENT: PAIN_FUNCTIONAL_ASSESSMENT: 0-10

## 2024-04-11 ASSESSMENT — PAIN DESCRIPTION - DESCRIPTORS
DESCRIPTORS: ACHING

## 2024-04-11 ASSESSMENT — PAIN SCALES - GENERAL
PAINLEVEL_OUTOF10: 4
PAINLEVEL_OUTOF10: 5

## 2024-04-11 ASSESSMENT — PAIN DESCRIPTION - ORIENTATION: ORIENTATION: MID

## 2024-04-11 NOTE — H&P
Left message for pt to return call to clinic.   [facility] [place] Surgery History and Physical     History of Present Illness:      Shweta Sargent is a 48 y.o. female who I saw before a few years ago for a small ventral hernia.  She elected to not have surgery.  The hernia she thinks maybe has gotten a little bit bigger but she is having a little bit more pain from the pain when she has it is a 1 out of 10.  She denies any changes in bowel movements nausea or vomiting.     Past Medical History        Past Medical History:   Diagnosis Date    Adverse effect of anesthesia       BENZOCAINE ALOE VERA DROPS HR INTO THE 30'S    Anemia      Arthritis       Rt  Knee    Basal cell carcinoma 2012    Complication of anesthesia       decreased HR    GERD (gastroesophageal reflux disease)      Headache       had covid 2020    Ill-defined condition       MYOCARDITIS FROM COVID    Ill-defined condition 2020     COVID     Infertility, female       6 rounds of IUI - not for infertility, but no partner    Myocarditis (HCC)      Psychiatric disorder       ANXIETY    Psychiatric problem              Past Surgical History         Past Surgical History:   Procedure Laterality Date     SECTION   2017    GYN   2015     UTERINE POLYP    HEENT         WISDOM TEETH REMOVED    KNEE ARTHROSCOPY Right 2000    MALIGNANT SKIN LESION EXCISION   10/2019    MALIGNANT SKIN LESION EXCISION   2019    MYOMECTOMY        OTHER SURGICAL HISTORY         SKIN CANCER REMOVED FROM NOSE FOREHEAD NECK CHEST BACK AND STOACH    SKIN BIOPSY        Basal cell carcinoma removed from face, nose, L and R arm and abdomen    WISDOM TOOTH EXTRACTION                  Current Medication      Current Outpatient Medications:     Cholecalciferol (VITAMIN D) 10 MCG (400 UNIT) CAPS Capsule, Vitamin D3, Disp: , Rfl:     Cobalamin Combinations (VITAMIN B12-FOLIC ACID) 500-400 MCG TABS, Vitamin B12, Disp: , Rfl:     metFORMIN (GLUCOPHAGE-XR) 500 MG extended release tablet, 2

## 2024-04-11 NOTE — OP NOTE
[address]  [address]                            OPERATIVE REPORT      PATIENT NAME: ALLY QUINTERO               : 1975  MED REC NO: [id number]                       ROOM: OR  ACCOUNT NO: [id number]                       ADMIT DATE: 2024  PROVIDER: Fam Phillips MD    DATE OF SERVICE:  2024    PREOPERATIVE DIAGNOSES:  Incarcerated ventral hernia.    POSTOPERATIVE DIAGNOSES:  Incarcerated ventral hernia.    PROCEDURES PERFORMED:  Open incarcerated ventral hernia repair.    SURGEON:  Fam Phillips MD    ASSISTANT:  Surgical assistant, Cindy Aguilar.    ANESTHESIA:  General.    ESTIMATED BLOOD LOSS:  Minimal.    SPECIMENS REMOVED:  None.    INTRAOPERATIVE FINDINGS:  1.5 cm incarcerated ventral hernia with preperitoneal fat a few centimeters above the umbilicus and the defect was repaired primarily.     COMPLICATIONS:  None.    IMPLANTS:  None.    INDICATIONS:  The patient is a 48-year-old female, who has an incarcerated ventral hernia just above the umbilicus that is needing repair in the operating room.    DESCRIPTION OF PROCEDURE:  The patient was met in the preoperative holding area.  H and P was updated.  Consent was signed.  All risks and benefits were explained to the patient prior to the start of the operation.  She was taken back to the operating room.  She was lying in the supine position.  The abdomen was prepped and draped in standard sterile fashion.  Time-out was called.  Antibiotics were given.  SCDs were on lower extremities.  I started the operation by making a supraumbilical incision just on the edge of the superior aspect of the umbilicus in a curvilinear fashion.  We dissected through the subcutaneous tissue with the Bovie cautery after making the incision with a scalpel.  We had also used local anesthesia as well.  I then dissected through the subcutaneous tissue, encountering the capsule of the hernia sac.

## 2024-04-11 NOTE — ANESTHESIA POSTPROCEDURE EVALUATION
Post-Anesthesia Evaluation and Assessment    Patient: Shweta Sargent MRN: [id number]  SSN: [id number]    YOB: 1975  Age: 48 y.o.  Sex: female      I have evaluated the patient and they are stable and ready for discharge from the PACU.     Cardiovascular Function/Vital Signs  Visit Vitals  /80   Pulse 60   Temp 98.6 °F (37 °C) (Oral)   Resp 12   Ht 1.803 m (5' 11\")   Wt 95.3 kg (210 lb)   SpO2 97%   BMI 29.29 kg/m²       Patient is status post General anesthesia for Procedure(s):  OPEN VENTRAL HERNIA REPAIR.    Nausea/Vomiting: None    Postoperative hydration reviewed and adequate.    Pain:  Managed    Neurological Status:   At baseline    Mental Status, Level of Consciousness: Alert and  oriented to person, place, and time    Pulmonary Status:   Adequate oxygenation and airway patent    Complications related to anesthesia: None    Post-anesthesia assessment completed. No concerns    Signed By: Pratik Villareal MD     April 11, 2024

## 2024-04-11 NOTE — BRIEF OP NOTE
Brief Postoperative Note      Patient: Shweta Sargent  YOB: 1975  MRN: [id number]    Date of Procedure: 4/11/2024    Pre-Op Diagnosis Codes:     * Hernia, ventral [K43.9]    Post-Op Diagnosis: Same       Procedure(s):  OPEN INCARCERATED VENTRAL HERNIA REPAIR    Surgeon(s):  Fam Phillips MD    Assistant:  Surgical Assistant: Cindy Aguilar    Anesthesia: General    Estimated Blood Loss (mL): Minimal    Complications: None    Specimens:   * No specimens in log *    Implants:  * No implants in log *      Drains: * No LDAs found *    Findings:  Infection Present At Time Of Surgery (PATOS) (choose all levels that have infection present):  No infection present  Other Findings: 1.5cm ventral hernia a few cm above the umbilicus with incarcerated preperitoneal fat in the defect, repaired primarily    Electronically signed by Fam Phillips MD on 4/11/2024 at 11:52 AM

## 2024-04-11 NOTE — PERIOP NOTE
Verbal order for 12.5 mg of IV Demerol once by Dr. Villareal.  
Verbal order for 12.5 mg of IV Demerol once by Dr. Villareal.      
to STOP taking these medications: .  (If you are currently taking Plavix, Coumadin,or any other blood-thinning/anticoagulant medication contact your prescribing physician for instructions).  STOP VITAMIN B12, IRON, VITAMIN D, VITAMIN C ON 4/4/24  Stop all vitamins, herbal medicines and Aspirin containing products 7 days prior to surgery. Stop any non-steroidal anti-inflammatory drugs (i.e. Ibuprofen, Naproxen, Advil, Aleve) 3 days before surgery. You may take Tylenol.              Preventing Infections Before and After - Your Surgery    IMPORTANT INSTRUCTIONS    You play an important role in your health and preparation for surgery. To reduce the germs on your skin you will need to shower with CHG soap (Chorhexidine gluconate 4%) two times before surgery.    CHG soap (Hibiclens, Hex-A-Clens or store brand)  CHG soap will be provided at your Preadmission Testing (PAT) appointment.  If you do not have a PAT appointment before surgery, you may arrange to  CHG soap from our office or purchase CHG soap at a pharmacy, grocery or department store.  You need to purchase TWO 4 ounce bottles to use for your 2 showers.    Steps to follow:  Wash your hair with your normal shampoo and your body with regular soap and rinse well to remove shampoo and soap from your skin.  Wet a clean washcloth and turn off the shower.  Put CHG soap on washcloth and apply to your entire body from the neck down. Do not use on your head, face or private parts(genitals). Do not use CHG soap on open sores, wounds or areas of skin irritation.  Wash you body gently for 5 minutes. Do not wash your skin too hard. This soap does not create lather. Pay special attention to your underarms and from your belly button to your feet.  Turn the shower back on and rinse well to get CHG soap off your body.  Pat your skin dry with a clean, dry towel. Do not apply lotions or moisturizer.  Put on clean clothes and sleep on fresh bed sheets and do not allow pets to

## 2024-04-11 NOTE — ANESTHESIA PRE PROCEDURE
Department of Anesthesiology  Preprocedure Note       Name:  Shweta Sargent   Age:  48 y.o.  :  1975                                          MRN:  [id number]         Date:  2024      Surgeon: Surgeon(s):  Fam Phillips MD    Procedure: Procedure(s):  OPEN VENTRAL HERNIA REPAIR    Medications prior to admission:   Prior to Admission medications    Medication Sig Start Date End Date Taking? Authorizing Provider   ferrous sulfate (IRON 325) 325 (65 Fe) MG tablet Take 1 tablet by mouth daily (with breakfast)   Yes Provider, Svetlana, MD   vitamin C (ASCORBIC ACID) 500 MG tablet Take 1 tablet by mouth daily   Yes Provider, Svetlana, MD   Cholecalciferol (VITAMIN D) 10 MCG (400 UNIT) CAPS Capsule Vitamin D3    Automatic Reconciliation, Ar   Cobalamin Combinations (VITAMIN B12-FOLIC ACID) 500-400 MCG TABS Vitamin B12    Automatic Reconciliation, Ar   metFORMIN (GLUCOPHAGE-XR) 500 MG extended release tablet 2 tablets every evening 23   Automatic Reconciliation, Ar   pantoprazole (PROTONIX) 40 MG tablet Take 1 tablet by mouth daily    Automatic Reconciliation, Ar   sertraline (ZOLOFT) 50 MG tablet Take by mouth daily    Automatic Reconciliation, Ar       Current medications:    No current facility-administered medications for this encounter.       Allergies:    Allergies   Allergen Reactions    Amoxicillin Other (See Comments)     Migraines       Problem List:    Patient Active Problem List   Diagnosis Code    Hernia, ventral K43.9       Past Medical History:        Diagnosis Date    Adverse effect of anesthesia     BENZOCAINE ALOE VERA DROPS HR INTO THE 30'S    Anemia     Anesthesia complication     BRADYCARDIA    Arthritis     Rt  Knee    Basal cell carcinoma     CHEST,NECK, ABD, ARMS    Complication of anesthesia     decreased HR    Diabetes mellitus (HCC)     PRE DIABETIC    GERD (gastroesophageal reflux disease)     Headache     had covid 2020    Ill-defined condition     MYOCARDITIS FROM

## 2024-04-15 ENCOUNTER — TELEPHONE (OUTPATIENT)
Age: 49
End: 2024-04-15

## 2024-04-15 RX ORDER — METHYLPREDNISOLONE 4 MG/1
TABLET ORAL
Qty: 1 KIT | Refills: 0 | Status: SHIPPED | OUTPATIENT
Start: 2024-04-15 | End: 2024-04-21

## 2024-04-15 NOTE — TELEPHONE ENCOUNTER
Returned call to Ms Sargent verified patient with 2 patient identifiers.     Patient made aware Dr Phillips will send Rx for medrol dose pack to the Pharmacy continue applying the hydrocortisone cream. I suggested applying a cool compress to area. The Allergy list was updated.

## 2024-04-15 NOTE — TELEPHONE ENCOUNTER
Patient stated she spoke with the on call yesterday because she has a rash all over her abdomin area. Patient was told to call back today if it has not gotten better.

## 2024-04-24 ENCOUNTER — OFFICE VISIT (OUTPATIENT)
Age: 49
End: 2024-04-24

## 2024-04-24 ENCOUNTER — CLINICAL DOCUMENTATION (OUTPATIENT)
Age: 49
End: 2024-04-24

## 2024-04-24 VITALS
HEART RATE: 73 BPM | RESPIRATION RATE: 17 BRPM | BODY MASS INDEX: 31.36 KG/M2 | HEIGHT: 71 IN | DIASTOLIC BLOOD PRESSURE: 60 MMHG | SYSTOLIC BLOOD PRESSURE: 97 MMHG | WEIGHT: 224 LBS | OXYGEN SATURATION: 97 % | TEMPERATURE: 98.7 F

## 2024-04-24 DIAGNOSIS — Z09 POSTOP CHECK: Primary | ICD-10-CM

## 2024-04-24 DIAGNOSIS — K43.9 VENTRAL HERNIA WITHOUT OBSTRUCTION OR GANGRENE: ICD-10-CM

## 2024-04-24 PROCEDURE — 99024 POSTOP FOLLOW-UP VISIT: CPT | Performed by: NURSE PRACTITIONER

## 2024-04-24 ASSESSMENT — PATIENT HEALTH QUESTIONNAIRE - PHQ9
SUM OF ALL RESPONSES TO PHQ9 QUESTIONS 1 & 2: 3
SUM OF ALL RESPONSES TO PHQ QUESTIONS 1-9: 3
2. FEELING DOWN, DEPRESSED OR HOPELESS: NEARLY EVERY DAY
SUM OF ALL RESPONSES TO PHQ QUESTIONS 1-9: 3
SUM OF ALL RESPONSES TO PHQ QUESTIONS 1-9: 3
1. LITTLE INTEREST OR PLEASURE IN DOING THINGS: NOT AT ALL
SUM OF ALL RESPONSES TO PHQ QUESTIONS 1-9: 3

## 2024-04-24 NOTE — PROGRESS NOTES
Identified pt with two pt identifiers (name and ). Reviewed chart in preparation for visit and have obtained necessary documentation.    Shweta Sargent is a 48 y.o. female  Chief Complaint   Patient presents with    Post-Op Check     2 week post OPEN VENTRAL HERNIA REPAIR     BP 97/60 (Site: Right Upper Arm, Position: Sitting, Cuff Size: Large Adult)   Pulse 73   Temp 98.7 °F (37.1 °C) (Oral)   Resp 17   Ht 1.803 m (5' 11\")   Wt 101.6 kg (224 lb)   SpO2 97%   BMI 31.24 kg/m²     1. Have you been to the ER, urgent care clinic since your last visit?  Hospitalized since your last visit?no    2. Have you seen or consulted any other health care providers outside of the [facility] since your last visit?  Include any pap smears or colon screening. no

## 2024-04-24 NOTE — PROGRESS NOTES
Subjective:      Shweta Sargent is a 48 y.o. female presents for postop care 2 weeks status post open ventral hernia repair.   Appetite is good. Eating a regular diet without difficulty.   Bowel movements are regular.  The patient is voiding without difficulty.  She is sore at her umbilicus.         Ms. Sargent has a reminder for a \"due or due soon\" health maintenance. I have asked that she contact her primary care provider for follow-up on this health maintenance.      Objective:     BP 97/60 (Site: Right Upper Arm, Position: Sitting, Cuff Size: Large Adult)   Pulse 73   Temp 98.7 °F (37.1 °C) (Oral)   Resp 17   Ht 1.803 m (5' 11\")   Wt 101.6 kg (224 lb)   SpO2 97%   BMI 31.24 kg/m²     General:  alert, cooperative   Abdomen: soft, bowel sounds active, non-tender   Incision:   healing well, no drainage, no erythema, no dehiscence, incision well approximated     Assessment:     Doing well postoperatively.    Plan:     Follow up as needed   May increase activity as tolerated.   No lifting greater than 20 lbs  x 4 weeks when she RTW on 4/29/2024.   May get incision wet.   DARWIN Weller - NP

## 2024-04-24 NOTE — PROGRESS NOTES
RTW FMLA paperwork was faxed to [facility] with RTW date 4/29/24 No Lifting Over 20 lbs for 4 weeks confirmation received.

## 2024-05-03 DIAGNOSIS — R63.5 WEIGHT GAIN: Primary | ICD-10-CM

## 2024-10-24 ENCOUNTER — OFFICE VISIT (OUTPATIENT)
Age: 49
End: 2024-10-24

## 2024-10-24 VITALS
BODY MASS INDEX: 32.68 KG/M2 | OXYGEN SATURATION: 99 % | SYSTOLIC BLOOD PRESSURE: 106 MMHG | DIASTOLIC BLOOD PRESSURE: 69 MMHG | HEART RATE: 60 BPM | HEIGHT: 71 IN | RESPIRATION RATE: 16 BRPM | WEIGHT: 233.4 LBS

## 2024-10-24 NOTE — PROGRESS NOTES
Patient sent [employer] message stating her Employer can't accommodate Light Duty she will need to be out of work an additional 4 weeks.     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 She advised to update paperwork Patient will RTW on 5/20/24.     Paperwork was refax to [employer] and [employer].   
Patient's first and last name, , procedure, and correct site confirmed prior to the start of procedure.

## 2024-10-24 NOTE — PROGRESS NOTES
Chief Complaint   Patient presents with    New Patient    NEAL       PT LIKE TO HAVE HER CORTISOL LEVEL CHECKED TO SEE WHETHER OR NOT THAT IS THE REASON WHY SHE IS UNABLE TO LOSE WEIGHT.      History of Present Illness: Shweta Sargent is a 49 y.o. female with a past medical history significant for post-COVID myocarditis and gastroparesis seen for discussion related to difficulty losing weight.    And reports weight gain, difficulty sleeping, body odor, twitching of the eyes, inability to lose weight despite no matter what she does.  She endorses snapping at times.  Was diagnosed with post-COVID gastroparesis, did have upper endoscopy.  Experiences nausea after eating, worse following COVID vaccination recently.  Has woken herself up from sleep with snoring.  Does not awaken feeling rested.  Has 2 twin boys ages 7, 1 with autism and is single mother.  Has help from her parents who live locally.  Previously took metformin at [facility] but did not lose weight with this.  No violaceous striae or recent steroids.  PCP checked thyroid function that was found to be normal as was A1c.  Does not currently have menstrual periods following ablation after polyp removal.    Past Medical History:   Diagnosis Date    Adverse effect of anesthesia     BENZOCAINE ALOE VERA DROPS HR INTO THE 30'S    Anemia     Anesthesia complication     BRADYCARDIA    Arthritis     Rt  Knee    Basal cell carcinoma 2012    CHEST,NECK, ABD, ARMS    Complication of anesthesia     decreased HR    Diabetes mellitus (HCC)     PRE DIABETIC    GERD (gastroesophageal reflux disease)     Headache     had covid July 2020    Ill-defined condition     MYOCARDITIS FROM COVID    Ill-defined condition 07/2020    COVID     Infertility, female     6 rounds of IUI - not for infertility, but no partner    Myocarditis (HCC)     Psychiatric disorder     ANXIETY    Psychiatric problem     Ventral hernia 2024     Past Surgical History:   Procedure 
yes

## 2024-11-11 ENCOUNTER — HOSPITAL ENCOUNTER (OUTPATIENT)
Facility: HOSPITAL | Age: 49
Discharge: HOME | End: 2024-11-14
Attending: OBSTETRICS & GYNECOLOGY
Payer: COMMERCIAL

## 2024-11-11 VITALS — BODY MASS INDEX: 32.2 KG/M2 | HEIGHT: 71 IN | WEIGHT: 230 LBS

## 2024-11-11 DIAGNOSIS — Z12.31 VISIT FOR SCREENING MAMMOGRAM: ICD-10-CM

## 2024-11-11 PROCEDURE — 77063 BREAST TOMOSYNTHESIS BI: CPT

## 2025-01-21 ENCOUNTER — OFFICE VISIT (OUTPATIENT)
Age: 50
End: 2025-01-21
Payer: COMMERCIAL

## 2025-01-21 VITALS
BODY MASS INDEX: 32.5 KG/M2 | SYSTOLIC BLOOD PRESSURE: 118 MMHG | OXYGEN SATURATION: 99 % | WEIGHT: 233 LBS | DIASTOLIC BLOOD PRESSURE: 72 MMHG | HEART RATE: 90 BPM

## 2025-01-21 DIAGNOSIS — Z01.419 WELL WOMAN EXAM: Primary | ICD-10-CM

## 2025-01-21 PROCEDURE — 99396 PREV VISIT EST AGE 40-64: CPT | Performed by: OBSTETRICS & GYNECOLOGY

## 2025-01-21 NOTE — PROGRESS NOTES
Annual exam    Here for annual with concerns noted in nursing note.    Doing well, no cycles since edometrial ablation    working with weight and wellness.  Twin boys busy but doing great    Past Surgical History:   Procedure Laterality Date     SECTION  2017    COLONOSCOPY      ENDOSCOPY, COLON, DIAGNOSTIC      GYN      UTERINE POLYP    HEENT      WISDOM TEETH REMOVED    KNEE ARTHROSCOPY Right 2000    MALIGNANT SKIN LESION EXCISION  10/2019    MALIGNANT SKIN LESION EXCISION  2019    MYOMECTOMY      OTHER SURGICAL HISTORY      SKIN CANCER REMOVED FROM NOSE FOREHEAD NECK CHEST BACK AND STOACH    SKIN BIOPSY      Basal cell carcinoma removed from face, nose, L and R arm and abdomen    VENTRAL HERNIA REPAIR N/A 2024    OPEN VENTRAL HERNIA REPAIR performed by Fam Phillips MD at [facility] MAIN OR    WISDOM TOOTH EXTRACTION         Current Outpatient Medications   Medication Sig Dispense Refill    Probiotic Product (PROBIOTIC BLEND PO) Take 1 capsule by mouth daily      ferrous sulfate (IRON 325) 325 (65 Fe) MG tablet Take 1 tablet by mouth daily (with breakfast)      vitamin C (ASCORBIC ACID) 500 MG tablet Take 1 tablet by mouth daily      Cholecalciferol (VITAMIN D) 10 MCG (400 UNIT) CAPS Capsule Vitamin D3      Cobalamin Combinations (VITAMIN B12-FOLIC ACID) 500-400 MCG TABS Vitamin B12      pantoprazole (PROTONIX) 40 MG tablet Take 1 tablet by mouth daily      sertraline (ZOLOFT) 50 MG tablet Take 0.5 tablets by mouth daily       No current facility-administered medications for this visit.     Allergies: Amoxicillin and Adhesive tape     Tobacco History:  reports that she has never smoked. She has never used smokeless tobacco.  Alcohol Abuse:  reports that she does not currently use alcohol.  Drug Abuse:  reports no history of drug use.    Family Medical/Cancer History:   Family History   Problem Relation Age of Onset    Breast Cancer Maternal Aunt         postmen    Breast Cancer Paternal

## 2025-01-21 NOTE — PROGRESS NOTES
Shweta Sargent is a 49 y.o. female returns for an annual exam. Doing well, no cycles since ablation 2021    Chief Complaint   Patient presents with    Annual Exam       Pap:2024 normal    Mammogram:2023 normal    Colonoscopy:benign polyps 2022    Cycles:none since ablation    Birth Control:none      1. Have you been to the ER, urgent care clinic, or hospitalized since your last visit? No    2. Have you seen or consulted any other health care providers outside of the [facility] since your last visit? No     Jessica Castellanos LPN.

## 2025-01-26 LAB
., LABCORP: NORMAL
CYTOLOGIST CVX/VAG CYTO: NORMAL
CYTOLOGY CVX/VAG DOC CYTO: NORMAL
CYTOLOGY CVX/VAG DOC THIN PREP: NORMAL
DX ICD CODE: NORMAL
Lab: NORMAL
OTHER STN SPEC: NORMAL
STAT OF ADQ CVX/VAG CYTO-IMP: NORMAL

## (undated) DEVICE — PAD,SANITARY,11 IN,MAXI,N-STRL,IND WRAP: Brand: MEDLINE

## (undated) DEVICE — APPLICATOR MEDICATED 26 CC SOLUTION HI LT ORNG CHLORAPREP

## (undated) DEVICE — (D)PREP SKN CHLRAPRP APPL 26ML -- CONVERT TO ITEM 371833

## (undated) DEVICE — SYR IRR BLB 2OZ DISP BLU STRL -- CONVERT TO ITEM 357637

## (undated) DEVICE — SUT CHRMC 2-0 27IN CT1 BRN --

## (undated) DEVICE — SET SEALS HYSTEROSCOPE DISP -- MYOSURE  EA=10

## (undated) DEVICE — SOLUTION IRRIG 1000ML 0.9% SOD CHL USP POUR PLAS BTL

## (undated) DEVICE — BASIN ST MAJOR-NO CAUTERY: Brand: MEDLINE INDUSTRIES, INC.

## (undated) DEVICE — SUTURE VICRYL SZ 2-0 L27IN ABSRB UD L26MM SH 1/2 CIR J417H

## (undated) DEVICE — LIGHT HANDLE: Brand: DEVON

## (undated) DEVICE — KIT THERMOABLATION 6MM ENDOMET DEV NOVASURE

## (undated) DEVICE — 3M™ IOBAN™ 2 ANTIMICROBIAL INCISE DRAPE 6650EZ: Brand: IOBAN™ 2

## (undated) DEVICE — SUTURE PDS II SZ 0 L36IN ABSRB VLT L40MM CT 1/2 CIR Z358T

## (undated) DEVICE — UMBILICAL CORD CLAMP: Brand: DEROYAL

## (undated) DEVICE — ANESTHESIA TRAY SPNL W/O NDL PENCAN

## (undated) DEVICE — COVER,TABLE,60X90,STERILE: Brand: MEDLINE

## (undated) DEVICE — STERILE POLYISOPRENE POWDER-FREE SURGICAL GLOVES: Brand: PROTEXIS

## (undated) DEVICE — FORCEPS BX L240CM JAW DIA2.8MM L CAP W/ NDL MIC MESH TOOTH

## (undated) DEVICE — PENCIL SMK EVAC 10 FT BLADE ELECTRD ROCKER FOR TELSCP

## (undated) DEVICE — SOLUTION IRRIG 3000ML 0.9% SOD CHL USP UROMATIC PLAS CONT

## (undated) DEVICE — SYRINGE CTRL M LUER LCK RNG AND FNGR RNGS 10ML

## (undated) DEVICE — DRAPE,REIN 53X77,STERILE: Brand: MEDLINE

## (undated) DEVICE — SOLUTION IRRIG 1000ML H2O STRL BLT

## (undated) DEVICE — SYRINGE MED 10ML LUERLOCK TIP W/O SFTY DISP

## (undated) DEVICE — ROYALSILK SURGICAL GOWN, L: Brand: CONVERTORS

## (undated) DEVICE — KENDALL SCD EXPRESS SLEEVES, KNEE LENGTH, MEDIUM: Brand: KENDALL SCD

## (undated) DEVICE — REM POLYHESIVE ADULT PATIENT RETURN ELECTRODE: Brand: VALLEYLAB

## (undated) DEVICE — GLOVE ORANGE PI 7 1/2   MSG9075

## (undated) DEVICE — GARMENT,MEDLINE,DVT,INT,CALF,MED, GEN2: Brand: MEDLINE

## (undated) DEVICE — NEEDLE SPNL 22GA L3.5IN BLK HUB S STL REG WALL FIT STYL W/

## (undated) DEVICE — DERMABOND SKIN ADH 0.7ML -- DERMABOND ADVANCED 12/BX

## (undated) DEVICE — SPONGE GZ W4XL4IN COT RADPQ HIGHLY ABSRB

## (undated) DEVICE — DRAPE FLD WRM W44XL66IN C6L FOR INTRATEMP SYS THERMABASIN

## (undated) DEVICE — HANDLE LT SNAP ON ULT DURABLE LENS FOR TRUMPF ALC DISPOSABLE

## (undated) DEVICE — SHEET,DRAPE,UNDERBUTTOCK,GRAD POUCH,PORT: Brand: MEDLINE

## (undated) DEVICE — PENCIL ES L3M BTTN SWCH S STL HEX LOK BLDE ELECTRD HOLSTER

## (undated) DEVICE — TIP CLEANER: Brand: VALLEYLAB

## (undated) DEVICE — MEDI-VAC NON-CONDUCTIVE SUCTION TUBING: Brand: CARDINAL HEALTH

## (undated) DEVICE — COVERALL PREM SMS 2XL KNIT --

## (undated) DEVICE — SUTURE MCRYL SZ 4-0 L27IN ABSRB UD L24MM PS-1 3/8 CIR PRIM Y935H

## (undated) DEVICE — TUBING HYDR IRR --

## (undated) DEVICE — SURGICAL PROCEDURE PACK TISS 3X5 IN ABSORBABLE SEPRAFILM

## (undated) DEVICE — Z DISCONTINUED USE 2220190 SUTURE VICRYL SZ 3-0 L27IN ABSRB UD L26MM SH 1/2 CIR J416H

## (undated) DEVICE — TOWEL SURG W17XL27IN STD BLU COT NONFENESTRATED PREWASHED

## (undated) DEVICE — PACK PROCEDURE SURG C SECT KT SMH

## (undated) DEVICE — DRAPE,LITHOTOMY,STERILE: Brand: MEDLINE

## (undated) DEVICE — BASIC SINGLE BASIN BTC-LF: Brand: MEDLINE INDUSTRIES, INC.

## (undated) DEVICE — GOWN,SIRUS,NONRNF,SETINSLV,XL,20/CS: Brand: MEDLINE

## (undated) DEVICE — SPONGE: LAP 18X18 W  200/CS: Brand: MEDICAL ACTION INDUSTRIES

## (undated) DEVICE — SOLUTION IV 1000ML 0.9% SOD CHL

## (undated) DEVICE — LIQUIBAND RAPID ADHESIVE 36/CS 0.8ML: Brand: MEDLINE

## (undated) DEVICE — ELECTRODE PT RET AD L9FT HI MOIST COND ADH HYDRGEL CORDED

## (undated) DEVICE — DEVON™ KNEE AND BODY STRAP 60" X 3" (1.5 M X 7.6 CM): Brand: DEVON

## (undated) DEVICE — HYPODERMIC SAFETY NEEDLE: Brand: MONOJECT

## (undated) DEVICE — CATH FOLEY 16F LUBRI-SIL IC --

## (undated) DEVICE — SUTURE MONOCRYL SZ 4-0 L27IN ABSRB UD L19MM PS-2 1/2 CIR PRIM Y426H

## (undated) DEVICE — MARKER,SKIN,WI/RULER AND LABELS: Brand: MEDLINE

## (undated) DEVICE — X-RAY DETECTABLE SPONGES,16 PLY: Brand: VISTEC

## (undated) DEVICE — SUTURE ETHIBOND EXCEL SZ 0 L18IN NONABSORBABLE GRN L36MM CT-1 CX21D

## (undated) DEVICE — 1LYRTR 16FR10ML 100%SILI SNAP: Brand: MEDLINE INDUSTRIES, INC.

## (undated) DEVICE — POOLE SUCTION INSTRUMENT WITH REMOVABLE SHEATH: Brand: POOLE

## (undated) DEVICE — TRAY PREP DRY W/ PREM GLV 2 APPL 6 SPNG 2 UNDPD 1 OVERWRAP

## (undated) DEVICE — SOLIDIFIER MEDC 1200ML -- CONVERT TO 356117

## (undated) DEVICE — FLUID MGMT SYS FLUENT KIT 6/PK

## (undated) DEVICE — DRAIN SURG 19FR 100% SIL RADPQ RND CHN FULL FLUT

## (undated) DEVICE — STERILE POLYISOPRENE POWDER-FREE SURGICAL GLOVES WITH EMOLLIENT COATING: Brand: PROTEXIS

## (undated) DEVICE — SUTURE MCRYL SZ 0 L36IN ABSRB UD L36MM CT-1 1/2 CIR Y946H

## (undated) DEVICE — 3000CC GUARDIAN II: Brand: GUARDIAN

## (undated) DEVICE — PACK,LAPAROTOMY,2 REINFORCED GOWNS: Brand: MEDLINE

## (undated) DEVICE — GLOVE SURG SZ 8 L12IN FNGR THK79MIL GRN LTX FREE